# Patient Record
Sex: FEMALE | Race: WHITE | HISPANIC OR LATINO | Employment: UNEMPLOYED | URBAN - METROPOLITAN AREA
[De-identification: names, ages, dates, MRNs, and addresses within clinical notes are randomized per-mention and may not be internally consistent; named-entity substitution may affect disease eponyms.]

---

## 2017-01-24 ENCOUNTER — POST PARTUM (OUTPATIENT)
Dept: OBGYN | Facility: CLINIC | Age: 22
End: 2017-01-24

## 2017-01-24 VITALS — DIASTOLIC BLOOD PRESSURE: 56 MMHG | BODY MASS INDEX: 24.76 KG/M2 | SYSTOLIC BLOOD PRESSURE: 112 MMHG | WEIGHT: 131 LBS

## 2017-01-24 DIAGNOSIS — B37.89 CANDIDIASIS OF BREAST: ICD-10-CM

## 2017-01-24 PROCEDURE — 90050 PR POSTPARTUM VISIT: CPT | Performed by: NURSE PRACTITIONER

## 2017-01-24 RX ORDER — NYSTATIN 100000 U/G
CREAM TOPICAL
Qty: 1 TUBE | Refills: 0 | Status: SHIPPED | OUTPATIENT
Start: 2017-01-24 | End: 2019-03-08

## 2017-01-24 ASSESSMENT — ENCOUNTER SYMPTOMS
RESPIRATORY NEGATIVE: 1
PSYCHIATRIC NEGATIVE: 1
CARDIOVASCULAR NEGATIVE: 1
NEUROLOGICAL NEGATIVE: 1
MUSCULOSKELETAL NEGATIVE: 1
GASTROINTESTINAL NEGATIVE: 1
CONSTITUTIONAL NEGATIVE: 1
EYES NEGATIVE: 1

## 2017-01-24 NOTE — MR AVS SNAPSHOT
Johanny Dent   2017 10:00 AM   Post Partum   MRN: [id number]    [facility]   Dept Phone:  [phone]    Description:  Female : 1995   Provider:  JUDITH Rocha           Allergies as of 2017     No Known Allergies      You were diagnosed with     Encounter for postpartum care of lactating mother   [501297]  -  Primary     Candidiasis of breast   [[id number]]         Vital Signs     Blood Pressure Weight Last Menstrual Period Smoking Status          112/56 mmHg 59.421 kg (131 lb) 2016 Never Smoker         Basic Information     Date Of Birth Sex Race Ethnicity Preferred Language    1995 Female Unable to Obtain  Origin (Peruvian,Bahamian,German,Emirati, etc) English      Problem List              ICD-10-CM Priority Class Noted - Resolved    Encounter for postpartum care of lactating mother Z39.1   2017 - Present      Health Maintenance        Date Due Completion Dates    IMM HEP B VACCINE (1 of 3 - Primary Series) 1996 ---    IMM HEP A VACCINE (1 of 2 - Standard Series) 1997 ---    IMM HPV VACCINE (1 of 3 - Female 3 Dose Series) 2007 ---    IMM VARICELLA (CHICKENPOX) VACCINE (1 of 2 - 2 Dose Adolescent Series) 2009 ---    IMM MENINGOCOCCAL VACCINE (MCV4) (1 of 1) 2012 ---    IMM DTaP/Tdap/Td Vaccine (2 - Td) 2026            Current Immunizations     Influenza Vaccine Quad Inj (Pf) 2016 11:21 AM    Tdap Vaccine 2016  2:30 PM      Below and/or attached are the medications your provider expects you to take. Review all of your home medications and newly ordered medications with your provider and/or pharmacist. Follow medication instructions as directed by your provider and/or pharmacist. Please keep your medication list with you and share with your provider. Update the information when medications are discontinued, doses are changed, or new medications (including over-the-counter products) are  added; and carry medication information at all times in the event of emergency situations     Allergies:  No Known Allergies          Medications  Valid as of: January 24, 2017 -  1:26 PM    Generic Name Brand Name Tablet Size Instructions for use    Ibuprofen (Tab) MOTRIN 800 MG Take 1 Tab by mouth every 8 hours as needed for Moderate Pain (Cramping).        Nystatin (Cream) MYCOSTATIN 317883 UNIT/GM Apply small amount of cream over the affected nipple twice a day        Oxycodone-Acetaminophen (Tab) PERCOCET 5-325 MG Take 1 Tab by mouth every four hours as needed for Moderate Pain or Severe Pain ((Pain Scale 4-6)).        Prenatal MV-Min-Fe Fum-FA-DHA   Take  by mouth.        .                 Medicines prescribed today were sent to:     [facility] [address] [place] [address] [address] [facility] [address]    Phone: [phone] Fax: [phone]    Open 24 Hours?: No      Medication refill instructions:       If your prescription bottle indicates you have medication refills left, it is not necessary to call your provider’s office. Please contact your pharmacy and they will refill your medication.    If your prescription bottle indicates you do not have any refills left, you may request refills at any time through one of the following ways: The online VersionOne system (except Urgent Care), by calling your provider’s office, or by asking your pharmacy to contact your provider’s office with a refill request. Medication refills are processed only during regular business hours and may not be available until the next business day. Your provider may request additional information or to have a follow-up visit with you prior to refilling your medication.   *Please Note: Medication refills are assigned a new Rx number when refilled electronically. Your pharmacy may indicate that no refills were authorized even though a new prescription for the same medication is available at the pharmacy. Please request the  medicine by name with the pharmacy before contacting your provider for a refill.           Taxizu Access Code: [email or web address]  Expires: 1/31/2017  8:16 AM    Taxizu  A secure, online tool to manage your health information     [employer] Taxizu® is a secure, online tool that connects you to your personalized health information from the privacy of your home -- day or night - making it very easy for you to manage your healthcare. Once the activation process is completed, you can even access your medical information using the Taxizu denisha, which is available for free in the Apple Denisha store or Google Play store.     Taxizu provides the following levels of access (as shown below):   My Chart Features   [facility] Doctor [facility]  Specialists [facility]  Urgent  Care [facility]  Primary Care  Doctor   Email your healthcare team securely and privately 24/7 X X X    Manage appointments: schedule your next appointment; view details of past/upcoming appointments X      Request prescription refills. X      View recent personal medical records, including lab and immunizations X X X X   View health record, including health history, allergies, medications X X X X   Read reports about your outpatient visits, procedures, consult and ER notes X X X X   See your discharge summary, which is a recap of your hospital and/or ER visit that includes your diagnosis, lab results, and care plan. X X       How to register for Taxizu:  1. Go to  [email or web address].  2. Click on the Sign Up Now box, which takes you to the New Member Sign Up page. You will need to provide the following information:  a. Enter your Taxizu Access Code exactly as it appears at the top of this page. (You will not need to use this code after you’ve completed the sign-up process. If you do not sign up before the expiration date, you must request a new code.)   b. Enter your date of birth.   c. Enter your home email address.   d. Click Submit, and  follow the next screen’s instructions.  3. Create a Zulahoot ID. This will be your [facility] [employer] login ID and cannot be changed, so think of one that is secure and easy to remember.  4. Create a Zulahoot password. You can change your password at any time.  5. Enter your Password Reset Question and Answer. This can be used at a later time if you forget your password.   6. Enter your e-mail address. This allows you to receive e-mail notifications when new information is available in [facility].  7. Click Sign Up. You can now view your health information.    For assistance activating your [employer] account, call [phone]

## 2017-01-24 NOTE — PROGRESS NOTES
Subjective:      Johanny Hubbard is a 21 y.o. female who presents with No chief complaint on file.    Johanny Hubbard is a 21 y.o.  female who presents for her postpartum exam. She had a P C/S without complication. Her prenatal course was uncompllicated. She denies dysuria, vaginal bleeding, odor, itching or breast problems. She is breastfeeding. She reports having nipple pain and redness with white spots on breast. She states her baby has white spots in his mouth as well. She desires an nothing for her birth control method. Reports no sex prior to this appointment.  Denies any S/S of PP depression.    Assessment   Assessment:    1. PP care of lactating women   2. Exam WNL   3. Pap not done  4. Desires contraception     Patient Active Problem List    Diagnosis Date Noted   • Encounter for postpartum care of lactating mother 2017       Plan   Plan:    1. Breastfeeding support   2. Continue PNV   3. Contraceptive counseling - follow up w health dept or [facility] for BCM  4. Encouraged condom use   5. Discussed diet, exercise and resumption of sexual activity   6. Rx for nystatin  7.  F/u c PCP or [facility] clinic as needed for primary care needs.             HPI    Review of Systems   Constitutional: Negative.    HENT: Negative.    Eyes: Negative.    Respiratory: Negative.    Cardiovascular: Negative.    Gastrointestinal: Negative.    Genitourinary: Negative.    Musculoskeletal: Negative.    Skin: Negative.    Neurological: Negative.    Endo/Heme/Allergies: Negative.    Psychiatric/Behavioral: Negative.           Objective:     /56 mmHg  Wt 59.421 kg (131 lb)  LMP 2016     Physical Exam   Constitutional: She is oriented to person, place, and time. She appears well-developed and well-nourished.   HENT:   Head: Normocephalic.   Eyes: Pupils are equal, round, and reactive to light.   Neck: Normal range of motion.   Cardiovascular: Normal rate, regular rhythm and normal heart  sounds.    Pulmonary/Chest: Effort normal and breath sounds normal.   Abdominal: Soft.   Genitourinary: Vagina normal and uterus normal. Rectal exam shows no tenderness. Pelvic exam was performed with patient supine. No labial fusion. There is no rash, tenderness, lesion or injury on the right labia. There is no rash, tenderness, lesion or injury on the left labia. No erythema, tenderness or bleeding in the vagina. No signs of injury around the vagina. No vaginal discharge found.   Musculoskeletal: Normal range of motion.   Neurological: She is alert and oriented to person, place, and time.   Skin: Skin is warm and dry.   Psychiatric: She has a normal mood and affect. Her behavior is normal. Judgment and thought content normal.               Assessment/Plan:     1. Encounter for postpartum care of lactating mother      2. Candidiasis of breast    - nystatin (MYCOSTATIN) 415359 UNIT/GM Cream topical cream; Apply small amount of cream over the affected nipple twice a day  Dispense: 1 Tube; Refill: 0

## 2017-01-24 NOTE — NON-PROVIDER
Pt here today for postpartum exam.  Delivery type: C/S on 12-19-16  Currently : Breast and bottle feeding  Desired BCM: None  LMP: N/A  Phone # [phone]  Pt states she had signs and symptoms of PP depression; resolved

## 2018-05-30 ENCOUNTER — HOSPITAL ENCOUNTER (EMERGENCY)
Facility: MEDICAL CENTER | Age: 23
End: 2018-05-30
Attending: EMERGENCY MEDICINE

## 2018-05-30 ENCOUNTER — APPOINTMENT (OUTPATIENT)
Dept: RADIOLOGY | Facility: MEDICAL CENTER | Age: 23
End: 2018-05-30
Attending: EMERGENCY MEDICINE

## 2018-05-30 VITALS
BODY MASS INDEX: 28.59 KG/M2 | TEMPERATURE: 97.9 F | DIASTOLIC BLOOD PRESSURE: 85 MMHG | HEIGHT: 57 IN | OXYGEN SATURATION: 99 % | SYSTOLIC BLOOD PRESSURE: 121 MMHG | RESPIRATION RATE: 16 BRPM | HEART RATE: 65 BPM | WEIGHT: 132.5 LBS

## 2018-05-30 DIAGNOSIS — S60.012A CONTUSION OF LEFT THUMB WITHOUT DAMAGE TO NAIL, INITIAL ENCOUNTER: Primary | ICD-10-CM

## 2018-05-30 PROCEDURE — 99284 EMERGENCY DEPT VISIT MOD MDM: CPT

## 2018-05-30 PROCEDURE — 90471 IMMUNIZATION ADMIN: CPT

## 2018-05-30 PROCEDURE — 700111 HCHG RX REV CODE 636 W/ 250 OVERRIDE (IP): Performed by: EMERGENCY MEDICINE

## 2018-05-30 PROCEDURE — 700102 HCHG RX REV CODE 250 W/ 637 OVERRIDE(OP): Performed by: EMERGENCY MEDICINE

## 2018-05-30 PROCEDURE — 302874 HCHG BANDAGE ACE 2 OR 3""

## 2018-05-30 PROCEDURE — 73130 X-RAY EXAM OF HAND: CPT | Mod: LT

## 2018-05-30 PROCEDURE — A9270 NON-COVERED ITEM OR SERVICE: HCPCS | Performed by: EMERGENCY MEDICINE

## 2018-05-30 PROCEDURE — 29125 APPL SHORT ARM SPLINT STATIC: CPT

## 2018-05-30 PROCEDURE — 90715 TDAP VACCINE 7 YRS/> IM: CPT | Performed by: EMERGENCY MEDICINE

## 2018-05-30 RX ORDER — IBUPROFEN 600 MG/1
600 TABLET ORAL EVERY 6 HOURS PRN
Status: DISCONTINUED | OUTPATIENT
Start: 2018-05-30 | End: 2018-05-30 | Stop reason: HOSPADM

## 2018-05-30 RX ORDER — IBUPROFEN 600 MG/1
600 TABLET ORAL EVERY 6 HOURS PRN
Qty: 20 TAB | Refills: 0 | Status: SHIPPED | OUTPATIENT
Start: 2018-05-30 | End: 2019-03-08

## 2018-05-30 RX ORDER — HYDROCODONE BITARTRATE AND ACETAMINOPHEN 5; 325 MG/1; MG/1
1-2 TABLET ORAL EVERY 6 HOURS PRN
Status: DISCONTINUED | OUTPATIENT
Start: 2018-05-30 | End: 2018-05-30 | Stop reason: HOSPADM

## 2018-05-30 RX ORDER — HYDROCODONE BITARTRATE AND ACETAMINOPHEN 5; 325 MG/1; MG/1
1-2 TABLET ORAL EVERY 4 HOURS PRN
Qty: 10 TAB | Refills: 0 | Status: SHIPPED | OUTPATIENT
Start: 2018-05-30 | End: 2018-06-02

## 2018-05-30 RX ADMIN — HYDROCODONE BITARTRATE AND ACETAMINOPHEN 1 TABLET: 5; 325 TABLET ORAL at 05:08

## 2018-05-30 RX ADMIN — IBUPROFEN 600 MG: 600 TABLET, FILM COATED ORAL at 05:08

## 2018-05-30 RX ADMIN — CLOSTRIDIUM TETANI TOXOID ANTIGEN (FORMALDEHYDE INACTIVATED), CORYNEBACTERIUM DIPHTHERIAE TOXOID ANTIGEN (FORMALDEHYDE INACTIVATED), BORDETELLA PERTUSSIS TOXOID ANTIGEN (GLUTARALDEHYDE INACTIVATED), BORDETELLA PERTUSSIS FILAMENTOUS HEMAGGLUTININ ANTIGEN (FORMALDEHYDE INACTIVATED), BORDETELLA PERTUSSIS PERTACTIN ANTIGEN, AND BORDETELLA PERTUSSIS FIMBRIAE 2/3 ANTIGEN 0.5 ML: 5; 2; 2.5; 5; 3; 5 INJECTION, SUSPENSION INTRAMUSCULAR at 05:09

## 2018-05-30 ASSESSMENT — PAIN SCALES - GENERAL
PAINLEVEL_OUTOF10: 0
PAINLEVEL_OUTOF10: 8

## 2018-05-30 NOTE — DISCHARGE INSTRUCTIONS
Follow-up with orthopedics next week for reevaluation.    Keep splint clean, dry and intact.  Nonweightbearing left hand.    Motrin every 6 hours as needed for discomfort.  Norco every 4-6 hours as needed for breakthrough pain.    Rest, ice, elevate as needed for swelling or discomfort.    Return the emergency department for increased pain, swelling, discoloration, paresthesias or other new concerns.    Crush Injury of the Hand  Introduction  When a crush injury of the hand occurs, many structures within the hand and wrist joint can be affected. This can result in a complicated injury that may involve:  · One or more broken (fractured) bones.  · Lacerations or abrasions of the skin. These increase your risk of infection.  · Compressed or torn muscles.  · Torn ligaments and tendons.  · Broken blood vessels, causing bleeding within the tissues. This can lead to dangerously high pressure within the tissues (compartment syndrome).  · Damage to nerves.  · One or more finger amputations.  What are the causes?  This type of injury can happen when a great amount of force is suddenly applied to the hand. This might occur:  · During a motor vehicle accident.  · If the hand is pulled into a machine during industrial or agricultural work.  · If a heavy load falls directly onto the hand.  What are the signs or symptoms?  Symptoms will vary depending on which structures in your hand have been injured. Symptoms may include:  · Moderate or severe pain in the hand, wrist, or arm.  · Bleeding at the site of injury.  · Tingling, numbness, or loss of feeling (sensation) in part or all of your hand.  · Loss of movement in part or all of your hand.  How is this diagnosed?  Your health care provider will examine you and ask questions about how your injury happened. The exam may include checking for sensation and blood flow into your hand. You may also have tests, including X-rays and procedures to check the pressure in your hand.  After  initial treatment, additional studies may be done to further diagnose the extent of your injuries. These may include:  · A nerve conduction study to determine how well the nerves are working in your arm and hand.  · MRI to determine if other injuries occurred that do not usually show up on X-ray.  How is this treated?  Treatment for this condition depends on the severity of your crush injury. Treatment may include:  · Thorough cleaning if you have an open wound. This may or may not require surgery.  · Having a splint applied to your fingers, hand, or forearm.  · Medicine to relieve pain.  · Antibiotic medicine to prevent infection.  · Stitches (sutures) to close open wounds.  · One or more surgeries to address injuries to skin, bones, joints, tendons, ligaments, muscles, nerves, or blood vessels.  Follow these instructions at home:  If you have a splint:  · Wear the splint as told by your health care provider. Remove it only as told by your health care provider.  · Loosen the splint if your fingers tingle, become numb, or turn cold and blue.  · Do not let your splint get wet if it is not waterproof.  · Keep the splint clean.  Wound care  · If you have any skin wounds that were covered with bandages (dressings), follow instructions from your health care provider about how to take care of your wound. Make sure you:  ¨ Wash your hands with soap and water before you change your dressing. If soap and water are not available, use hand .  ¨ Change your dressing as told by your health care provider.  ¨ Leave stitches (sutures), skin glue, or adhesive strips in place. These skin closures may need to stay in place for 2 weeks or longer. If adhesive strip edges start to loosen and curl up, you may trim the loose edges. Do not remove adhesive strips completely unless your health care provider tells you to do that.  · If you have skin wounds, check them every day for signs of infection. Check for:  ¨ More redness,  swelling, or pain.  ¨ More fluid or blood.  ¨ Warmth.  ¨ Pus or a bad smell.  Managing pain, stiffness, and swelling  · If directed, put ice on the injured area.  ¨ Put ice in a plastic bag.  ¨ Place a towel between your skin and the bag.  ¨ Leave the ice on for 20 minutes, 2-3 times a day.  · Raise (elevate) the injured area above the level of your heart while you are sitting or lying down.  Driving  · Do not drive or operate heavy machinery while taking prescription pain medicine.  · Ask your health care provider when it is safe to drive if you have a splint on your hand or arm.  Activity  · Return to your normal activities as told by your health care provider. Ask your health care provider what activities are safe for you.  · Work with a physical therapist (PT) or occupational therapist (OT) as told by your health care provider.  General instructions  · Do not put pressure on any part of the splint until it is fully hardened. This may take several hours.  · If you have a splint and it is not waterproof, cover it with a watertight plastic bag when you take a bath or a shower.  · Take over-the-counter and prescription medicines only as told by your health care provider.  · If you were prescribed an antibiotic, take it as told by your health care provider. Do not stop taking the antibiotic before the prescription is done.  · Do not use any tobacco products, such as cigarettes, chewing tobacco, and e-cigarettes. Tobacco can delay bone healing. If you need help quitting, ask your health care provider.  · Keep all follow-up visits as told by your health care provider. This is important. These include PT and OT visits.  Contact a health care provider if:  · A wound that was sutured opens up.  · You have more redness, swelling, or pain in your hand.  · You have more fluid or blood coming from your hand.  · Your hand feels warm to the touch.  · You have pus or a bad smell coming from your hand.  · You have a fever.  Get  help right away if:  · You suddenly develop severe pain in your hand.  · You previously had sensation in your hand and you suddenly lose sensation.  · Your wrist or hand becomes bent (contracted) involuntarily.  · Your symptoms had improved and they suddenly get worse.  · Your hand or fingers are turning pink or blue.  This information is not intended to replace advice given to you by your health care provider. Make sure you discuss any questions you have with your health care provider.  Document Released: 11/28/2016 Document Revised: 05/25/2017 Document Reviewed: 08/10/2016  © 2017 Elsevier

## 2018-05-30 NOTE — ED NOTES
D/c instructions and rxs reviewed with pt.pt instructed do not drink etoh, drive or work while taking norco, pt verbalized understanding. Pt in nad at time of d/c. Splint in place at time of d/c.

## 2018-05-30 NOTE — ED TRIAGE NOTES
"Triage notes    Pt c/o LEFT thumb pain from hitting herself with a hammer at 1900, small cut to top of thumb, bleeding controlled with mild swelling noted.    xrays ordered    .  Chief Complaint   Patient presents with   • Digit Pain     hit LEFT thumb with a hammer at 1900, small cut to finger, bleeding controlled      ./71   Pulse (!) 59   Temp 36.1 °C (97 °F)   Resp 18   Ht 1.448 m (4' 9\")   Wt 60.1 kg (132 lb 7.9 oz)   LMP 03/06/2016 Comment: Regular periods, +UPT April, no bleeding since  SpO2 98%   BMI 28.67 kg/m²     "

## 2018-05-30 NOTE — ED PROVIDER NOTES
"ED Provider Note    CHIEF COMPLAINT  Chief Complaint   Patient presents with   • Digit Pain     hit LEFT thumb with a hammer at 1900, small cut to finger, bleeding controlled        HPI  Johanny Hubbard is a 22 y.o. female who presents to the emergency department with left thumb pain.  Patient states she accidentally hammered her own some last night, left thumb over the interphalangeal joint.  Mild swelling, stiffness and throbbing since that time.  Throbbing, 6 out of 10, keeping her awake overnight.  Abrasion overlying this area as well.  Last tetanus greater than 5 years ago.  No medications taken for discomfort at home.  No paresthesias or discoloration.    REVIEW OF SYSTEMS  See HPI for further details.     PAST MEDICAL HISTORY   Denies    SOCIAL HISTORY  Social History     Social History Main Topics   • Smoking status: Never Smoker   • Smokeless tobacco: Never Used   • Alcohol use No   • Drug use: No   • Sexual activity: Yes     Partners: Male      Comment: none        SURGICAL HISTORY   has a past surgical history that includes primary c section (12/19/2016).    CURRENT MEDICATIONS  Home Medications    **Home medications have not yet been reviewed for this encounter**     Denies    ALLERGIES  No Known Allergies    PHYSICAL EXAM  VITAL SIGNS: /71   Pulse (!) 59   Temp 36.1 °C (97 °F)   Resp 18   Ht 1.448 m (4' 9\")   Wt 60.1 kg (132 lb 7.9 oz)   LMP 03/06/2016 Comment: Regular periods, +UPT April, no bleeding since  SpO2 98%   BMI 28.67 kg/m²   Pulse ox interpretation: I interpret this pulse ox as normal.  Constitutional: Alert in no apparent distress.  HENT: Normocephalic, atraumatic. Bilateral external ears normal, Nose normal. Moist mucous membranes.    Eyes:  Conjunctiva normal.   Neck: Normal range of motion, Supple  Cardiovascular: Normal peripheral perfusion.  Thorax & Lungs: Nonlabored respirations.  Skin: Warm, Dry  Musculoskeletal: Left thumb with mild swelling circumferentially " compared to right.  Pain with palpation and any active or passive range of motion at the interphalangeal joint.  Superficial punctate abrasion laterally without hematoma, active bleeding or laceration.  No evidence for full-thickness injury.  Less than 2 second capillary refill distally.  Sensation intact light touch distally.  Neurologic: Alert , no gross focal deficit noted.  Psychiatric: Affect normal, Judgment normal, Mood normal.       DIAGNOSTIC STUDIES / PROCEDURES  RADIOLOGY  DX-HAND 3+ LEFT   Final Result         1.  No acute traumatic bony injury.          COURSE & MEDICAL DECISION MAKING  Evaluation most consistent with left thumb contusion/crush injury, however my personal review of the images, one view is concerning for a nondisplaced fracture through the proximal distal phalanges of the left thumb.  Otherwise no acute traumatic bony injury noted by radiologist.  Superficial abrasion without full-thickness injury or evidence for open fracture.  CMS is intact distally.  Wound has been cleansed and dressed.  Tetanus has been updated.  Motrin and Norco for discomfort.  Patient has been placed in a thumb spica and will follow up with orthopedics next week.    Patient is stable for discharge at this time, anticipatory guidance provided, Motrin, Norco for breakthrough pain, splint for comfort and mobility until seen by orthopedics to exclude occult fracture, close follow-up is encouraged, and strict ED return instructions have been detailed. Patient and her friend are agreeable to the disposition and plan.    FINAL IMPRESSION  (S60.012A) Contusion of left thumb without damage to nail, initial encounter  (primary encounter diagnosis)      Electronically signed by: Vivian Bellamy, 5/30/2018 5:03 AM      This dictation was created using voice recognition software. The accuracy of the dictation is limited to the abilities of the software. I expect there may be some errors of grammar and possibly content. The  nursing notes were reviewed and certain aspects of this information were incorporated into this note.

## 2019-03-08 ENCOUNTER — APPOINTMENT (OUTPATIENT)
Dept: RADIOLOGY | Facility: MEDICAL CENTER | Age: 24
End: 2019-03-08
Attending: EMERGENCY MEDICINE
Payer: OTHER MISCELLANEOUS

## 2019-03-08 ENCOUNTER — HOSPITAL ENCOUNTER (EMERGENCY)
Facility: MEDICAL CENTER | Age: 24
End: 2019-03-08
Attending: EMERGENCY MEDICINE
Payer: OTHER MISCELLANEOUS

## 2019-03-08 VITALS
DIASTOLIC BLOOD PRESSURE: 62 MMHG | HEART RATE: 72 BPM | OXYGEN SATURATION: 99 % | WEIGHT: 140 LBS | TEMPERATURE: 98.2 F | BODY MASS INDEX: 30.3 KG/M2 | RESPIRATION RATE: 16 BRPM | SYSTOLIC BLOOD PRESSURE: 119 MMHG

## 2019-03-08 DIAGNOSIS — M25.552 LEFT HIP PAIN: ICD-10-CM
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
## 2019-03-08 LAB
APPEARANCE UR: CLEAR
BILIRUB UR QL STRIP.AUTO: NEGATIVE
COLOR UR: YELLOW
GLUCOSE UR STRIP.AUTO-MCNC: NEGATIVE MG/DL
HCG UR QL: NEGATIVE
KETONES UR STRIP.AUTO-MCNC: NEGATIVE MG/DL
LEUKOCYTE ESTERASE UR QL STRIP.AUTO: NEGATIVE
MICRO URNS: NORMAL
NITRITE UR QL STRIP.AUTO: NEGATIVE
PH UR STRIP.AUTO: 5.5 [PH]
PROT UR QL STRIP: NEGATIVE MG/DL
RBC UR QL AUTO: NEGATIVE
SP GR UR REFRACTOMETRY: 1.02
SP GR UR STRIP.AUTO: 1.02
UROBILINOGEN UR STRIP.AUTO-MCNC: 0.2 MG/DL

## 2019-03-08 PROCEDURE — 700102 HCHG RX REV CODE 250 W/ 637 OVERRIDE(OP): Performed by: EMERGENCY MEDICINE

## 2019-03-08 PROCEDURE — 99284 EMERGENCY DEPT VISIT MOD MDM: CPT

## 2019-03-08 PROCEDURE — A9270 NON-COVERED ITEM OR SERVICE: HCPCS | Performed by: EMERGENCY MEDICINE

## 2019-03-08 PROCEDURE — 73502 X-RAY EXAM HIP UNI 2-3 VIEWS: CPT | Mod: LT

## 2019-03-08 PROCEDURE — 81025 URINE PREGNANCY TEST: CPT

## 2019-03-08 PROCEDURE — 81003 URINALYSIS AUTO W/O SCOPE: CPT

## 2019-03-08 RX ORDER — ACETAMINOPHEN 325 MG/1
975 TABLET ORAL ONCE
Status: COMPLETED | OUTPATIENT
Start: 2019-03-08 | End: 2019-03-08

## 2019-03-08 RX ORDER — IBUPROFEN 600 MG/1
600 TABLET ORAL ONCE
Status: COMPLETED | OUTPATIENT
Start: 2019-03-08 | End: 2019-03-08

## 2019-03-08 RX ADMIN — ACETAMINOPHEN 975 MG: 325 TABLET, FILM COATED ORAL at 21:45

## 2019-03-08 RX ADMIN — IBUPROFEN 600 MG: 600 TABLET ORAL at 21:45

## 2019-03-08 ASSESSMENT — LIFESTYLE VARIABLES: DO YOU DRINK ALCOHOL: NO

## 2019-03-09 NOTE — DISCHARGE INSTRUCTIONS
You were seen in the emergency department after a fall with left flank and hip pain.  Your physical exam and x-rays were reassuring.  There is no evidence of fracture.  You most likely have bruising.  There is no evidence of blood in the urine.    For pain you can take ibuprofen (Motrin), 600mg every 6 hours as needed for pain (take with food to avoid GI upset). You can also take acetaminophen (Tylenol), 1000mg every 8 hours as needed for pain. Do not take more than 3000mg of acetaminophen in any 24 hour period.  Taking these medications regularly during the day can be very effective in controlling pain.    You will likely be increasingly sore tomorrow, then should begin to improve.     You are found to have a mildly unusual lesion on your bone.  This may be normal, however you should follow-up with your regular doctor.    Please return to the emergency department or seek medical attention if you develop:  Loss of sensation in any part of your body, inability to move your leg or toes, severe abdominal pain, lightheadedness, any other new or concerning findings

## 2019-03-09 NOTE — ED PROVIDER NOTES
ED Provider Note        History obtained from: Patient    CHIEF COMPLAINT  Chief Complaint   Patient presents with   • T-5000 FALL     slipped and fell, landing on left arm and hip. denies LOC or hitting head   • Hip Pain     left        HPI  Johanny Hubbard is a 23 y.o. female who presents with left hip and flank pain.  The patient was at a mall when she slipped on someone else's ice cream, landing on her left side.  She denies any head strike or loss of conscious.  She has been able to ambulate, however with pain.  She reports pain in her left lower back, left hip, left thigh.  Denies any paresthesias.  Denies any other prior history or previous injuries to the site    REVIEW OF SYSTEMS    CONSTITUTIONAL:  No fever.  CARDIOVASCULAR:  No chest discomfort.  RESPIRATORY:  No pleuritic chest pain.  GASTROINTESTINAL:  No abdominal pain.    See HPI for further details.       PAST MEDICAL HISTORY  History reviewed. No pertinent past medical history.    FAMILY HISTORY  History reviewed. No pertinent family history.    SOCIAL HISTORY   reports that she has never smoked. She has never used smokeless tobacco. She reports that she does not drink alcohol or use drugs.    SURGICAL HISTORY  Past Surgical History:   Procedure Laterality Date   • PRIMARY C SECTION  12/19/2016    Procedure: PRIMARY C SECTION;  Surgeon: Lucian Lucero M.D.;  Location: LABOR AND DELIVERY;  Service:        CURRENT MEDICATIONS  Home Medications     Reviewed by Jeremiah Han R.N. (Registered Nurse) on 03/08/19 at 4732  Med List Status: Complete   Medication Last Dose Status   NON SPECIFIED  Active                ALLERGIES  No Known Allergies    PHYSICAL EXAM  VITAL SIGNS: /80   Pulse 83   Temp 36.8 °C (98.2 °F) (Temporal)   Resp 17   Wt 63.5 kg (140 lb)   SpO2 97%   BMI 30.30 kg/m²    Gen: alert, no acute distress  HENT: ATNC  Neck: No tenderness  Eyes: normal conjuctiva  Resp: No resipiratory distress.   CV: No JVD   Abd:  Non-distended, no tenderness  Extremities: No deformity.  Left paraspinous muscle tenderness, left hip tenderness, left thigh tenderness, no deformities.          RADIOLOGY/PROCEDURES  DX-HIP-COMPLETE - UNILATERAL 2+ LEFT   Final Result         1.  No radiographic evidence of acute traumatic injury.   2.  Sclerotic focus in the left femoral metaphysis, appearance suggests bony island, consider other sclerotic bony lesion with additional workup as clinically appropriate            LABS  Labs Reviewed   URINALYSIS,CULTURE IF INDICATED   HCG QUALITATIVE UR   REFRACTOMETER SG        COURSE & MEDICAL DECISION MAKING  Pertinent Labs & Imaging studies reviewed. (See chart for details)    Patient presents with mechanical slip and fall.  Low suspicion for syncope, head injury, cervical spine injury.  Based on the location of the pain, will obtain urinalysis to screen for hematuria.  Will obtain x-ray of the hip.  Low suspicion for unstable spinal injury.  Distal CSMs are intact.    11:04 PM  Urinalysis negative, no evidence of kidney injury, x-ray is reassuring.  Patient does have a sclerotic lesion.  She was notified of this and advised to follow-up with her regular doctor.  Patient has been able to ambulate, however prefers crutches to reduce discomfort.      FINAL IMPRESSION  1. Fall, initial encounter    2. Left hip pain

## 2019-03-09 NOTE — ED NOTES
Pt placed in gown.   Pt medicated (see MAR).   Pt wheeled to bathroom, assisted by RN, urine collected using clean catch and tubed to lab.   Pt back to room, provided with blankets.

## 2019-03-09 NOTE — ED TRIAGE NOTES
Johanny Hubbard  Chief Complaint   Patient presents with   • T-5000 FALL     slipped and fell, landing on left arm and hip. denies LOC or hitting head   • Hip Pain     left      Pt to triage in w/c with above complaint. VSS.  States increased pain to (L) hip with movement and ambulation.  CMS intact.   Pt returned to lobby, educated on triage process, and to inform staff of any changes or concerns.

## 2019-03-09 NOTE — ED NOTES
Pt wheeled to room with assistance from RN. Pain 8/10 in left hip and upper leg. Agree with triage note. Assessment complete. Chart ready for ERP.

## 2019-06-21 ENCOUNTER — HOSPITAL ENCOUNTER (EMERGENCY)
Facility: MEDICAL CENTER | Age: 24
End: 2019-06-21
Attending: EMERGENCY MEDICINE
Payer: COMMERCIAL

## 2019-06-21 ENCOUNTER — NON-PROVIDER VISIT (OUTPATIENT)
Dept: OCCUPATIONAL MEDICINE | Facility: CLINIC | Age: 24
End: 2019-06-21
Payer: COMMERCIAL

## 2019-06-21 VITALS
RESPIRATION RATE: 18 BRPM | OXYGEN SATURATION: 100 % | SYSTOLIC BLOOD PRESSURE: 118 MMHG | WEIGHT: 134.48 LBS | DIASTOLIC BLOOD PRESSURE: 77 MMHG | BODY MASS INDEX: 29.01 KG/M2 | HEART RATE: 65 BPM | HEIGHT: 57 IN | TEMPERATURE: 97.5 F

## 2019-06-21 DIAGNOSIS — Z02.1 PRE-EMPLOYMENT DRUG SCREENING: ICD-10-CM

## 2019-06-21 DIAGNOSIS — Z02.83 ENCOUNTER FOR DRUG SCREENING: ICD-10-CM
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
## 2019-06-21 PROCEDURE — A9270 NON-COVERED ITEM OR SERVICE: HCPCS | Performed by: EMERGENCY MEDICINE

## 2019-06-21 PROCEDURE — 80305 DRUG TEST PRSMV DIR OPT OBS: CPT | Performed by: PREVENTIVE MEDICINE

## 2019-06-21 PROCEDURE — 8895 PB URINE 6 PANEL AFTER HOURS: Performed by: PREVENTIVE MEDICINE

## 2019-06-21 PROCEDURE — 99283 EMERGENCY DEPT VISIT LOW MDM: CPT

## 2019-06-21 PROCEDURE — 700102 HCHG RX REV CODE 250 W/ 637 OVERRIDE(OP): Performed by: EMERGENCY MEDICINE

## 2019-06-21 RX ORDER — CEPHALEXIN 500 MG/1
500 CAPSULE ORAL 4 TIMES DAILY
Qty: 28 QUANTITY SUFFICIENT | Refills: 0 | Status: SHIPPED | OUTPATIENT
Start: 2019-06-21 | End: 2019-06-28

## 2019-06-21 RX ORDER — ACETAMINOPHEN 325 MG/1
650 TABLET ORAL ONCE
Status: COMPLETED | OUTPATIENT
Start: 2019-06-21 | End: 2019-06-21

## 2019-06-21 RX ORDER — DIPHENHYDRAMINE HCL 25 MG
25 TABLET ORAL ONCE
Status: COMPLETED | OUTPATIENT
Start: 2019-06-21 | End: 2019-06-21

## 2019-06-21 RX ADMIN — ACETAMINOPHEN 650 MG: 325 TABLET, FILM COATED ORAL at 15:26

## 2019-06-21 RX ADMIN — DIPHENHYDRAMINE HCL 25 MG: 25 TABLET ORAL at 15:26

## 2019-06-21 NOTE — ED NOTES
"Complains of a possible insect bite on the lateral palmar aspect of her left hand since earlier today.   Chief Complaint   Patient presents with   • Bug Bite     /72   Pulse 62   Temp 36.4 °C (97.5 °F) (Temporal)   Resp 20   Ht 1.448 m (4' 9\")   Wt 61 kg (134 lb 7.7 oz)   LMP 06/07/2019 (Approximate)   SpO2 100%   BMI 29.10 kg/m²     "

## 2019-06-21 NOTE — ED PROVIDER NOTES
"ED Provider Note  CHIEF COMPLAINT  Chief Complaint   Patient presents with   • Bug Bite       HPI  Johanny Hubbard is a 23 y.o. female who presents with an insect bite to the left fifth digit.  It occurred about 6:00 this morning while the patient was at work.  Patient was certain that it was a brown and white spider that she saw bite her.  She has itching and swelling in that area.  She denies any lip swelling.  She denies any rash to the rest of her body.  She denies any difficulty breathing.  She is not having any problems moving her hand.  She states it stings a little and she thought she saw little bit of white stuff come out of the center of the bug bite.  She currently has a mild headache and feels a little bit dizzy.  She denies any history of diabetes.    REVIEW OF SYSTEMS  Positive for insect bite, dizziness and headache negative for lip swelling, difficulty breathing, pain with movement of her hand, numbness or tingling.  Denies fevers per    PAST MEDICAL HISTORY   No diabetes    SOCIAL HISTORY  Social History     Social History Main Topics   • Smoking status: Never Smoker   • Smokeless tobacco: Never Used   • Alcohol use No   • Drug use: No   • Sexual activity: Yes     Partners: Male      Comment: none        SURGICAL HISTORY   has a past surgical history that includes primary c section (12/19/2016).    CURRENT MEDICATIONS  Reviewed.  See Encounter Summary.      ALLERGIES  No Known Allergies    PHYSICAL EXAM  VITAL SIGNS: /72   Pulse 62   Temp 36.4 °C (97.5 °F) (Temporal)   Resp 20   Ht 1.448 m (4' 9\")   Wt 61 kg (134 lb 7.7 oz)   LMP 06/07/2019 (Approximate)   SpO2 100%   BMI 29.10 kg/m²   Constitutional: Alert in no apparent distress.  HENT: Normocephalic, Bilateral external ears normal. Nose normal.  No lip swelling  Eyes: Pupils are equal and reactive. Conjunctiva normal, non-icteric.   Thorax & Lungs: Easy unlabored respirations  Abdomen:  No gross signs of peritonitis, no pain " with movement   Skin: Visualized skin is  Dry, No erythema, No rash.  On the lateral aspect of the fifth digit about the level of the MCP there is a insect bite.  There is no surrounding erythema.  There is no fluctuance or discharge from the insect bite.  Extremities:   No edema, No asymmetry, normal range of motion, no swelling extending onto the hand  Neurologic: Alert, Grossly non-focal.   Psychiatric: Affect and Mood normal      COURSE & MEDICAL DECISION MAKING  Nursing notes and vital signs were reviewed. (See chart for details)    The patient presents to the Emergency Department with an insect bite.  I see no evidence of an expanding cellulitis.  There is no fluctuance to suggest abscess.  Patient has normal range of motion of her hand.  She describes the spider as white-brown.  Does not sound like a black .  She is complaining of a mild headache and some dizziness.  I do not see evidence of further skin rash on the rest of her body or swelling.  I will however give her a Benadryl.  Will give Tylenol for the headache.  She was placed on Keflex as a precaution.  Wound care instructions were given.      Discharge Medications: keflex     The patient verbally agreed to the discharge precautions and follow-up plan which is documented in EPIC.    FINAL IMPRESSION  1. Insect bite, initial encounter

## 2019-06-21 NOTE — DISCHARGE INSTRUCTIONS
You can try some hydrocortisone cream to the bite as well.  Take Benadryl for the itchiness.  Take the antibiotic for possible early infection.  Any hand swelling, fevers, new or different symptoms or concerns return.

## 2019-06-28 LAB
AMP AMPHETAMINE: NORMAL
COC COCAINE: NORMAL
INT CON NEG: NORMAL
INT CON POS: NORMAL
MET METHAMPHETAMINES: NORMAL
OPI OPIATES: NORMAL
PCP PHENCYCLIDINE: NORMAL
POC DRUG COMMENT 753798-OCCUPATIONAL HEALTH: NEGATIVE
THC: NORMAL

## 2020-01-20 ENCOUNTER — HOSPITAL ENCOUNTER (EMERGENCY)
Dept: HOSPITAL 8 - ED | Age: 25
Discharge: HOME | End: 2020-01-20
Payer: SELF-PAY

## 2020-01-20 VITALS — SYSTOLIC BLOOD PRESSURE: 116 MMHG | DIASTOLIC BLOOD PRESSURE: 59 MMHG

## 2020-01-20 VITALS — HEIGHT: 57 IN | BODY MASS INDEX: 28.44 KG/M2 | WEIGHT: 131.84 LBS

## 2020-01-20 DIAGNOSIS — O20.0: Primary | ICD-10-CM

## 2020-01-20 LAB
ALBUMIN SERPL-MCNC: 3.6 G/DL (ref 3.4–5)
ALP SERPL-CCNC: 67 U/L (ref 45–117)
ALT SERPL-CCNC: 11 U/L (ref 12–78)
ANION GAP SERPL CALC-SCNC: 9 MMOL/L (ref 5–15)
BASOPHILS # BLD AUTO: 0.03 X10^3/UL (ref 0–0.1)
BASOPHILS NFR BLD AUTO: 1 % (ref 0–1)
BILIRUB SERPL-MCNC: 0.4 MG/DL (ref 0.2–1)
CALCIUM SERPL-MCNC: 8.2 MG/DL (ref 8.5–10.1)
CHLORIDE SERPL-SCNC: 108 MMOL/L (ref 98–107)
CREAT SERPL-MCNC: 0.69 MG/DL (ref 0.55–1.02)
CULTURE INDICATED?: NO
EOSINOPHIL # BLD AUTO: 0.07 X10^3/UL (ref 0–0.4)
EOSINOPHIL NFR BLD AUTO: 1 % (ref 1–7)
ERYTHROCYTE [DISTWIDTH] IN BLOOD BY AUTOMATED COUNT: 13.3 % (ref 9.6–15.2)
LYMPHOCYTES # BLD AUTO: 2.19 X10^3/UL (ref 1–3.4)
LYMPHOCYTES NFR BLD AUTO: 31 % (ref 22–44)
MCH RBC QN AUTO: 29.3 PG (ref 27–34.8)
MCHC RBC AUTO-ENTMCNC: 33.5 G/DL (ref 32.4–35.8)
MCV RBC AUTO: 87.2 FL (ref 80–100)
MD: NO
MICROSCOPIC: (no result)
MONOCYTES # BLD AUTO: 0.4 X10^3/UL (ref 0.2–0.8)
MONOCYTES NFR BLD AUTO: 6 % (ref 2–9)
NEUTROPHILS # BLD AUTO: 4.41 X10^3/UL (ref 1.8–6.8)
NEUTROPHILS NFR BLD AUTO: 62 % (ref 42–75)
PLATELET # BLD AUTO: 333 X10^3/UL (ref 130–400)
PMV BLD AUTO: 9.1 FL (ref 7.4–10.4)
PROT SERPL-MCNC: 7.5 G/DL (ref 6.4–8.2)
RBC # BLD AUTO: 4.65 X10^6/UL (ref 3.82–5.3)

## 2020-01-20 PROCEDURE — 81003 URINALYSIS AUTO W/O SCOPE: CPT

## 2020-01-20 PROCEDURE — 80053 COMPREHEN METABOLIC PANEL: CPT

## 2020-01-20 PROCEDURE — 76801 OB US < 14 WKS SINGLE FETUS: CPT

## 2020-01-20 PROCEDURE — 36415 COLL VENOUS BLD VENIPUNCTURE: CPT

## 2020-01-20 PROCEDURE — 84702 CHORIONIC GONADOTROPIN TEST: CPT

## 2020-01-20 PROCEDURE — 86901 BLOOD TYPING SEROLOGIC RH(D): CPT

## 2020-01-20 PROCEDURE — 99284 EMERGENCY DEPT VISIT MOD MDM: CPT

## 2020-01-20 PROCEDURE — 85025 COMPLETE CBC W/AUTO DIFF WBC: CPT
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
## 2020-01-22 ENCOUNTER — HOSPITAL ENCOUNTER (EMERGENCY)
Dept: HOSPITAL 8 - ED | Age: 25
Discharge: HOME | End: 2020-01-22
Payer: SELF-PAY

## 2020-01-22 VITALS — DIASTOLIC BLOOD PRESSURE: 72 MMHG | SYSTOLIC BLOOD PRESSURE: 122 MMHG

## 2020-01-22 VITALS — WEIGHT: 129.19 LBS | BODY MASS INDEX: 27.87 KG/M2 | HEIGHT: 57 IN

## 2020-01-22 DIAGNOSIS — O03.4: Primary | ICD-10-CM

## 2020-01-22 DIAGNOSIS — Z3A.08: ICD-10-CM

## 2020-01-22 LAB
BASOPHILS # BLD AUTO: 0.05 X10^3/UL (ref 0–0.1)
BASOPHILS NFR BLD AUTO: 1 % (ref 0–1)
EOSINOPHIL # BLD AUTO: 0.07 X10^3/UL (ref 0–0.4)
EOSINOPHIL NFR BLD AUTO: 1 % (ref 1–7)
ERYTHROCYTE [DISTWIDTH] IN BLOOD BY AUTOMATED COUNT: 13.5 % (ref 9.6–15.2)
LYMPHOCYTES # BLD AUTO: 2.39 X10^3/UL (ref 1–3.4)
LYMPHOCYTES NFR BLD AUTO: 33 % (ref 22–44)
MCH RBC QN AUTO: 29 PG (ref 27–34.8)
MCHC RBC AUTO-ENTMCNC: 33.2 G/DL (ref 32.4–35.8)
MCV RBC AUTO: 87.5 FL (ref 80–100)
MD: NO
MONOCYTES # BLD AUTO: 0.53 X10^3/UL (ref 0.2–0.8)
MONOCYTES NFR BLD AUTO: 7 % (ref 2–9)
NEUTROPHILS # BLD AUTO: 4.17 X10^3/UL (ref 1.8–6.8)
NEUTROPHILS NFR BLD AUTO: 58 % (ref 42–75)
PLATELET # BLD AUTO: 323 X10^3/UL (ref 130–400)
PMV BLD AUTO: 8.9 FL (ref 7.4–10.4)
RBC # BLD AUTO: 4.71 X10^6/UL (ref 3.82–5.3)

## 2020-01-22 PROCEDURE — 99284 EMERGENCY DEPT VISIT MOD MDM: CPT

## 2020-01-22 PROCEDURE — 36415 COLL VENOUS BLD VENIPUNCTURE: CPT

## 2020-01-22 PROCEDURE — 85025 COMPLETE CBC W/AUTO DIFF WBC: CPT

## 2020-01-22 PROCEDURE — 84702 CHORIONIC GONADOTROPIN TEST: CPT

## 2020-01-22 PROCEDURE — 76801 OB US < 14 WKS SINGLE FETUS: CPT

## 2020-01-22 NOTE — NUR
PT CURRENTLY RESTING ON GUDash. NAD NOTED. SKIN PWD. RESP EVEN AND UNLABORED. 
PT AWARE THAT WE ARE WAITING FOR LAB/IMAGING RESULTS. SIGNIFICANT OTHER AT 
BEDSIDE. CALL LIGHT WITHIN REACH. WILL CONT TO MONITOR PT.

## 2020-01-22 NOTE — NUR
THIS IS A 25 YO FEMALE WHO PRESENTED TO ER C/O NOTING BLOOD APPROX 30 MINUTES 
AGO. SMALL AMOUNT OF BLOOD WITH A SMALL 1CM CLOT NOTED BY RN WHEN PT SHOWED RN. 
PT AO X 4. SKIN PWD. RESP EVEN AND UNLABORED.

## 2020-01-27 ENCOUNTER — GYNECOLOGY VISIT (OUTPATIENT)
Dept: OBGYN | Facility: CLINIC | Age: 25
End: 2020-01-27

## 2020-01-27 ENCOUNTER — HOSPITAL ENCOUNTER (OUTPATIENT)
Dept: LAB | Facility: MEDICAL CENTER | Age: 25
End: 2020-01-27
Attending: OBSTETRICS & GYNECOLOGY

## 2020-01-27 VITALS — BODY MASS INDEX: 27.7 KG/M2 | DIASTOLIC BLOOD PRESSURE: 60 MMHG | SYSTOLIC BLOOD PRESSURE: 98 MMHG | WEIGHT: 128 LBS

## 2020-01-27 DIAGNOSIS — O03.9 MISCARRIAGE: ICD-10-CM

## 2020-01-27 LAB — B-HCG SERPL-ACNC: 144.4 MIU/ML (ref 0–5)

## 2020-01-27 PROCEDURE — 36415 COLL VENOUS BLD VENIPUNCTURE: CPT

## 2020-01-27 PROCEDURE — 99203 OFFICE O/P NEW LOW 30 MIN: CPT | Performed by: OBSTETRICS & GYNECOLOGY

## 2020-01-27 PROCEDURE — 84702 CHORIONIC GONADOTROPIN TEST: CPT

## 2020-01-27 NOTE — NON-PROVIDER
Pt here today for an ER f/u  C/o some light spotting, pt stopped heavy bleeding yesterday morning.  Phone # [phone]  Pharmacy verified.

## 2020-01-27 NOTE — PROGRESS NOTES
Chief complaint: Follow-up vaginal bleeding/ER visit    History of present illness:   24 y.o.  presents with above chief complaint.  Patient was seen in the emergency room on .  She was told at that time she is in the process of having a miscarriage.  She reports that she began to have heavy vaginal bleeding that evening which lasted approximately 3 days.  As of yesterday she just had some slight spotting.  No fevers, chills, chest pain or shortness of breath.  According to the visit at [facility] emergency room on , she was 7 weeks and 4 days pregnant.    Patient is Rh+      ROS: Pertinent positives documented in HPI and all other systems reviewed & are negative    POBHx:  2 para 1-0-1-1    PGYNHx: None, last pap unsure    All PMH, PSH, meds, allergies, social history and FH reviewed and updated today:  History reviewed. No pertinent past medical history.    Past Surgical History:   Procedure Laterality Date   • PRIMARY C SECTION  2016    Procedure: PRIMARY C SECTION;  Surgeon: Lucian Lucero M.D.;  Location: LABOR AND DELIVERY;  Service:        Allergies: No Known Allergies    Social History     Socioeconomic History   • Marital status: Single     Spouse name: Not on file   • Number of children: Not on file   • Years of education: Not on file   • Highest education level: Not on file   Occupational History   • Not on file   Social Needs   • Financial resource strain: Not on file   • Food insecurity:     Worry: Not on file     Inability: Not on file   • Transportation needs:     Medical: Not on file     Non-medical: Not on file   Tobacco Use   • Smoking status: Never Smoker   • Smokeless tobacco: Never Used   Substance and Sexual Activity   • Alcohol use: No   • Drug use: No   • Sexual activity: Not Currently     Partners: Male     Comment: none    Lifestyle   • Physical activity:     Days per week: Not on file     Minutes per session: Not on file   • Stress: Not on file    Relationships   • Social connections:     Talks on phone: Not on file     Gets together: Not on file     Attends Nondenominational service: Not on file     Active member of club or organization: Not on file     Attends meetings of clubs or organizations: Not on file     Relationship status: Not on file   • Intimate partner violence:     Fear of current or ex partner: Not on file     Emotionally abused: Not on file     Physically abused: Not on file     Forced sexual activity: Not on file   Other Topics Concern   • Not on file   Social History Narrative   • Not on file       Family History   Problem Relation Age of Onset   • Seizures Maternal Grandmother        Physical exam:  BP (!) 98/60 (BP Location: Right arm, Patient Position: Sitting)   Wt 58.1 kg (128 lb)     GENERAL APPEARANCE: healthy, alert, no distress  NECK nontender, no masses, thyromegaly or nodules  HEART RRR with normal S1 and S2 ,no murmurs, no gallops, no peripheral edema  LUNG clear to auscultation, normal respiratory effort  ABDOMEN Abdomen soft, non-tender. BS normal. No masses,  No organomegaly  FEMALE GYN: normal female external genitalia without lesions, BUS normal without lesions, vaginal mucosa pink and moist, no vaginal discharge noted, cervix normal in appearance without lesions, no CMT, urethra is normal without discharge or scarring, no bladder fullness or masses, normal anus and perineum. Uterus mobile, normal size, and nontender. Ovaries normal size and nontender bilaterally. No palpable masses.  No inguinal hernia present .  EXTREMITIES:negative clubbing, cyanosis, edema    NEURO Awake, alert and oriented x 3, Normal gait, no sensory deficits  SKIN No rashes, or ulcers or lesions seen  PSYCHIATRIC: Patient shows appropriate affect, is alert and oriented x3, intact judgment and insight.    Transvaginal sound performed myself.  Shows normal-appearing uterus with no evidence of intrauterine pregnancy or retained products of conception.   Bilateral normal-appearing ovaries.  Cervix 3.8 cm. no free pelvic fluid noted        Assessment:  1. Miscarriage  HCG QUANTITATIVE       Plan:    Patient is Rh+.  No RhoGam needed.  Will follow hCG level until negative.    Patient may attempt pregnancy once this issue is resolved.  Lab orders given to patient.  Will call patient with results    All questions answered

## 2020-02-03 ENCOUNTER — TELEPHONE (OUTPATIENT)
Dept: OBGYN | Facility: CLINIC | Age: 25
End: 2020-02-03

## 2020-02-03 NOTE — TELEPHONE ENCOUNTER
LVM to call back, I also had [facility] order her next HCG lab to be drawn. Pt needs to be made aware to have her next lab drawn.  ----- Message from Aris Hair M.D. sent at 1/29/2020  8:23 AM PST -----  Will need another hCG level drawn next week.  Will follow until negative

## 2020-04-08 ENCOUNTER — HOSPITAL ENCOUNTER (OUTPATIENT)
Dept: LAB | Facility: MEDICAL CENTER | Age: 25
End: 2020-04-08
Attending: OBSTETRICS & GYNECOLOGY
Payer: COMMERCIAL

## 2020-04-08 ENCOUNTER — INITIAL PRENATAL (OUTPATIENT)
Dept: OBGYN | Facility: CLINIC | Age: 25
End: 2020-04-08
Payer: COMMERCIAL

## 2020-04-08 VITALS
DIASTOLIC BLOOD PRESSURE: 60 MMHG | WEIGHT: 142 LBS | BODY MASS INDEX: 30.63 KG/M2 | SYSTOLIC BLOOD PRESSURE: 114 MMHG | HEIGHT: 57 IN

## 2020-04-08 DIAGNOSIS — O36.80X0 PREGNANCY OF UNKNOWN ANATOMIC LOCATION: ICD-10-CM

## 2020-04-08 LAB
B-HCG SERPL-ACNC: 3738 MIU/ML (ref 0–5)
IN CLINIC OB SCAN: NORMAL

## 2020-04-08 PROCEDURE — 84702 CHORIONIC GONADOTROPIN TEST: CPT

## 2020-04-08 PROCEDURE — 36415 COLL VENOUS BLD VENIPUNCTURE: CPT

## 2020-04-08 PROCEDURE — 99214 OFFICE O/P EST MOD 30 MIN: CPT | Mod: 25 | Performed by: OBSTETRICS & GYNECOLOGY

## 2020-04-08 PROCEDURE — 76830 TRANSVAGINAL US NON-OB: CPT | Performed by: OBSTETRICS & GYNECOLOGY

## 2020-04-08 NOTE — PROGRESS NOTES
Pt. Here for  visit today.  # [phone]  Pt. States she is experiencing pressure near her belly button   Pharmacy verified  Chaperone offered and not indicated

## 2020-04-08 NOTE — PROGRESS NOTES
CC: Confirmation of Pregnancy    HPI: Pt is a 25 yo  lmp 20 who presents for evaluation of amenorrhea.  She has had no bleeding since her last menses.  A urine pregnancy test was positive.  She denies vaginal spotting or pain.  She notes nausea, no vomiting.  She had a recent miscarriage in January.    History reviewed. No pertinent past medical history.    Past Surgical History:   Procedure Laterality Date   • PRIMARY C SECTION  2016    Procedure: PRIMARY C SECTION;  Surgeon: Lucian Lucero M.D.;  Location: LABOR AND DELIVERY;  Service:          Current Outpatient Medications:   •  Prenatal MV-Min-Fe Fum-FA-DHA (PRENATAL 1 PO), Take  by mouth., Disp: , Rfl:   •  Multiple Vitamin (MULTI-VITAMINS PO), Take  by mouth., Disp: , Rfl:   •  etonogestrel (NEXPLANON) 68 MG Implant implant, Inject 1 Each as instructed Once., Disp: , Rfl:     Patient has no known allergies.    Family History   Problem Relation Age of Onset   • Seizures Maternal Grandmother        Social History     Socioeconomic History   • Marital status: Single     Spouse name: Not on file   • Number of children: Not on file   • Years of education: Not on file   • Highest education level: Not on file   Occupational History   • Not on file   Social Needs   • Financial resource strain: Not on file   • Food insecurity     Worry: Not on file     Inability: Not on file   • Transportation needs     Medical: Not on file     Non-medical: Not on file   Tobacco Use   • Smoking status: Never Smoker   • Smokeless tobacco: Never Used   Substance and Sexual Activity   • Alcohol use: No   • Drug use: No   • Sexual activity: Not Currently     Partners: Male     Comment: None    Lifestyle   • Physical activity     Days per week: Not on file     Minutes per session: Not on file   • Stress: Not on file   Relationships   • Social connections     Talks on phone: Not on file     Gets together: Not on file     Attends Restorationist service: Not on file     Active  "member of club or organization: Not on file     Attends meetings of clubs or organizations: Not on file     Relationship status: Not on file   • Intimate partner violence     Fear of current or ex partner: Not on file     Emotionally abused: Not on file     Physically abused: Not on file     Forced sexual activity: Not on file   Other Topics Concern   • Not on file   Social History Narrative   • Not on file       OB History    Para Term  AB Living   3 1 1 0 1 1   SAB TAB Ectopic Molar Multiple Live Births   1 0 0 0 0 1       ROS: negative for dizziness, SOB, chest pain, palpitations, dysuria, vaginal discharge.    /60   Ht 1.448 m (4' 9\")   Wt 64.4 kg (142 lb)     GENERAL: Alert, in no apparent distress  PSYCHIATRIC: Appropriate affect, intact insight and judgement.  ABDOMEN: Soft, nontender, nondistended.  No palpable masses. No hepatosplenomegaly.   No rebound or guarding.  No inguinal lymphadenopathy.  BACK: No CVA tenderness  EXTREMITIES: No edema, no calf tenderness.    GENITOURINARY:  Normal external genitalia, no lesions.  Normal urethral meatus, no masses or tenderness.  Normal bladder without fullness or masses.  Vagina well estrogenized, no vaginal discharge or lesions.  Cervix normal length, nontender.  Uterus normal size, shape, and contour, nontender.  Adnexa nontender, no masses.  Normal anus and perineum.      TRANSVAGINAL ULTRASOUND - performed and interpreted by me    A 0.61 cm fluid collection is present within the uterus.  There is no yolk sac or fetal pole visualized.  The fluid collection is consistent with 5 weeks and 2 days.    The right ovary measures 2.1 x 2.5 x 3.61 cm.  The left ovary measures 3.1 x 1.8 x 3.12 cm.  No adnexal masses are visualized.    There is 1.1 cm of  fluid in the cul de sac.    Cervical length = 4.2  cm.      ASSESSMENT/PLAN:  Pregnancy of unknown location -suspect that this is an early IUP, but cannot rule out ectopic at this time.  The patient " is asymptomatic, but there is fluid present in the cul-de-sac.  No adnexal masses are visible on ultrasound.  Quantitative hCG ordered for today and in 48 hours.  The patient is to follow-up Friday afternoon for review of hCG levels.  Bleeding and pain precautions were discussed.  If she has evidence of severe pain or vaginal bleeding, she was instructed to go to the emergency room.

## 2020-04-10 ENCOUNTER — HOSPITAL ENCOUNTER (OUTPATIENT)
Dept: LAB | Facility: MEDICAL CENTER | Age: 25
End: 2020-04-10
Attending: OBSTETRICS & GYNECOLOGY
Payer: COMMERCIAL

## 2020-04-10 DIAGNOSIS — O36.80X0 PREGNANCY OF UNKNOWN ANATOMIC LOCATION: ICD-10-CM

## 2020-04-10 LAB — B-HCG SERPL-ACNC: 5919 MIU/ML (ref 0–5)

## 2020-04-10 PROCEDURE — 36415 COLL VENOUS BLD VENIPUNCTURE: CPT

## 2020-04-10 PROCEDURE — 84702 CHORIONIC GONADOTROPIN TEST: CPT

## 2020-04-13 ENCOUNTER — INITIAL PRENATAL (OUTPATIENT)
Dept: OBGYN | Facility: CLINIC | Age: 25
End: 2020-04-13
Payer: COMMERCIAL

## 2020-04-13 VITALS — SYSTOLIC BLOOD PRESSURE: 118 MMHG | DIASTOLIC BLOOD PRESSURE: 72 MMHG | WEIGHT: 140 LBS | BODY MASS INDEX: 30.3 KG/M2

## 2020-04-13 DIAGNOSIS — Z32.01 POSITIVE PREGNANCY TEST: ICD-10-CM

## 2020-04-13 DIAGNOSIS — N91.1 AMENORRHEA, SECONDARY: ICD-10-CM

## 2020-04-13 PROCEDURE — 99213 OFFICE O/P EST LOW 20 MIN: CPT | Mod: 25 | Performed by: OBSTETRICS & GYNECOLOGY

## 2020-04-13 PROCEDURE — 76830 TRANSVAGINAL US NON-OB: CPT | Performed by: OBSTETRICS & GYNECOLOGY

## 2020-04-13 NOTE — PROGRESS NOTES
Subjective:      Johanny Hubbard is a 24 y.o. female who presents for pregnancy viability study            HPI patient is a 24-year-old -0-1-1 who presents today for follow-up study of amenorrhea and positive home pregnancy test.  She was seen last week and had an ultrasound which shows intrauterine fluid collection with no IUP.  She was sent for serial beta-hCG and presents today for follow-up viability study.  She reports no symptoms currently and no bleeding or spotting, no cramping or pain.  No nausea vomiting.  Patient is on prenatal vitamins    ROS all organ systems are reviewed and are currently negative     Objective:     /72   Wt 63.5 kg (140 lb)   LMP 2019 (Approximate)   BMI 30.30 kg/m²      Physical Exam  Vitals signs and nursing note reviewed. Exam conducted with a chaperone present.   Constitutional:       General: She is not in acute distress.     Appearance: Normal appearance. She is not toxic-appearing.   HENT:      Head: Normocephalic and atraumatic.   Neck:      Musculoskeletal: Normal range of motion and neck supple. No neck rigidity.   Cardiovascular:      Rate and Rhythm: Normal rate and regular rhythm.      Pulses: Normal pulses.      Heart sounds: Normal heart sounds. No murmur.   Pulmonary:      Effort: Pulmonary effort is normal. No respiratory distress.      Breath sounds: Normal breath sounds. No wheezing.   Abdominal:      General: Abdomen is flat. Bowel sounds are normal. There is no distension.      Palpations: Abdomen is soft.      Tenderness: There is no abdominal tenderness.   Genitourinary:     General: Normal vulva.      Vagina: No vaginal discharge.   Musculoskeletal: Normal range of motion.      Right lower leg: No edema.      Left lower leg: No edema.   Lymphadenopathy:      Cervical: No cervical adenopathy.   Neurological:      General: No focal deficit present.      Mental Status: She is alert and oriented to person, place, and time.      Gait: Gait  normal.   Psychiatric:         Mood and Affect: Mood normal.         Behavior: Behavior normal.         Thought Content: Thought content normal.         Judgment: Judgment normal.            Results for JUAREZ SORENSEN (MRN [id number]) as of 4/13/2020 15:17   Ref. Range 4/8/2020 16:29 4/10/2020 09:39   Bhcg Latest Ref Range: 0.0 - 5.0 mIU/mL 3738.0 (H) 5919.0 (H)        Transvaginal ultrasound was performed and read by me:    Early single twin intrauterine pregnancy noted  Cardiac flutter, small fetal pole and yolk sac were noted  Fetal heart rate was 101 bpm  Crown-rump length gives a gestational age of 6 weeks and 1 day  Gestational sac diameter gives a gestational age of 6 weeks  EDC by [facility] is 12/6/2020  No adnexal masses were noted  No pelvic free fluid was noted    Impression: Early intrauterine pregnancy at 6 weeks and 1 day gestation with low fetal heart rate.  hCG has increased appropriately.  Findings were discussed.  We will follow-up with repeat ultrasound in 1 week    Assessment/Plan:       1. Amenorrhea, secondary  24-year-old G 3 P1 presenting for fetal viability study.  Early intrauterine pregnancy confirmed at 6 weeks and 1 day gestation with low cardiac heart rate documented.  Findings were discussed.  Patient will follow-up in 1 week for repeat ultrasound  Pregnancy precautions and restrictions were discussed    2. Positive pregnancy test    3.  Precautions and plan of care were reviewed.  Patient to follow-up in 1 week for repeat ultrasound

## 2020-04-22 ENCOUNTER — INITIAL PRENATAL (OUTPATIENT)
Dept: OBGYN | Facility: CLINIC | Age: 25
End: 2020-04-22

## 2020-04-22 VITALS — BODY MASS INDEX: 29.86 KG/M2 | DIASTOLIC BLOOD PRESSURE: 62 MMHG | WEIGHT: 138 LBS | SYSTOLIC BLOOD PRESSURE: 106 MMHG

## 2020-04-22 DIAGNOSIS — O20.0 THREATENED MISCARRIAGE: ICD-10-CM

## 2020-04-22 PROCEDURE — 99213 OFFICE O/P EST LOW 20 MIN: CPT | Mod: 25 | Performed by: OBSTETRICS & GYNECOLOGY

## 2020-04-22 PROCEDURE — 76830 TRANSVAGINAL US NON-OB: CPT | Performed by: OBSTETRICS & GYNECOLOGY

## 2020-04-22 NOTE — PROGRESS NOTES
Chief complaint;  This patient is a 24 y.o. female , Patient's last menstrual period was 2019 (approximate). presents complaining of dysfunctional uterine bleeding.    Review of systems-denies; chest pain, shortness of breath, fever, chills, abdominal pain  Past OB history-  OB History    Para Term  AB Living   3 1 1   1 1   SAB TAB Ectopic Molar Multiple Live Births   1         1      # Outcome Date GA Lbr Diogenes/2nd Weight Sex Delivery Anes PTL Lv   3 Current            2 SAB 20           1 Term 16 41w1d  2.77 kg (6 lb 1.7 oz) M CS-LTranv   XIOMARA     Past surgical history-   Past Surgical History:   Procedure Laterality Date   • PRIMARY C SECTION  2016    Procedure: PRIMARY C SECTION;  Surgeon: Lucian Lucero M.D.;  Location: LABOR AND DELIVERY;  Service:      Past medical history- History reviewed. No pertinent past medical history.  Allergies- Patient has no known allergies.  Present medications-   Outpatient Encounter Medications as of 2020   Medication Sig Dispense Refill   • Prenatal MV-Min-Fe Fum-FA-DHA (PRENATAL 1 PO) Take  by mouth.     • Multiple Vitamin (MULTI-VITAMINS PO) Take  by mouth.     • etonogestrel (NEXPLANON) 68 MG Implant implant Inject 1 Each as instructed Once.       No facility-administered encounter medications on file as of 2020.      Family history- no history of breast or ovarian cancer  Social history-   Social History     Socioeconomic History   • Marital status: Single     Spouse name: Not on file   • Number of children: Not on file   • Years of education: Not on file   • Highest education level: Not on file   Occupational History   • Not on file   Social Needs   • Financial resource strain: Not on file   • Food insecurity     Worry: Not on file     Inability: Not on file   • Transportation needs     Medical: Not on file     Non-medical: Not on file   Tobacco Use   • Smoking status: Never Smoker   • Smokeless tobacco: Never Used    Substance and Sexual Activity   • Alcohol use: No   • Drug use: No   • Sexual activity: Not Currently     Partners: Male     Comment: None    Lifestyle   • Physical activity     Days per week: Not on file     Minutes per session: Not on file   • Stress: Not on file   Relationships   • Social connections     Talks on phone: Not on file     Gets together: Not on file     Attends [employer] service: Not on file     Active member of club or organization: Not on file     Attends meetings of clubs or organizations: Not on file     Relationship status: Not on file   • Intimate partner violence     Fear of current or ex partner: Not on file     Emotionally abused: Not on file     Physically abused: Not on file     Forced sexual activity: Not on file   Other Topics Concern   • Not on file   Social History Narrative   • Not on file         Physical examination;   Alert and oriented x3  General-well-developed well-nourished female in no apparent distress  Vitals:    04/22/20 1423   BP: 106/62   Weight: 62.6 kg (138 lb)     Skin is warm and dry   HEENT-normocephalic,nontraumatic,PERRLA,EOMI  Throat- no edema or erythema  Neck-supple  Cardiovascular-regular rate and rhythm, normal S1-S2 without murmurs or gallops  Lungs-clear to auscultation bilaterally  Back-negative CVA tenderness  Abdomen-nondistended positive bowel sounds soft nontender without masses or hepatosplenomegaly  Pelvic examination;  External genitalia-without lesions  Vagina-no blood, no discharge  Cervix-closed,no gross lesions  Uterus-  8 weeks size, nontender  Adnexa- without mass or tenderness  Extremities-without cyanosis clubbing or edema  Neurologic-grossly intact    Transvaginal ultrasound; as performed and read by myself; intrauterine gestational sac containing fetal pole and yolk sac crown-rump length consistent with 7 weeks 1 day but measurement of crown-rump length has some artifact  Fetal heart rate 152 bpm    Impression;  Threatened miscarriage;  viable pregnancy confirmed    Plan  Needs initial OB

## 2020-04-22 NOTE — NON-PROVIDER
today/ 3rd scan  LMP 02/25/2020  PNV yes  Phone # [phone]  Pharmacy verified with patient  Patient states no complaints today.

## 2020-04-29 ENCOUNTER — APPOINTMENT (OUTPATIENT)
Dept: OBGYN | Facility: CLINIC | Age: 25
End: 2020-04-29
Payer: COMMERCIAL

## 2020-05-11 ENCOUNTER — HOSPITAL ENCOUNTER (OUTPATIENT)
Facility: MEDICAL CENTER | Age: 25
End: 2020-05-11
Attending: NURSE PRACTITIONER
Payer: COMMERCIAL

## 2020-05-11 ENCOUNTER — INITIAL PRENATAL (OUTPATIENT)
Dept: OBGYN | Facility: CLINIC | Age: 25
End: 2020-05-11

## 2020-05-11 VITALS — SYSTOLIC BLOOD PRESSURE: 102 MMHG | WEIGHT: 139 LBS | DIASTOLIC BLOOD PRESSURE: 58 MMHG | BODY MASS INDEX: 30.08 KG/M2

## 2020-05-11 DIAGNOSIS — Z32.01 POSITIVE PREGNANCY TEST: ICD-10-CM

## 2020-05-11 DIAGNOSIS — O34.219 HISTORY OF CESAREAN DELIVERY, CURRENTLY PREGNANT: ICD-10-CM

## 2020-05-11 DIAGNOSIS — N93.8 DUB (DYSFUNCTIONAL UTERINE BLEEDING): ICD-10-CM

## 2020-05-11 DIAGNOSIS — Z34.81 ENCOUNTER FOR SUPERVISION OF OTHER NORMAL PREGNANCY, FIRST TRIMESTER: ICD-10-CM
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
## 2020-05-11 DIAGNOSIS — Z34.81 ENCOUNTER FOR SUPERVISION OF OTHER NORMAL PREGNANCY, FIRST TRIMESTER: Primary | ICD-10-CM
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
## 2020-05-11 LAB
APPEARANCE UR: NORMAL
BILIRUB UR STRIP-MCNC: NORMAL MG/DL
COLOR UR AUTO: NORMAL
GLUCOSE UR STRIP.AUTO-MCNC: NEGATIVE MG/DL
KETONES UR STRIP.AUTO-MCNC: NEGATIVE MG/DL
LEUKOCYTE ESTERASE UR QL STRIP.AUTO: NEGATIVE
NITRITE UR QL STRIP.AUTO: NEGATIVE
PH UR STRIP.AUTO: 6 [PH] (ref 5–8)
PROT UR QL STRIP: NEGATIVE MG/DL
RBC UR QL AUTO: NEGATIVE
SP GR UR STRIP.AUTO: 1.02
UROBILINOGEN UR STRIP-MCNC: NORMAL MG/DL

## 2020-05-11 PROCEDURE — 81002 URINALYSIS NONAUTO W/O SCOPE: CPT | Performed by: NURSE PRACTITIONER

## 2020-05-11 PROCEDURE — 0500F INITIAL PRENATAL CARE VISIT: CPT | Performed by: NURSE PRACTITIONER

## 2020-05-11 ASSESSMENT — ENCOUNTER SYMPTOMS
NEUROLOGICAL NEGATIVE: 1
EYES NEGATIVE: 1
CARDIOVASCULAR NEGATIVE: 1
CONSTITUTIONAL NEGATIVE: 1
MUSCULOSKELETAL NEGATIVE: 1
PSYCHIATRIC NEGATIVE: 1
GASTROINTESTINAL NEGATIVE: 1
RESPIRATORY NEGATIVE: 1

## 2020-05-11 ASSESSMENT — EDINBURGH POSTNATAL DEPRESSION SCALE (EPDS)
I HAVE FELT SCARED OR PANICKY FOR NO GOOD REASON: YES, SOMETIMES
THE THOUGHT OF HARMING MYSELF HAS OCCURRED TO ME: NEVER
TOTAL SCORE: 13
THINGS HAVE BEEN GETTING ON TOP OF ME: YES, SOMETIMES I HAVEN'T BEEN COPING AS WELL AS USUAL
I HAVE LOOKED FORWARD WITH ENJOYMENT TO THINGS: RATHER LESS THAN I USED TO
I HAVE BEEN SO UNHAPPY THAT I HAVE BEEN CRYING: YES, QUITE OFTEN
I HAVE BEEN ANXIOUS OR WORRIED FOR NO GOOD REASON: HARDLY EVER
I HAVE FELT SAD OR MISERABLE: YES, QUITE OFTEN
I HAVE BEEN SO UNHAPPY THAT I HAVE HAD DIFFICULTY SLEEPING: NOT VERY OFTEN
I HAVE BEEN ABLE TO LAUGH AND SEE THE FUNNY SIDE OF THINGS: AS MUCH AS I ALWAYS COULD
I HAVE BLAMED MYSELF UNNECESSARILY WHEN THINGS WENT WRONG: YES, SOME OF THE TIME
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
## 2020-05-11 NOTE — LETTER
Cystic Fibrosis Carrier Testing  Johanny Hubbard    The following information is about a blood test that can be done to determine if you and/or your partner carry the gene for cystic fibrosis.    WHAT IS CYSTIC FIBROSIS?  · Cystic fibrosis (CF) is an inherited disease that affects more than 25,000 American children and young adults.  · Symptoms of CF vary but include lung congestion, pneumonia, diarrhea and poor growth.  Most people with CF have severe medical problems and some die at a young age.  Others have so few symptoms they are unaware they have CF.  · CF does not affect intelligence.  · Although there is no cure for CF at this time, scientists are making progress in improving treatment and in searching for a cure.  In the past many people with CF  at a very young age.  Today, many are living into their 20’s and 30’s.    IS THERE A CHANCE MY BABY COULD HAVE CYSTIC FIBROSIS?  · You can have a child with CF even if there is no history in your family (see chart below).  · CF testing can help determine if you are a carrier and at risk to have a child with CF.  Note: if both parents are carriers, there is a 1 in 4 (25%) chance with each pregnancy that they will have a child with CF.  · Carriers have one normal CF gene and one altered CF gene.  · People with CF have two altered CF genes.  · Most people have two normal copies of the CF gene.    Approximate risk that a couple with no family history of cystic fibrosis will have a child with cystic fibrosis:    Ethnic background / Risk     couple:  1 in 2,500   couple:  1 in 15,000            couple:  1 in 8,000     American couple:  1 in 32,000     WHAT TESTING IS AVAILABLE?  · There is a blood test that can be done to find out if you or your partner is a carrier.  · It is important to understand that CF carrier testing does not detect all CF carriers.  · If the test shows that you are both CF carriers, you unborn baby  can be tested to find out if the baby has CF.    HOW MUCH DOES IT COST TO HAVE CYSTIC FIBROSIS CARRIER TESTING?  · Cost and insurance coverage for CF carrier testing vary depending upon the laboratory used and your insurance policy.  · The average cost for CF carrier testing is $300 per person.  · Your genetic counselor can provide you with more information about cystic fibrosis carrier testing.    _____  Yes, I am interested in discussing carrier testing with a genetic counselor.    _____  No, I am not interested in CF carrier testing or in receiving more information about CF carrier testing.      Client signature: ________________________________________  5/11/2020

## 2020-05-11 NOTE — PROGRESS NOTES
NOB today  LMP:02/25/2020  Last pap:  Phone #[phone]  Pharmacy confirmed  C/o Pt states having some nausea. She also states that she feels a burning sensation on her c/s scar.

## 2020-05-12 LAB
C TRACH DNA GENITAL QL NAA+PROBE: NEGATIVE
CYTOLOGY REG CYTOL: NORMAL
N GONORRHOEA DNA GENITAL QL NAA+PROBE: NEGATIVE
SPECIMEN SOURCE: NORMAL

## 2020-05-15 ENCOUNTER — HOSPITAL ENCOUNTER (OUTPATIENT)
Dept: LAB | Facility: MEDICAL CENTER | Age: 25
End: 2020-05-15
Attending: NURSE PRACTITIONER
Payer: COMMERCIAL

## 2020-05-15 DIAGNOSIS — Z34.81 ENCOUNTER FOR SUPERVISION OF OTHER NORMAL PREGNANCY, FIRST TRIMESTER: ICD-10-CM

## 2020-05-15 LAB
ABO GROUP BLD: NORMAL
APPEARANCE UR: ABNORMAL
BACTERIA #/AREA URNS HPF: ABNORMAL /HPF
BASOPHILS # BLD AUTO: 0.3 % (ref 0–1.8)
BASOPHILS # BLD: 0.03 K/UL (ref 0–0.12)
BILIRUB UR QL STRIP.AUTO: NEGATIVE
BLD GP AB SCN SERPL QL: NORMAL
COLOR UR: ABNORMAL
EOSINOPHIL # BLD AUTO: 0.09 K/UL (ref 0–0.51)
EOSINOPHIL NFR BLD: 1 % (ref 0–6.9)
EPI CELLS #/AREA URNS HPF: ABNORMAL /HPF
ERYTHROCYTE [DISTWIDTH] IN BLOOD BY AUTOMATED COUNT: 40.7 FL (ref 35.9–50)
GLUCOSE UR STRIP.AUTO-MCNC: NEGATIVE MG/DL
HBV SURFACE AG SER QL: ABNORMAL
HCT VFR BLD AUTO: 40.3 % (ref 37–47)
HCV AB SER QL: NORMAL
HGB BLD-MCNC: 13.4 G/DL (ref 12–16)
HIV 1+2 AB+HIV1 P24 AG SERPL QL IA: NORMAL
IMM GRANULOCYTES # BLD AUTO: 0.02 K/UL (ref 0–0.11)
IMM GRANULOCYTES NFR BLD AUTO: 0.2 % (ref 0–0.9)
KETONES UR STRIP.AUTO-MCNC: ABNORMAL MG/DL
LEUKOCYTE ESTERASE UR QL STRIP.AUTO: NEGATIVE
LYMPHOCYTES # BLD AUTO: 2.23 K/UL (ref 1–4.8)
LYMPHOCYTES NFR BLD: 24.6 % (ref 22–41)
MCH RBC QN AUTO: 29.4 PG (ref 27–33)
MCHC RBC AUTO-ENTMCNC: 33.3 G/DL (ref 33.6–35)
MCV RBC AUTO: 88.4 FL (ref 81.4–97.8)
MICRO URNS: ABNORMAL
MONOCYTES # BLD AUTO: 0.6 K/UL (ref 0–0.85)
MONOCYTES NFR BLD AUTO: 6.6 % (ref 0–13.4)
NEUTROPHILS # BLD AUTO: 6.08 K/UL (ref 2–7.15)
NEUTROPHILS NFR BLD: 67.3 % (ref 44–72)
NITRITE UR QL STRIP.AUTO: NEGATIVE
NRBC # BLD AUTO: 0 K/UL
NRBC BLD-RTO: 0 /100 WBC
PH UR STRIP.AUTO: 5.5 [PH] (ref 5–8)
PLATELET # BLD AUTO: 287 K/UL (ref 164–446)
PMV BLD AUTO: 11 FL (ref 9–12.9)
PROT UR QL STRIP: NEGATIVE MG/DL
RBC # BLD AUTO: 4.56 M/UL (ref 4.2–5.4)
RBC # URNS HPF: ABNORMAL /HPF
RBC UR QL AUTO: NEGATIVE
RH BLD: NORMAL
RUBV AB SER QL: 29.8 IU/ML
SP GR UR STRIP.AUTO: 1.03
TREPONEMA PALLIDUM IGG+IGM AB [PRESENCE] IN SERUM OR PLASMA BY IMMUNOASSAY: ABNORMAL
UROBILINOGEN UR STRIP.AUTO-MCNC: 0.2 MG/DL
WBC # BLD AUTO: 9.1 K/UL (ref 4.8–10.8)
WBC #/AREA URNS HPF: ABNORMAL /HPF

## 2020-05-15 PROCEDURE — 86901 BLOOD TYPING SEROLOGIC RH(D): CPT

## 2020-05-15 PROCEDURE — 85025 COMPLETE CBC W/AUTO DIFF WBC: CPT

## 2020-05-15 PROCEDURE — 36415 COLL VENOUS BLD VENIPUNCTURE: CPT

## 2020-05-15 PROCEDURE — 87340 HEPATITIS B SURFACE AG IA: CPT

## 2020-05-15 PROCEDURE — 87389 HIV-1 AG W/HIV-1&-2 AB AG IA: CPT

## 2020-05-15 PROCEDURE — 86850 RBC ANTIBODY SCREEN: CPT

## 2020-05-15 PROCEDURE — 80307 DRUG TEST PRSMV CHEM ANLYZR: CPT

## 2020-05-15 PROCEDURE — 86762 RUBELLA ANTIBODY: CPT

## 2020-05-15 PROCEDURE — 86900 BLOOD TYPING SEROLOGIC ABO: CPT

## 2020-05-15 PROCEDURE — 81001 URINALYSIS AUTO W/SCOPE: CPT

## 2020-05-15 PROCEDURE — 86780 TREPONEMA PALLIDUM: CPT

## 2020-05-15 PROCEDURE — 86803 HEPATITIS C AB TEST: CPT

## 2020-05-17 LAB
AMPHET CTO UR CFM-MCNC: NEGATIVE NG/ML
BARBITURATES CTO UR CFM-MCNC: NEGATIVE NG/ML
BENZODIAZ CTO UR CFM-MCNC: NEGATIVE NG/ML
CANNABINOIDS CTO UR CFM-MCNC: NEGATIVE NG/ML
COCAINE CTO UR CFM-MCNC: NEGATIVE NG/ML
DRUG COMMENT 753798: NORMAL
METHADONE CTO UR CFM-MCNC: NEGATIVE NG/ML
OPIATES CTO UR CFM-MCNC: NEGATIVE NG/ML
PCP CTO UR CFM-MCNC: NEGATIVE NG/ML
PROPOXYPH CTO UR CFM-MCNC: NEGATIVE NG/ML

## 2020-05-29 ENCOUNTER — ROUTINE PRENATAL (OUTPATIENT)
Dept: OBGYN | Facility: CLINIC | Age: 25
End: 2020-05-29
Payer: COMMERCIAL

## 2020-05-29 VITALS — DIASTOLIC BLOOD PRESSURE: 54 MMHG | BODY MASS INDEX: 29.86 KG/M2 | WEIGHT: 138 LBS | SYSTOLIC BLOOD PRESSURE: 100 MMHG

## 2020-05-29 DIAGNOSIS — O34.219 HISTORY OF CESAREAN DELIVERY, CURRENTLY PREGNANT: ICD-10-CM

## 2020-05-29 PROCEDURE — 90040 PR PRENATAL FOLLOW UP: CPT | Performed by: OBSTETRICS & GYNECOLOGY

## 2020-05-29 NOTE — PROGRESS NOTES
OB follow up   no fetal movement yet.  No VB, LOF or UC's.  PNP done  Us scheduled for 7/27/2020  Phone # [phone]  Preferred pharmacy confirmed.
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
## 2020-06-26 ENCOUNTER — ROUTINE PRENATAL (OUTPATIENT)
Dept: OBGYN | Facility: CLINIC | Age: 25
End: 2020-06-26
Payer: COMMERCIAL

## 2020-06-26 VITALS — DIASTOLIC BLOOD PRESSURE: 62 MMHG | SYSTOLIC BLOOD PRESSURE: 108 MMHG | WEIGHT: 139 LBS | BODY MASS INDEX: 30.08 KG/M2

## 2020-06-26 DIAGNOSIS — Z34.82 ENCOUNTER FOR SUPERVISION OF OTHER NORMAL PREGNANCY IN SECOND TRIMESTER: Primary | ICD-10-CM

## 2020-06-26 PROCEDURE — 90040 PR PRENATAL FOLLOW UP: CPT | Performed by: NURSE PRACTITIONER
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
## 2020-06-26 NOTE — PROGRESS NOTES
Pt here today for OB follow up  Pt states she is having abdominal pain and pelvic pain  Reports +  Good # [phone]  Pharmacy Confirmed.  Chaperone offered and declined.   Declined AFP

## 2020-07-17 ENCOUNTER — ROUTINE PRENATAL (OUTPATIENT)
Dept: OBGYN | Facility: CLINIC | Age: 25
End: 2020-07-17
Payer: COMMERCIAL

## 2020-07-17 VITALS — BODY MASS INDEX: 30.79 KG/M2 | WEIGHT: 142.3 LBS | SYSTOLIC BLOOD PRESSURE: 120 MMHG | DIASTOLIC BLOOD PRESSURE: 50 MMHG

## 2020-07-17 DIAGNOSIS — O09.892 SUPERVISION OF OTHER HIGH RISK PREGNANCIES, SECOND TRIMESTER: ICD-10-CM

## 2020-07-17 DIAGNOSIS — O34.219 HISTORY OF CESAREAN DELIVERY, CURRENTLY PREGNANT: ICD-10-CM
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
## 2020-07-17 PROCEDURE — 90040 PR PRENATAL FOLLOW UP: CPT | Performed by: NURSE PRACTITIONER

## 2020-07-17 NOTE — PATIENT INSTRUCTIONS
P:  1.  Reviewed labs w pt.        2.  Declines AFP.        3.  US is 7/27/20.        4.  Questions answered.        5.  Encouraged adequate water intake.        6.  F/u 4 wks.        7.  D/w pt helps for hip pain, pt may benefit from PT, referral placed.        8.  Pt would not like to do her RCS on 12/1/20.

## 2020-07-17 NOTE — PROGRESS NOTES
OB follow up   + fetal movement. Active   No VB, LOF or UC's.  Wt: 142.3LBS      BP:120/50  Phone # [phone]  Preferred pharmacy confirmed.  C/o  Constant pain on right side  Up to date on everything  US on 07/27/2020

## 2020-07-27 ENCOUNTER — APPOINTMENT (OUTPATIENT)
Dept: RADIOLOGY | Facility: IMAGING CENTER | Age: 25
End: 2020-07-27
Attending: NURSE PRACTITIONER
Payer: COMMERCIAL

## 2020-07-27 DIAGNOSIS — Z34.81 ENCOUNTER FOR SUPERVISION OF OTHER NORMAL PREGNANCY, FIRST TRIMESTER: ICD-10-CM

## 2020-07-27 PROCEDURE — 76805 OB US >/= 14 WKS SNGL FETUS: CPT | Mod: TC | Performed by: NURSE PRACTITIONER

## 2020-08-07 ENCOUNTER — APPOINTMENT (OUTPATIENT)
Dept: OBGYN | Facility: CLINIC | Age: 25
End: 2020-08-07
Payer: COMMERCIAL

## 2020-08-14 ENCOUNTER — ROUTINE PRENATAL (OUTPATIENT)
Dept: OBGYN | Facility: CLINIC | Age: 25
End: 2020-08-14
Payer: COMMERCIAL

## 2020-08-14 VITALS — BODY MASS INDEX: 32.2 KG/M2 | SYSTOLIC BLOOD PRESSURE: 112 MMHG | DIASTOLIC BLOOD PRESSURE: 60 MMHG | WEIGHT: 148.8 LBS

## 2020-08-14 DIAGNOSIS — O09.892 SUPERVISION OF OTHER HIGH RISK PREGNANCIES, SECOND TRIMESTER: ICD-10-CM

## 2020-08-14 PROCEDURE — 90040 PR PRENATAL FOLLOW UP: CPT | Performed by: PHYSICIAN ASSISTANT

## 2020-08-14 NOTE — PROGRESS NOTES
Pt. Here for OB/FU. Reports Good FM.   Good # [phone]  Pt states having tightening of her stomach and feels pelvic pressure.   Pharmacy verified.
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
## 2020-08-21 ENCOUNTER — ROUTINE PRENATAL (OUTPATIENT)
Dept: OBGYN | Facility: CLINIC | Age: 25
End: 2020-08-21
Payer: COMMERCIAL

## 2020-08-21 VITALS — BODY MASS INDEX: 32.68 KG/M2 | DIASTOLIC BLOOD PRESSURE: 62 MMHG | SYSTOLIC BLOOD PRESSURE: 112 MMHG | WEIGHT: 151 LBS

## 2020-08-21 DIAGNOSIS — O34.219 HISTORY OF CESAREAN DELIVERY, CURRENTLY PREGNANT: ICD-10-CM

## 2020-08-21 DIAGNOSIS — Z34.82 ENCOUNTER FOR SUPERVISION OF OTHER NORMAL PREGNANCY IN SECOND TRIMESTER: ICD-10-CM

## 2020-08-21 DIAGNOSIS — O28.3 ABNORMAL FETAL ULTRASOUND: ICD-10-CM

## 2020-08-21 PROCEDURE — 90040 PR PRENATAL FOLLOW UP: CPT | Performed by: ADVANCED PRACTICE MIDWIFE
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
## 2020-08-21 NOTE — PROGRESS NOTES
OB follow up   + fetal movement.  No VB, LOF or UC's.  Wt: 151lb      BP:112/62  Phone #  [phone]  Preferred pharmacy confirmed.  3rd trimester lab orders pended and instructions given to patient

## 2020-09-14 ENCOUNTER — ROUTINE PRENATAL (OUTPATIENT)
Dept: OBGYN | Facility: CLINIC | Age: 25
End: 2020-09-14
Payer: COMMERCIAL

## 2020-09-14 VITALS — BODY MASS INDEX: 33.76 KG/M2 | WEIGHT: 156 LBS | DIASTOLIC BLOOD PRESSURE: 62 MMHG | SYSTOLIC BLOOD PRESSURE: 110 MMHG

## 2020-09-14 DIAGNOSIS — O26.843 UTERINE SIZE DATE DISCREPANCY PREGNANCY, THIRD TRIMESTER: ICD-10-CM

## 2020-09-14 DIAGNOSIS — Z34.82 ENCOUNTER FOR SUPERVISION OF OTHER NORMAL PREGNANCY IN SECOND TRIMESTER: ICD-10-CM

## 2020-09-14 PROCEDURE — 90715 TDAP VACCINE 7 YRS/> IM: CPT | Performed by: ADVANCED PRACTICE MIDWIFE

## 2020-09-14 PROCEDURE — 90040 PR PRENATAL FOLLOW UP: CPT | Performed by: ADVANCED PRACTICE MIDWIFE

## 2020-09-14 PROCEDURE — 90471 IMMUNIZATION ADMIN: CPT | Performed by: ADVANCED PRACTICE MIDWIFE

## 2020-09-14 NOTE — LETTER
"Count Your Baby's Movements  Another step to a healthy delivery    Johanny Hubbard             Dept: [phone]    How Many Weeks Pregnant? 27W6D    Date to Begin Countin/15/2020              How to use this chart    One way for your physician to keep track of your baby's health is by knowing how often the baby moves (or \"kicks\") in your womb.  You can help your physician to do this by using this chart every day.    Every day, you should see how many hours it takes for your baby to move 10 times.  Start in the morning, as soon as you get up.    · First, write down the time your baby moves until you get to 10.  · Check off one box every time your baby moves until you get to 10.  · Write down the time you finished counting in the last column.  · Total how long it took to count up all 10 movements.  · Finally, fill in the box that shows how long this took.  After counting 10 movements, you no longer have to count any more that day.  The next morning, just start counting again as soon as you get up.    What should you call a \"movement\"?  It is hard to say, because it will feel different from one mother to another and from one pregnancy to the next.  The important thing is that you count the movements the same way throughout your pregnancy.  If you have more questions, you should ask your physician.    Count carefully every day!  SAMPLE:  Week 28    How many hours did it take to feel 10 movements?       Start  Time     1     2     3     4     5     6     7     8     9     10   Finish Time   Mon 8:20 ·  ·  ·  ·  ·  ·  ·  ·  ·  ·  11:40                  Sat               Sun                 IMPORTANT: You should contact your physician if it takes more than two hours for you to feel 10 movements.  Each morning, write down the time and start to count the movements of your baby.  Keep track by checking off one box every time you feel one movement.  When you " "have felt 10 \"kicks\", write down the time you finished counting in the last column.  Then fill in the   box (over the check jacquelyn) for the number of hours it took.  Be sure to read the complete instructions on the previous page.            "

## 2020-09-14 NOTE — PROGRESS NOTES
Pt here today for OB follow up  Reports +FM  WT: 156 lb  BP: 110/62  Preferred pharmacy verified with pt.  CATHY sheet given and explained today  Pt states has been having pelvic pressure and some cramping. States no other complaints.   Pt would like to try for .   Declines BTL  Desires Tdap vaccine  3rd trimester labs not done yet. Pt states will get blood work done this week. Lab slips reprinted for pt and instructions given.   Good # [phone]    Tdap vaccine given today. Left Deltoid. VIS given and screening check list reviewed with pt.  Tdap vaccine verified by [address]
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
## 2020-09-26 ENCOUNTER — HOSPITAL ENCOUNTER (OUTPATIENT)
Dept: LAB | Facility: MEDICAL CENTER | Age: 25
End: 2020-09-26
Attending: PHYSICIAN ASSISTANT
Payer: COMMERCIAL

## 2020-09-26 DIAGNOSIS — O09.892 SUPERVISION OF OTHER HIGH RISK PREGNANCIES, SECOND TRIMESTER: ICD-10-CM

## 2020-09-26 LAB
ERYTHROCYTE [DISTWIDTH] IN BLOOD BY AUTOMATED COUNT: 44.5 FL (ref 35.9–50)
GLUCOSE 1H P 50 G GLC PO SERPL-MCNC: 166 MG/DL (ref 70–139)
HCT VFR BLD AUTO: 39.2 % (ref 37–47)
HGB BLD-MCNC: 12.9 G/DL (ref 12–16)
MCH RBC QN AUTO: 30.6 PG (ref 27–33)
MCHC RBC AUTO-ENTMCNC: 32.9 G/DL (ref 33.6–35)
MCV RBC AUTO: 92.9 FL (ref 81.4–97.8)
PLATELET # BLD AUTO: 230 K/UL (ref 164–446)
PMV BLD AUTO: 11.9 FL (ref 9–12.9)
RBC # BLD AUTO: 4.22 M/UL (ref 4.2–5.4)
TREPONEMA PALLIDUM IGG+IGM AB [PRESENCE] IN SERUM OR PLASMA BY IMMUNOASSAY: NORMAL
WBC # BLD AUTO: 10.3 K/UL (ref 4.8–10.8)

## 2020-09-26 PROCEDURE — 85027 COMPLETE CBC AUTOMATED: CPT

## 2020-09-26 PROCEDURE — 86780 TREPONEMA PALLIDUM: CPT

## 2020-09-26 PROCEDURE — 36415 COLL VENOUS BLD VENIPUNCTURE: CPT

## 2020-09-26 PROCEDURE — 82950 GLUCOSE TEST: CPT

## 2020-09-28 ENCOUNTER — ROUTINE PRENATAL (OUTPATIENT)
Dept: OBGYN | Facility: CLINIC | Age: 25
End: 2020-09-28
Payer: MEDICAID

## 2020-09-28 VITALS — SYSTOLIC BLOOD PRESSURE: 120 MMHG | BODY MASS INDEX: 34.73 KG/M2 | DIASTOLIC BLOOD PRESSURE: 68 MMHG | WEIGHT: 160.5 LBS

## 2020-09-28 DIAGNOSIS — O34.219 HISTORY OF CESAREAN DELIVERY, CURRENTLY PREGNANT: ICD-10-CM

## 2020-09-28 DIAGNOSIS — O40.3XX0 POLYHYDRAMNIOS IN THIRD TRIMESTER COMPLICATION, SINGLE OR UNSPECIFIED FETUS: ICD-10-CM

## 2020-09-28 DIAGNOSIS — O09.892 SUPERVISION OF OTHER HIGH RISK PREGNANCIES, SECOND TRIMESTER: ICD-10-CM

## 2020-09-28 DIAGNOSIS — R73.09 ABNORMAL GTT (GLUCOSE TOLERANCE TEST): Primary | ICD-10-CM

## 2020-09-28 PROCEDURE — 90686 IIV4 VACC NO PRSV 0.5 ML IM: CPT | Performed by: OBSTETRICS & GYNECOLOGY

## 2020-09-28 PROCEDURE — 90040 PR PRENATAL FOLLOW UP: CPT | Performed by: OBSTETRICS & GYNECOLOGY

## 2020-09-28 PROCEDURE — 90471 IMMUNIZATION ADMIN: CPT | Performed by: OBSTETRICS & GYNECOLOGY

## 2020-09-28 NOTE — PROGRESS NOTES
Pt. Here for OB/FU. Reports Good FM.   Good # [phone]  Pt. States having tightening of her stomach and nausea.   Pharmacy verified.  Flu vaccine given today, right deltoid. Screening checklist reviewed with pt. Verified by Linda KAUFMAN  Pt notified of abnormal 1 HR GTT and notified of 3 HR GTT
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
## 2020-10-10 ENCOUNTER — HOSPITAL ENCOUNTER (OUTPATIENT)
Dept: LAB | Facility: MEDICAL CENTER | Age: 25
End: 2020-10-10
Attending: NURSE PRACTITIONER
Payer: COMMERCIAL

## 2020-10-10 DIAGNOSIS — R73.09 ABNORMAL GTT (GLUCOSE TOLERANCE TEST): ICD-10-CM

## 2020-10-10 LAB
GLUCOSE 1H P CHAL SERPL-MCNC: 171 MG/DL (ref 65–180)
GLUCOSE 2H P CHAL SERPL-MCNC: 185 MG/DL (ref 65–155)
GLUCOSE 3H P CHAL SERPL-MCNC: 161 MG/DL (ref 65–140)
GLUCOSE BS SERPL-MCNC: 84 MG/DL (ref 65–95)

## 2020-10-10 PROCEDURE — 82951 GLUCOSE TOLERANCE TEST (GTT): CPT

## 2020-10-10 PROCEDURE — 82952 GTT-ADDED SAMPLES: CPT

## 2020-10-10 PROCEDURE — 36415 COLL VENOUS BLD VENIPUNCTURE: CPT

## 2020-10-12 DIAGNOSIS — O24.419 GESTATIONAL DIABETES MELLITUS (GDM) IN THIRD TRIMESTER, GESTATIONAL DIABETES METHOD OF CONTROL UNSPECIFIED: Primary | ICD-10-CM

## 2020-10-12 RX ORDER — LANCETS 30 GAUGE
EACH MISCELLANEOUS
Qty: 100 EACH | Refills: 0 | Status: SHIPPED | OUTPATIENT
Start: 2020-10-12 | End: 2023-01-17

## 2020-10-12 RX ORDER — GLUCOSAMINE HCL/CHONDROITIN SU 500-400 MG
CAPSULE ORAL
Qty: 100 EACH | Refills: 0 | Status: SHIPPED | OUTPATIENT
Start: 2020-10-12 | End: 2021-01-01

## 2020-10-13 ENCOUNTER — ROUTINE PRENATAL (OUTPATIENT)
Dept: OBGYN | Facility: CLINIC | Age: 25
End: 2020-10-13
Payer: MEDICAID

## 2020-10-13 ENCOUNTER — APPOINTMENT (OUTPATIENT)
Dept: RADIOLOGY | Facility: IMAGING CENTER | Age: 25
End: 2020-10-13
Attending: OBSTETRICS & GYNECOLOGY
Payer: COMMERCIAL

## 2020-10-13 ENCOUNTER — TELEPHONE (OUTPATIENT)
Dept: OBGYN | Facility: CLINIC | Age: 25
End: 2020-10-13

## 2020-10-13 VITALS — SYSTOLIC BLOOD PRESSURE: 120 MMHG | DIASTOLIC BLOOD PRESSURE: 60 MMHG | BODY MASS INDEX: 35.27 KG/M2 | WEIGHT: 163 LBS

## 2020-10-13 DIAGNOSIS — O40.3XX0 POLYHYDRAMNIOS IN THIRD TRIMESTER COMPLICATION, SINGLE OR UNSPECIFIED FETUS: ICD-10-CM

## 2020-10-13 DIAGNOSIS — O24.410 DIET CONTROLLED GESTATIONAL DIABETES MELLITUS (GDM) IN THIRD TRIMESTER: ICD-10-CM

## 2020-10-13 DIAGNOSIS — O28.3 ABNORMAL FETAL ULTRASOUND: ICD-10-CM

## 2020-10-13 DIAGNOSIS — O09.893 SUPERVISION OF OTHER HIGH RISK PREGNANCIES, THIRD TRIMESTER: Primary | ICD-10-CM

## 2020-10-13 PROBLEM — O40.9XX0 POLYHYDRAMNIOS AFFECTING PREGNANCY: Status: ACTIVE | Noted: 2020-10-13

## 2020-10-13 PROBLEM — O24.419 GDM (GESTATIONAL DIABETES MELLITUS): Status: ACTIVE | Noted: 2020-10-13

## 2020-10-13 LAB
NST ACOUSTIC STIMULATION: NO
NST ACTION NECESSARY: NO
NST ASSESSMENT: NORMAL
NST BASELINE: 145
NST INDICATIONS: NORMAL
NST OTHER DATA: NORMAL
NST READ BY: NORMAL
NST RETURN: NORMAL
NST UTERINE ACTIVITY: NO

## 2020-10-13 PROCEDURE — 76816 OB US FOLLOW-UP PER FETUS: CPT | Mod: TC | Performed by: OBSTETRICS & GYNECOLOGY

## 2020-10-13 PROCEDURE — 59025 FETAL NON-STRESS TEST: CPT | Performed by: NURSE PRACTITIONER

## 2020-10-13 PROCEDURE — 90040 PR PRENATAL FOLLOW UP: CPT | Performed by: NURSE PRACTITIONER
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
## 2020-10-13 NOTE — TELEPHONE ENCOUNTER
----- Message from JUDITH Stringer sent at 10/12/2020  9:14 AM PDT -----  Pt has GDM - please schedule diabetic class, have pt complete HgA1c and supplies sent to pharmacy.   Pt will need to f/u w MDs.    10/13/2020 1221 Left message for pt to call back.   10/14/2020 pt saw provider yesterday and was informed of new GDM dx and pt had GDM class today. Pt scheduled to see MD for f/u GDM on 10/22/2020.

## 2020-10-13 NOTE — PROGRESS NOTES
Pt here today for OB follow up  Reports +FM  WT: 163 lb  BP: 120/60  Preferred pharmacy verified with pt.  Pt informed she did not passed her 3 hr gtt, aware she is now GDM and needs to complete a diabetic nutrition class. Pt states someone called her to notify her and has an appt tomorrow 10/14/20 with .with nutritionist.   Pt states has been having crams and pelvic pressure since Saturday. Pt also reports having clear vaginal discharge, denies itching or odor.   Good # [phone]

## 2020-10-14 ENCOUNTER — NON-PROVIDER VISIT (OUTPATIENT)
Dept: HEALTH INFORMATION MANAGEMENT | Facility: MEDICAL CENTER | Age: 25
End: 2020-10-14
Payer: MEDICAID

## 2020-10-14 VITALS — WEIGHT: 161.9 LBS | BODY MASS INDEX: 34.93 KG/M2 | HEIGHT: 57 IN

## 2020-10-14 DIAGNOSIS — O24.419 GESTATIONAL DIABETES MELLITUS (GDM) IN THIRD TRIMESTER, GESTATIONAL DIABETES METHOD OF CONTROL UNSPECIFIED: ICD-10-CM

## 2020-10-14 PROCEDURE — G0109 DIAB MANAGE TRN IND/GROUP: HCPCS | Performed by: INTERNAL MEDICINE
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
## 2020-10-14 NOTE — LETTER
October 14, 2020                   Re: Johanny Hubbard     1995         [id number]       JUDITH Cobb  [address] [address]      Dear :Helne Perez A.P.R.N.    On 10/14/2020, your patient Johanny Hubbard, received 1 hours of nutrition training from the Diabetes Center at [facility] for management of her gestational diabetes.  Her EDC is Estimated Date of Delivery: 12/8/20.  We taught the following subjects:    Patient was provided with a 1600 calorie meal plan with 3 meals and 3 snacks.  Importance of meal planning in diabetes management during pregnancy  Importance of consistent timing of meals and snacks and agreed upon times  Avoidance of simple carbohydrates  Metabolism of food components relating to pregnancy  Identification of foods in food groups  Patient demonstrates adequate ability to utilize meal planning manual for reference  Plan 3 meals and 3 snacks with 90% accuracy  Review basic principles of eating out  Reviewed precautions with artificial sweeteners    Comments:  Johanny agreed to follow the meal plan and to eat at the times agreed upon. She will check blood glucose 4 times a day and record the values in her log book.      Hopefully this will help in your management of her care.  If we can be of further assistance, please feel free to call.    Thank you for the referral.    Sincerely,    GDM CLASS  Vivian Bullard RD,LD  [facility]  [phone]
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
## 2020-10-14 NOTE — PROGRESS NOTES
The pt received a 1600 calorie meal plan (based on 23 kcal/kg of current weight) consisting of 3 meals and 3 snacks (see media for copy of meal plan).   Pt's prepregnancy weight was: 133lb. She has gained 18.9lb so far in this pregnancy.   Recommended meal times:   B: 7  S: 9  L: 11:30  S: 3  D: 6  S: 9   Pt was educated on carbohydrate containing foods vs non carbohydrate containing foods, the importance of small frequent meals, limiting carbohydrate to 1 serving in the morning, no fruit before noon/12pm, and avoiding all concentrated sweets for the remainder of her pregnancy. Explained the importance of not going >4hrs btwn eating during the day and no longer than 10hours overnight. Patient agrees to follow the meal plan and guidelines provided.

## 2020-10-16 ENCOUNTER — TELEPHONE (OUTPATIENT)
Dept: OBGYN | Facility: CLINIC | Age: 25
End: 2020-10-16

## 2020-10-16 NOTE — TELEPHONE ENCOUNTER
Patient called in wanting to speak with Veronica. Got on the line with Veronica, who then took over the call. No further questions.

## 2020-10-20 ENCOUNTER — HOSPITAL ENCOUNTER (OUTPATIENT)
Dept: LAB | Facility: MEDICAL CENTER | Age: 25
End: 2020-10-20
Attending: NURSE PRACTITIONER
Payer: COMMERCIAL

## 2020-10-20 LAB
EST. AVERAGE GLUCOSE BLD GHB EST-MCNC: 111 MG/DL
HBA1C MFR BLD: 5.5 % (ref 0–5.6)

## 2020-10-20 PROCEDURE — 36415 COLL VENOUS BLD VENIPUNCTURE: CPT

## 2020-10-20 PROCEDURE — 83036 HEMOGLOBIN GLYCOSYLATED A1C: CPT
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
## 2020-10-22 ENCOUNTER — NON-PROVIDER VISIT (OUTPATIENT)
Dept: OBGYN | Facility: CLINIC | Age: 25
End: 2020-10-22
Payer: COMMERCIAL

## 2020-10-22 ENCOUNTER — ROUTINE PRENATAL (OUTPATIENT)
Dept: OBGYN | Facility: CLINIC | Age: 25
End: 2020-10-22
Payer: COMMERCIAL

## 2020-10-22 VITALS — DIASTOLIC BLOOD PRESSURE: 62 MMHG | WEIGHT: 163 LBS | SYSTOLIC BLOOD PRESSURE: 116 MMHG | BODY MASS INDEX: 35.27 KG/M2

## 2020-10-22 DIAGNOSIS — O24.419 GESTATIONAL DIABETES MELLITUS (GDM), ANTEPARTUM, GESTATIONAL DIABETES METHOD OF CONTROL UNSPECIFIED: ICD-10-CM

## 2020-10-22 DIAGNOSIS — O24.410 DIET CONTROLLED GESTATIONAL DIABETES MELLITUS (GDM) IN THIRD TRIMESTER: ICD-10-CM

## 2020-10-22 PROCEDURE — 90040 PR PRENATAL FOLLOW UP: CPT | Performed by: OBSTETRICS & GYNECOLOGY

## 2020-10-22 PROCEDURE — 97802 MEDICAL NUTRITION INDIV IN: CPT | Performed by: DIETITIAN, REGISTERED

## 2020-10-22 NOTE — NON-PROVIDER
OB follow up   + fetal movement.  No VB, LOF or UC's.  Flu vaccine offered  Phone # [phone]  Preferred pharmacy confirmed.  C/o some cramping in the lower abdomen, a lot of hip pain. having some clear mucus discharge. Feet and hand swelling  Patient states that she didn't bring her BS log with her today.

## 2020-10-22 NOTE — PROGRESS NOTES
"Initial Nutrition Assessment   Referring Provider: JUDITH Stringer  Time: 10:16-10:25 am     Subjective:  -following the meal plan and timing, using the sample menu for ideas of what to have   -no issues with sweets \"no kids in the house\"   -did not bring glucose book, states all sugars have been under limits with fastings are around 80s and the postprandials are usually 120s.     Diet hx:   B- avocado, 1 toast, egg  S- cheese stick or carrots  L- sandwich with 2 slices of bread, avocado, spinach, eggs or ham  S- 1/2 banana or 1 small apple  D- omelette   S- 1 slice of bread   Beverages: water only     Objective:   Anthropometrics:  Today's weight: 163lb  Pre-pregnancy weight: 133lb  Total weight gain: 30lb  Weeks gestation: 33w2d    Biochemical data, medical test and procedures:  Lab Results   Component Value Date/Time    HBA1C 5.5 10/20/2020 01:23 PM   No POC glucose today     OGTT Results: 1hr = 171, 2hr = 185, 3hr = 161     Glucose Log Book (most recent):    Fasting glucose range: n/a no log book   1 hour glucose post prandial range: n/a no log book     Assessment:   Nutrition Diagnosis (PES Statement):  · Altered nutrition related lab values related to endocrine dysfunction as evidenced by OGTT    Recommended Diet:  1600 calorie meal plan (see media for detailed meal plan)  3 meals and 3 snacks   1 carb choice at breakfast w/ 2oz protein and 1 fat   No concentrated sweets for remainder of pregnancy   No fruit in the morning   No sweetened beverages  No alcohol in pregnancy   Do not go exceed 4 hours during day or >10 hours overnight without eating     Assessment Notes:   Emphasized importance of bringing her logbook to every visit as discussed in her GDM class. Obtained diet recall and provided pt with suggestions > carb + protein at snacks (some w/ just a carb and others just protein), better balanced dinner, and reviewed foods that are fats & don't raise blood glucose.      Plan: Monitoring & " Evaluation  Goals:  1. Follow meal plan as outlined above; 3 meals and 3 snacks daily.   2. Check FSBS 4 times a day (fasting and 1 hour after each meal)  3. FSBS Goals= Fasting <90; 1 hour PP <130   4. Bring blood sugar log book to each appointment   5. Appropriate weight gain during pregnancy       Follow up 1-3 weeks   Trinh Bullard RD, LD  [phone]

## 2020-10-29 ENCOUNTER — ROUTINE PRENATAL (OUTPATIENT)
Dept: OBGYN | Facility: CLINIC | Age: 25
End: 2020-10-29
Payer: MEDICAID

## 2020-10-29 VITALS — DIASTOLIC BLOOD PRESSURE: 72 MMHG | WEIGHT: 167 LBS | SYSTOLIC BLOOD PRESSURE: 133 MMHG | BODY MASS INDEX: 36.14 KG/M2

## 2020-10-29 DIAGNOSIS — O40.9XX0 POLYHYDRAMNIOS AFFECTING PREGNANCY: ICD-10-CM

## 2020-10-29 DIAGNOSIS — O26.893 PREGNANCY RELATED HIP PAIN IN THIRD TRIMESTER, ANTEPARTUM: ICD-10-CM

## 2020-10-29 DIAGNOSIS — M25.559 PREGNANCY RELATED HIP PAIN IN THIRD TRIMESTER, ANTEPARTUM: ICD-10-CM

## 2020-10-29 DIAGNOSIS — O34.219 HISTORY OF CESAREAN DELIVERY, CURRENTLY PREGNANT: ICD-10-CM
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
## 2020-10-29 LAB
NST ACOUSTIC STIMULATION: NORMAL
NST ACTION NECESSARY: NORMAL
NST ASSESSMENT: NORMAL
NST BASELINE: NORMAL
NST INDICATIONS: NORMAL
NST OTHER DATA: NORMAL
NST READ BY: NORMAL
NST RETURN: NORMAL
NST UTERINE ACTIVITY: NORMAL

## 2020-10-29 PROCEDURE — 59025 FETAL NON-STRESS TEST: CPT | Performed by: OBSTETRICS & GYNECOLOGY

## 2020-10-29 PROCEDURE — 90040 PR PRENATAL FOLLOW UP: CPT | Performed by: OBSTETRICS & GYNECOLOGY

## 2020-10-29 NOTE — PROCEDURES
NST: Baseline 140 with moderate variability, accelerations present, no decelerations noted.  Anibal irregularly.  3 contractions on monitor that patient can feel.  Indication: Polyhydramnios and diet-controlled gestational diabetes.  Discussed that she can have NSTs once weekly.
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
## 2020-10-29 NOTE — PROGRESS NOTES
Pt here today for OB follow up  Pt states she is having contactions every three hours, but unsure of how many in a single hour. States they last 30-50 seconds.    Reports +  Good # [phone]  Pharmacy Confirmed.  Chaperone offered and not indicated.   Diabetic supplies: None needed today.   Pt is scheduled on 12/2/2020 for c/s at 9:30 am. Pt given instructions.

## 2020-11-05 ENCOUNTER — ROUTINE PRENATAL (OUTPATIENT)
Dept: OBGYN | Facility: CLINIC | Age: 25
End: 2020-11-05
Payer: MEDICAID

## 2020-11-05 VITALS — DIASTOLIC BLOOD PRESSURE: 80 MMHG | BODY MASS INDEX: 36.66 KG/M2 | WEIGHT: 169.4 LBS | SYSTOLIC BLOOD PRESSURE: 108 MMHG

## 2020-11-05 DIAGNOSIS — O36.8130 DECREASED FETAL MOVEMENTS IN THIRD TRIMESTER, SINGLE OR UNSPECIFIED FETUS: ICD-10-CM

## 2020-11-05 DIAGNOSIS — O24.419 GESTATIONAL DIABETES MELLITUS (GDM), ANTEPARTUM, GESTATIONAL DIABETES METHOD OF CONTROL UNSPECIFIED: ICD-10-CM

## 2020-11-05 DIAGNOSIS — O40.9XX0 POLYHYDRAMNIOS AFFECTING PREGNANCY: ICD-10-CM

## 2020-11-05 LAB
NST ACOUSTIC STIMULATION: NORMAL
NST ACTION NECESSARY: NORMAL
NST ASSESSMENT: REACTIVE
NST BASELINE: 140
NST INDICATIONS: NORMAL
NST OTHER DATA: NORMAL
NST READ BY: NORMAL
NST RETURN: NORMAL
NST UTERINE ACTIVITY: NORMAL

## 2020-11-05 PROCEDURE — 90040 PR PRENATAL FOLLOW UP: CPT | Performed by: OBSTETRICS & GYNECOLOGY

## 2020-11-05 PROCEDURE — 59025 FETAL NON-STRESS TEST: CPT | Performed by: OBSTETRICS & GYNECOLOGY

## 2020-11-05 PROCEDURE — 76815 OB US LIMITED FETUS(S): CPT | Performed by: OBSTETRICS & GYNECOLOGY
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
## 2020-11-05 NOTE — PROGRESS NOTES
GDM Class A1  + fetal movement  Denies any VB, LOF, irregular contractions   [phone] (home)   Pharmacy confirmed  Diabetic supplies No need

## 2020-11-12 ENCOUNTER — HOSPITAL ENCOUNTER (OUTPATIENT)
Facility: MEDICAL CENTER | Age: 25
End: 2020-11-12
Attending: OBSTETRICS & GYNECOLOGY
Payer: COMMERCIAL

## 2020-11-12 ENCOUNTER — ROUTINE PRENATAL (OUTPATIENT)
Dept: OBGYN | Facility: CLINIC | Age: 25
End: 2020-11-12
Payer: COMMERCIAL

## 2020-11-12 VITALS — WEIGHT: 169.2 LBS | SYSTOLIC BLOOD PRESSURE: 116 MMHG | DIASTOLIC BLOOD PRESSURE: 84 MMHG | BODY MASS INDEX: 36.61 KG/M2

## 2020-11-12 DIAGNOSIS — O36.8130 DECREASED FETAL MOVEMENTS IN THIRD TRIMESTER, SINGLE OR UNSPECIFIED FETUS: ICD-10-CM

## 2020-11-12 DIAGNOSIS — O24.419 GESTATIONAL DIABETES MELLITUS (GDM), ANTEPARTUM, GESTATIONAL DIABETES METHOD OF CONTROL UNSPECIFIED: ICD-10-CM

## 2020-11-12 LAB
GLUCOSE BLD-MCNC: 93 MG/DL (ref 70–100)
NST ACOUSTIC STIMULATION: NORMAL
NST ACTION NECESSARY: NORMAL
NST ASSESSMENT: NORMAL
NST BASELINE: NORMAL
NST INDICATIONS: NORMAL
NST OTHER DATA: NORMAL
NST READ BY: NORMAL
NST RETURN: NORMAL
NST UTERINE ACTIVITY: NORMAL

## 2020-11-12 PROCEDURE — 82962 GLUCOSE BLOOD TEST: CPT | Performed by: OBSTETRICS & GYNECOLOGY

## 2020-11-12 PROCEDURE — 87150 DNA/RNA AMPLIFIED PROBE: CPT

## 2020-11-12 PROCEDURE — 87081 CULTURE SCREEN ONLY: CPT

## 2020-11-12 PROCEDURE — 90040 PR PRENATAL FOLLOW UP: CPT | Performed by: OBSTETRICS & GYNECOLOGY

## 2020-11-12 NOTE — PROGRESS NOTES
GDM Class A1  Pt states that since yesterday she is only getting 10 movements in 3-4hours   Denies any VB, LO   Pt states that she is having enough clear discharge to have to change her underwear twice a day  No papers today  [phone] (home)   Pharmacy confirmed  Diabetic supplies

## 2020-11-13 DIAGNOSIS — O24.419 GESTATIONAL DIABETES MELLITUS (GDM), ANTEPARTUM, GESTATIONAL DIABETES METHOD OF CONTROL UNSPECIFIED: ICD-10-CM
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
## 2020-11-14 LAB — GP B STREP DNA SPEC QL NAA+PROBE: NEGATIVE

## 2020-11-19 ENCOUNTER — ROUTINE PRENATAL (OUTPATIENT)
Dept: OBGYN | Facility: CLINIC | Age: 25
End: 2020-11-19
Payer: COMMERCIAL

## 2020-11-19 VITALS — WEIGHT: 173.9 LBS | BODY MASS INDEX: 37.63 KG/M2 | SYSTOLIC BLOOD PRESSURE: 112 MMHG | DIASTOLIC BLOOD PRESSURE: 66 MMHG

## 2020-11-19 DIAGNOSIS — O09.892 SUPERVISION OF OTHER HIGH RISK PREGNANCIES, SECOND TRIMESTER: ICD-10-CM

## 2020-11-19 DIAGNOSIS — O40.9XX0 POLYHYDRAMNIOS AFFECTING PREGNANCY: ICD-10-CM

## 2020-11-19 DIAGNOSIS — O24.410 DIET CONTROLLED GESTATIONAL DIABETES MELLITUS (GDM) IN THIRD TRIMESTER: ICD-10-CM

## 2020-11-19 PROCEDURE — 90040 PR PRENATAL FOLLOW UP: CPT | Performed by: OBSTETRICS & GYNECOLOGY
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
## 2020-11-19 NOTE — PROGRESS NOTES
Pt. Here for OB/FU. Reports Good FM.  GDM A1   Good # [phone]  Pt. Denies VB, LOF, or UC's.   Pharmacy verified.   Chaperone offered and not indicated  Pt states that she has been having some contraction that are not consistent  GBS Negative  Pt last ate: 7am  Good on diabetic supplies

## 2020-11-25 ENCOUNTER — PRE-ADMISSION TESTING (OUTPATIENT)
Dept: ADMISSIONS | Facility: MEDICAL CENTER | Age: 25
End: 2020-11-25
Attending: OBSTETRICS & GYNECOLOGY
Payer: MEDICAID

## 2020-11-29 ENCOUNTER — HOSPITAL ENCOUNTER (OUTPATIENT)
Dept: OBGYN | Facility: MEDICAL CENTER | Age: 25
End: 2020-11-29
Attending: OBSTETRICS & GYNECOLOGY
Payer: MEDICAID

## 2020-11-29 LAB
COVID ORDER STATUS COVID19: NORMAL
SARS-COV-2 RNA RESP QL NAA+PROBE: NOTDETECTED
SPECIMEN SOURCE: NORMAL

## 2020-11-29 PROCEDURE — U0003 INFECTIOUS AGENT DETECTION BY NUCLEIC ACID (DNA OR RNA); SEVERE ACUTE RESPIRATORY SYNDROME CORONAVIRUS 2 (SARS-COV-2) (CORONAVIRUS DISEASE [COVID-19]), AMPLIFIED PROBE TECHNIQUE, MAKING USE OF HIGH THROUGHPUT TECHNOLOGIES AS DESCRIBED BY CMS-2020-01-R: HCPCS

## 2020-11-29 PROCEDURE — C9803 HOPD COVID-19 SPEC COLLECT: HCPCS | Performed by: OBSTETRICS & GYNECOLOGY

## 2020-12-02 ENCOUNTER — HOSPITAL ENCOUNTER (INPATIENT)
Facility: MEDICAL CENTER | Age: 25
LOS: 3 days | End: 2020-12-05
Attending: OBSTETRICS & GYNECOLOGY | Admitting: OBSTETRICS & GYNECOLOGY
Payer: MEDICAID

## 2020-12-02 ENCOUNTER — ANESTHESIA (OUTPATIENT)
Dept: OBGYN | Facility: MEDICAL CENTER | Age: 25
End: 2020-12-02
Payer: MEDICAID

## 2020-12-02 ENCOUNTER — ANESTHESIA EVENT (OUTPATIENT)
Dept: OBGYN | Facility: MEDICAL CENTER | Age: 25
End: 2020-12-02
Payer: MEDICAID

## 2020-12-02 DIAGNOSIS — Z98.891 S/P CESAREAN SECTION: ICD-10-CM

## 2020-12-02 LAB
BASOPHILS # BLD AUTO: 0.5 % (ref 0–1.8)
BASOPHILS # BLD: 0.04 K/UL (ref 0–0.12)
EOSINOPHIL # BLD AUTO: 0.03 K/UL (ref 0–0.51)
EOSINOPHIL NFR BLD: 0.4 % (ref 0–6.9)
ERYTHROCYTE [DISTWIDTH] IN BLOOD BY AUTOMATED COUNT: 41.7 FL (ref 35.9–50)
ERYTHROCYTE [DISTWIDTH] IN BLOOD BY AUTOMATED COUNT: 43.3 FL (ref 35.9–50)
HCT VFR BLD AUTO: 31 % (ref 37–47)
HCT VFR BLD AUTO: 39.8 % (ref 37–47)
HGB BLD-MCNC: 10.1 G/DL (ref 12–16)
HGB BLD-MCNC: 12.6 G/DL (ref 12–16)
HOLDING TUBE BB 8507: NORMAL
IMM GRANULOCYTES # BLD AUTO: 0.04 K/UL (ref 0–0.11)
IMM GRANULOCYTES NFR BLD AUTO: 0.5 % (ref 0–0.9)
LYMPHOCYTES # BLD AUTO: 2 K/UL (ref 1–4.8)
LYMPHOCYTES NFR BLD: 25.2 % (ref 22–41)
MCH RBC QN AUTO: 28.6 PG (ref 27–33)
MCH RBC QN AUTO: 28.7 PG (ref 27–33)
MCHC RBC AUTO-ENTMCNC: 31.7 G/DL (ref 33.6–35)
MCHC RBC AUTO-ENTMCNC: 32.6 G/DL (ref 33.6–35)
MCV RBC AUTO: 87.8 FL (ref 81.4–97.8)
MCV RBC AUTO: 90.7 FL (ref 81.4–97.8)
MONOCYTES # BLD AUTO: 0.72 K/UL (ref 0–0.85)
MONOCYTES NFR BLD AUTO: 9.1 % (ref 0–13.4)
NEUTROPHILS # BLD AUTO: 5.1 K/UL (ref 2–7.15)
NEUTROPHILS NFR BLD: 64.3 % (ref 44–72)
NRBC # BLD AUTO: 0 K/UL
NRBC BLD-RTO: 0 /100 WBC
PLATELET # BLD AUTO: 170 K/UL (ref 164–446)
PLATELET # BLD AUTO: 184 K/UL (ref 164–446)
PMV BLD AUTO: 12.2 FL (ref 9–12.9)
PMV BLD AUTO: 12.4 FL (ref 9–12.9)
RBC # BLD AUTO: 3.53 M/UL (ref 4.2–5.4)
RBC # BLD AUTO: 4.39 M/UL (ref 4.2–5.4)
WBC # BLD AUTO: 7.9 K/UL (ref 4.8–10.8)
WBC # BLD AUTO: 9.6 K/UL (ref 4.8–10.8)

## 2020-12-02 PROCEDURE — 160035 HCHG PACU - 1ST 60 MINS PHASE I: Performed by: OBSTETRICS & GYNECOLOGY

## 2020-12-02 PROCEDURE — 700111 HCHG RX REV CODE 636 W/ 250 OVERRIDE (IP): Performed by: ANESTHESIOLOGY

## 2020-12-02 PROCEDURE — 700105 HCHG RX REV CODE 258: Performed by: ANESTHESIOLOGY

## 2020-12-02 PROCEDURE — 770002 HCHG ROOM/CARE - OB PRIVATE (112)

## 2020-12-02 PROCEDURE — 59514 CESAREAN DELIVERY ONLY: CPT | Mod: 80

## 2020-12-02 PROCEDURE — 85027 COMPLETE CBC AUTOMATED: CPT

## 2020-12-02 PROCEDURE — 85025 COMPLETE CBC W/AUTO DIFF WBC: CPT

## 2020-12-02 PROCEDURE — 700105 HCHG RX REV CODE 258: Performed by: OBSTETRICS & GYNECOLOGY

## 2020-12-02 PROCEDURE — 36415 COLL VENOUS BLD VENIPUNCTURE: CPT

## 2020-12-02 PROCEDURE — 700111 HCHG RX REV CODE 636 W/ 250 OVERRIDE (IP): Performed by: OBSTETRICS & GYNECOLOGY

## 2020-12-02 PROCEDURE — 160029 HCHG SURGERY MINUTES - 1ST 30 MINS LEVEL 4: Performed by: OBSTETRICS & GYNECOLOGY

## 2020-12-02 PROCEDURE — 700101 HCHG RX REV CODE 250: Performed by: ANESTHESIOLOGY

## 2020-12-02 PROCEDURE — 160048 HCHG OR STATISTICAL LEVEL 1-5: Performed by: OBSTETRICS & GYNECOLOGY

## 2020-12-02 PROCEDURE — 160002 HCHG RECOVERY MINUTES (STAT): Performed by: OBSTETRICS & GYNECOLOGY

## 2020-12-02 PROCEDURE — A9270 NON-COVERED ITEM OR SERVICE: HCPCS | Performed by: ANESTHESIOLOGY

## 2020-12-02 PROCEDURE — 160009 HCHG ANES TIME/MIN: Performed by: OBSTETRICS & GYNECOLOGY

## 2020-12-02 PROCEDURE — 59025 FETAL NON-STRESS TEST: CPT

## 2020-12-02 PROCEDURE — 59514 CESAREAN DELIVERY ONLY: CPT | Performed by: OBSTETRICS & GYNECOLOGY

## 2020-12-02 PROCEDURE — 94667 MNPJ CHEST WALL 1ST: CPT

## 2020-12-02 PROCEDURE — 700102 HCHG RX REV CODE 250 W/ 637 OVERRIDE(OP): Performed by: ANESTHESIOLOGY

## 2020-12-02 PROCEDURE — 160041 HCHG SURGERY MINUTES - EA ADDL 1 MIN LEVEL 4: Performed by: OBSTETRICS & GYNECOLOGY

## 2020-12-02 RX ORDER — ONDANSETRON 2 MG/ML
4 INJECTION INTRAMUSCULAR; INTRAVENOUS EVERY 6 HOURS PRN
Status: DISCONTINUED | OUTPATIENT
Start: 2020-12-02 | End: 2020-12-05 | Stop reason: HOSPADM

## 2020-12-02 RX ORDER — SODIUM CHLORIDE, SODIUM GLUCONATE, SODIUM ACETATE, POTASSIUM CHLORIDE AND MAGNESIUM CHLORIDE 526; 502; 368; 37; 30 MG/100ML; MG/100ML; MG/100ML; MG/100ML; MG/100ML
INJECTION, SOLUTION INTRAVENOUS
Status: DISCONTINUED | OUTPATIENT
Start: 2020-12-02 | End: 2020-12-02 | Stop reason: SURG

## 2020-12-02 RX ORDER — HYDROMORPHONE HYDROCHLORIDE 1 MG/ML
0.4 INJECTION, SOLUTION INTRAMUSCULAR; INTRAVENOUS; SUBCUTANEOUS
Status: DISCONTINUED | OUTPATIENT
Start: 2020-12-02 | End: 2020-12-02 | Stop reason: HOSPADM

## 2020-12-02 RX ORDER — ACETAMINOPHEN 500 MG
1000 TABLET ORAL EVERY 6 HOURS
Status: COMPLETED | OUTPATIENT
Start: 2020-12-02 | End: 2020-12-03

## 2020-12-02 RX ORDER — ONDANSETRON 4 MG/1
4 TABLET, ORALLY DISINTEGRATING ORAL EVERY 6 HOURS PRN
Status: DISCONTINUED | OUTPATIENT
Start: 2020-12-02 | End: 2020-12-05 | Stop reason: HOSPADM

## 2020-12-02 RX ORDER — DIPHENHYDRAMINE HYDROCHLORIDE 50 MG/ML
25 INJECTION INTRAMUSCULAR; INTRAVENOUS EVERY 6 HOURS PRN
Status: DISCONTINUED | OUTPATIENT
Start: 2020-12-02 | End: 2020-12-05 | Stop reason: HOSPADM

## 2020-12-02 RX ORDER — MEPERIDINE HYDROCHLORIDE 25 MG/ML
6.25 INJECTION INTRAMUSCULAR; INTRAVENOUS; SUBCUTANEOUS
Status: DISCONTINUED | OUTPATIENT
Start: 2020-12-02 | End: 2020-12-02 | Stop reason: HOSPADM

## 2020-12-02 RX ORDER — DIPHENHYDRAMINE HYDROCHLORIDE 50 MG/ML
12.5 INJECTION INTRAMUSCULAR; INTRAVENOUS EVERY 6 HOURS PRN
Status: ACTIVE | OUTPATIENT
Start: 2020-12-02 | End: 2020-12-03

## 2020-12-02 RX ORDER — METOCLOPRAMIDE HYDROCHLORIDE 5 MG/ML
10 INJECTION INTRAMUSCULAR; INTRAVENOUS ONCE
Status: COMPLETED | OUTPATIENT
Start: 2020-12-02 | End: 2020-12-02

## 2020-12-02 RX ORDER — DIPHENHYDRAMINE HCL 25 MG
25 TABLET ORAL EVERY 6 HOURS PRN
Status: DISCONTINUED | OUTPATIENT
Start: 2020-12-02 | End: 2020-12-05 | Stop reason: HOSPADM

## 2020-12-02 RX ORDER — OXYCODONE HCL 5 MG/5 ML
10 SOLUTION, ORAL ORAL
Status: DISCONTINUED | OUTPATIENT
Start: 2020-12-02 | End: 2020-12-02 | Stop reason: HOSPADM

## 2020-12-02 RX ORDER — ONDANSETRON 2 MG/ML
INJECTION INTRAMUSCULAR; INTRAVENOUS PRN
Status: DISCONTINUED | OUTPATIENT
Start: 2020-12-02 | End: 2020-12-02 | Stop reason: SURG

## 2020-12-02 RX ORDER — VITAMIN A ACETATE, BETA CAROTENE, ASCORBIC ACID, CHOLECALCIFEROL, .ALPHA.-TOCOPHEROL ACETATE, DL-, THIAMINE MONONITRATE, RIBOFLAVIN, NIACINAMIDE, PYRIDOXINE HYDROCHLORIDE, FOLIC ACID, CYANOCOBALAMIN, CALCIUM CARBONATE, FERROUS FUMARATE, ZINC OXIDE, CUPRIC OXIDE 3080; 12; 120; 400; 1; 1.84; 3; 20; 22; 920; 25; 200; 27; 10; 2 [IU]/1; UG/1; MG/1; [IU]/1; MG/1; MG/1; MG/1; MG/1; MG/1; [IU]/1; MG/1; MG/1; MG/1; MG/1; MG/1
1 TABLET, FILM COATED ORAL EVERY MORNING
Status: DISCONTINUED | OUTPATIENT
Start: 2020-12-02 | End: 2020-12-05 | Stop reason: HOSPADM

## 2020-12-02 RX ORDER — KETOROLAC TROMETHAMINE 30 MG/ML
30 INJECTION, SOLUTION INTRAMUSCULAR; INTRAVENOUS EVERY 6 HOURS
Status: COMPLETED | OUTPATIENT
Start: 2020-12-02 | End: 2020-12-03

## 2020-12-02 RX ORDER — ONDANSETRON 2 MG/ML
4 INJECTION INTRAMUSCULAR; INTRAVENOUS
Status: DISCONTINUED | OUTPATIENT
Start: 2020-12-02 | End: 2020-12-02 | Stop reason: HOSPADM

## 2020-12-02 RX ORDER — SODIUM CHLORIDE, SODIUM GLUCONATE, SODIUM ACETATE, POTASSIUM CHLORIDE AND MAGNESIUM CHLORIDE 526; 502; 368; 37; 30 MG/100ML; MG/100ML; MG/100ML; MG/100ML; MG/100ML
1500 INJECTION, SOLUTION INTRAVENOUS ONCE
Status: COMPLETED | OUTPATIENT
Start: 2020-12-02 | End: 2020-12-02

## 2020-12-02 RX ORDER — PHENYLEPHRINE HCL IN 0.9% NACL 0.5 MG/5ML
SYRINGE (ML) INTRAVENOUS PRN
Status: DISCONTINUED | OUTPATIENT
Start: 2020-12-02 | End: 2020-12-02 | Stop reason: SURG

## 2020-12-02 RX ORDER — OXYTOCIN 10 [USP'U]/ML
INJECTION, SOLUTION INTRAMUSCULAR; INTRAVENOUS PRN
Status: DISCONTINUED | OUTPATIENT
Start: 2020-12-02 | End: 2020-12-02 | Stop reason: SURG

## 2020-12-02 RX ORDER — HALOPERIDOL 5 MG/ML
1 INJECTION INTRAMUSCULAR
Status: DISCONTINUED | OUTPATIENT
Start: 2020-12-02 | End: 2020-12-02 | Stop reason: HOSPADM

## 2020-12-02 RX ORDER — OXYCODONE HCL 5 MG/5 ML
5 SOLUTION, ORAL ORAL
Status: DISCONTINUED | OUTPATIENT
Start: 2020-12-02 | End: 2020-12-02 | Stop reason: HOSPADM

## 2020-12-02 RX ORDER — DIPHENHYDRAMINE HYDROCHLORIDE 50 MG/ML
12.5 INJECTION INTRAMUSCULAR; INTRAVENOUS
Status: DISCONTINUED | OUTPATIENT
Start: 2020-12-02 | End: 2020-12-02 | Stop reason: HOSPADM

## 2020-12-02 RX ORDER — MISOPROSTOL 200 UG/1
800 TABLET ORAL
Status: DISCONTINUED | OUTPATIENT
Start: 2020-12-02 | End: 2020-12-05 | Stop reason: HOSPADM

## 2020-12-02 RX ORDER — SODIUM CHLORIDE, SODIUM LACTATE, POTASSIUM CHLORIDE, CALCIUM CHLORIDE 600; 310; 30; 20 MG/100ML; MG/100ML; MG/100ML; MG/100ML
INJECTION, SOLUTION INTRAVENOUS CONTINUOUS
Status: DISCONTINUED | OUTPATIENT
Start: 2020-12-02 | End: 2020-12-05 | Stop reason: HOSPADM

## 2020-12-02 RX ORDER — DOCUSATE SODIUM 100 MG/1
100 CAPSULE, LIQUID FILLED ORAL 2 TIMES DAILY PRN
Status: DISCONTINUED | OUTPATIENT
Start: 2020-12-02 | End: 2020-12-05 | Stop reason: HOSPADM

## 2020-12-02 RX ORDER — BUPIVACAINE HYDROCHLORIDE 7.5 MG/ML
INJECTION, SOLUTION INTRASPINAL
Status: COMPLETED | OUTPATIENT
Start: 2020-12-02 | End: 2020-12-02

## 2020-12-02 RX ORDER — HYDROMORPHONE HYDROCHLORIDE 1 MG/ML
0.2 INJECTION, SOLUTION INTRAMUSCULAR; INTRAVENOUS; SUBCUTANEOUS
Status: DISCONTINUED | OUTPATIENT
Start: 2020-12-02 | End: 2020-12-02 | Stop reason: HOSPADM

## 2020-12-02 RX ORDER — KETOROLAC TROMETHAMINE 30 MG/ML
INJECTION, SOLUTION INTRAMUSCULAR; INTRAVENOUS PRN
Status: DISCONTINUED | OUTPATIENT
Start: 2020-12-02 | End: 2020-12-02 | Stop reason: SURG

## 2020-12-02 RX ORDER — SODIUM CHLORIDE, SODIUM GLUCONATE, SODIUM ACETATE, POTASSIUM CHLORIDE AND MAGNESIUM CHLORIDE 526; 502; 368; 37; 30 MG/100ML; MG/100ML; MG/100ML; MG/100ML; MG/100ML
500 INJECTION, SOLUTION INTRAVENOUS CONTINUOUS
Status: DISCONTINUED | OUTPATIENT
Start: 2020-12-02 | End: 2020-12-05 | Stop reason: HOSPADM

## 2020-12-02 RX ORDER — CEFAZOLIN SODIUM 1 G/3ML
INJECTION, POWDER, FOR SOLUTION INTRAMUSCULAR; INTRAVENOUS PRN
Status: DISCONTINUED | OUTPATIENT
Start: 2020-12-02 | End: 2020-12-02 | Stop reason: SURG

## 2020-12-02 RX ORDER — HYDROMORPHONE HYDROCHLORIDE 1 MG/ML
0.4 INJECTION, SOLUTION INTRAMUSCULAR; INTRAVENOUS; SUBCUTANEOUS
Status: ACTIVE | OUTPATIENT
Start: 2020-12-02 | End: 2020-12-03

## 2020-12-02 RX ORDER — DIPHENHYDRAMINE HYDROCHLORIDE 50 MG/ML
25 INJECTION INTRAMUSCULAR; INTRAVENOUS EVERY 6 HOURS PRN
Status: ACTIVE | OUTPATIENT
Start: 2020-12-02 | End: 2020-12-03

## 2020-12-02 RX ORDER — SODIUM CHLORIDE, SODIUM LACTATE, POTASSIUM CHLORIDE, CALCIUM CHLORIDE 600; 310; 30; 20 MG/100ML; MG/100ML; MG/100ML; MG/100ML
INJECTION, SOLUTION INTRAVENOUS PRN
Status: DISCONTINUED | OUTPATIENT
Start: 2020-12-02 | End: 2020-12-05 | Stop reason: HOSPADM

## 2020-12-02 RX ORDER — ONDANSETRON 2 MG/ML
4 INJECTION INTRAMUSCULAR; INTRAVENOUS EVERY 6 HOURS PRN
Status: ACTIVE | OUTPATIENT
Start: 2020-12-02 | End: 2020-12-03

## 2020-12-02 RX ORDER — MORPHINE SULFATE 0.5 MG/ML
INJECTION, SOLUTION EPIDURAL; INTRATHECAL; INTRAVENOUS
Status: COMPLETED | OUTPATIENT
Start: 2020-12-02 | End: 2020-12-02

## 2020-12-02 RX ORDER — HYDROMORPHONE HYDROCHLORIDE 1 MG/ML
0.6 INJECTION, SOLUTION INTRAMUSCULAR; INTRAVENOUS; SUBCUTANEOUS
Status: DISCONTINUED | OUTPATIENT
Start: 2020-12-02 | End: 2020-12-02 | Stop reason: HOSPADM

## 2020-12-02 RX ORDER — OXYCODONE HYDROCHLORIDE 10 MG/1
10 TABLET ORAL EVERY 4 HOURS PRN
Status: ACTIVE | OUTPATIENT
Start: 2020-12-02 | End: 2020-12-03

## 2020-12-02 RX ORDER — MISOPROSTOL 200 UG/1
600 TABLET ORAL
Status: DISCONTINUED | OUTPATIENT
Start: 2020-12-02 | End: 2020-12-05 | Stop reason: HOSPADM

## 2020-12-02 RX ORDER — OXYCODONE HYDROCHLORIDE 5 MG/1
5 TABLET ORAL EVERY 4 HOURS PRN
Status: ACTIVE | OUTPATIENT
Start: 2020-12-02 | End: 2020-12-03

## 2020-12-02 RX ORDER — SIMETHICONE 80 MG
80 TABLET,CHEWABLE ORAL 4 TIMES DAILY PRN
Status: DISCONTINUED | OUTPATIENT
Start: 2020-12-02 | End: 2020-12-05 | Stop reason: HOSPADM

## 2020-12-02 RX ORDER — CITRIC ACID/SODIUM CITRATE 334-500MG
30 SOLUTION, ORAL ORAL ONCE
Status: COMPLETED | OUTPATIENT
Start: 2020-12-02 | End: 2020-12-02

## 2020-12-02 RX ORDER — METOPROLOL TARTRATE 1 MG/ML
1 INJECTION, SOLUTION INTRAVENOUS
Status: DISCONTINUED | OUTPATIENT
Start: 2020-12-02 | End: 2020-12-02 | Stop reason: HOSPADM

## 2020-12-02 RX ORDER — BISACODYL 10 MG
10 SUPPOSITORY, RECTAL RECTAL PRN
Status: DISCONTINUED | OUTPATIENT
Start: 2020-12-02 | End: 2020-12-05 | Stop reason: HOSPADM

## 2020-12-02 RX ORDER — HYDROMORPHONE HYDROCHLORIDE 1 MG/ML
0.2 INJECTION, SOLUTION INTRAMUSCULAR; INTRAVENOUS; SUBCUTANEOUS
Status: ACTIVE | OUTPATIENT
Start: 2020-12-02 | End: 2020-12-03

## 2020-12-02 RX ADMIN — KETOROLAC TROMETHAMINE 30 MG: 30 INJECTION, SOLUTION INTRAMUSCULAR at 10:30

## 2020-12-02 RX ADMIN — METOCLOPRAMIDE 10 MG: 5 INJECTION, SOLUTION INTRAMUSCULAR; INTRAVENOUS at 09:12

## 2020-12-02 RX ADMIN — SODIUM CITRATE AND CITRIC ACID MONOHYDRATE 30 ML: 500; 334 SOLUTION ORAL at 09:12

## 2020-12-02 RX ADMIN — MORPHINE SULFATE 150 MCG: 0.5 INJECTION, SOLUTION EPIDURAL; INTRATHECAL; INTRAVENOUS at 09:38

## 2020-12-02 RX ADMIN — KETOROLAC TROMETHAMINE 30 MG: 30 INJECTION, SOLUTION INTRAMUSCULAR at 21:23

## 2020-12-02 RX ADMIN — FAMOTIDINE 20 MG: 10 INJECTION INTRAVENOUS at 09:12

## 2020-12-02 RX ADMIN — Medication 100 MCG: at 09:49

## 2020-12-02 RX ADMIN — FENTANYL CITRATE 10 MCG: 50 INJECTION, SOLUTION INTRAMUSCULAR; INTRAVENOUS at 09:38

## 2020-12-02 RX ADMIN — BUPIVACAINE HYDROCHLORIDE IN DEXTROSE 1.5 ML: 7.5 INJECTION, SOLUTION SUBARACHNOID at 09:38

## 2020-12-02 RX ADMIN — SODIUM CHLORIDE, POTASSIUM CHLORIDE, SODIUM LACTATE AND CALCIUM CHLORIDE: 600; 310; 30; 20 INJECTION, SOLUTION INTRAVENOUS at 21:23

## 2020-12-02 RX ADMIN — OXYTOCIN 20 UNITS: 10 INJECTION, SOLUTION INTRAMUSCULAR; INTRAVENOUS at 09:58

## 2020-12-02 RX ADMIN — ACETAMINOPHEN 1000 MG: 500 TABLET ORAL at 15:00

## 2020-12-02 RX ADMIN — Medication 2000 ML/HR: at 09:58

## 2020-12-02 RX ADMIN — ACETAMINOPHEN 1000 MG: 500 TABLET ORAL at 21:23

## 2020-12-02 RX ADMIN — EPHEDRINE SULFATE 10 MG: 50 INJECTION, SOLUTION INTRAVENOUS at 10:11

## 2020-12-02 RX ADMIN — SODIUM CHLORIDE, SODIUM GLUCONATE, SODIUM ACETATE, POTASSIUM CHLORIDE AND MAGNESIUM CHLORIDE 1500 ML: 526; 502; 368; 37; 30 INJECTION, SOLUTION INTRAVENOUS at 08:20

## 2020-12-02 RX ADMIN — CEFAZOLIN 2 G: 330 INJECTION, POWDER, FOR SOLUTION INTRAMUSCULAR; INTRAVENOUS at 09:43

## 2020-12-02 RX ADMIN — ONDANSETRON 4 MG: 2 INJECTION INTRAMUSCULAR; INTRAVENOUS at 10:31

## 2020-12-02 RX ADMIN — Medication 100 MCG: at 10:10

## 2020-12-02 RX ADMIN — SODIUM CHLORIDE, SODIUM GLUCONATE, SODIUM ACETATE, POTASSIUM CHLORIDE AND MAGNESIUM CHLORIDE: 526; 502; 368; 37; 30 INJECTION, SOLUTION INTRAVENOUS at 09:33

## 2020-12-02 RX ADMIN — KETOROLAC TROMETHAMINE 30 MG: 30 INJECTION, SOLUTION INTRAMUSCULAR at 15:01

## 2020-12-02 RX ADMIN — Medication 50 MCG: at 10:03

## 2020-12-02 SDOH — ECONOMIC STABILITY: FOOD INSECURITY: WITHIN THE PAST 12 MONTHS, YOU WORRIED THAT YOUR FOOD WOULD RUN OUT BEFORE YOU GOT MONEY TO BUY MORE.: PATIENT DECLINED

## 2020-12-02 SDOH — ECONOMIC STABILITY: TRANSPORTATION INSECURITY
IN THE PAST 12 MONTHS, HAS LACK OF TRANSPORTATION KEPT YOU FROM MEETINGS, WORK, OR FROM GETTING THINGS NEEDED FOR DAILY LIVING?: PATIENT DECLINED

## 2020-12-02 SDOH — ECONOMIC STABILITY: FOOD INSECURITY: WITHIN THE PAST 12 MONTHS, THE FOOD YOU BOUGHT JUST DIDN'T LAST AND YOU DIDN'T HAVE MONEY TO GET MORE.: PATIENT DECLINED

## 2020-12-02 SDOH — ECONOMIC STABILITY: TRANSPORTATION INSECURITY
IN THE PAST 12 MONTHS, HAS THE LACK OF TRANSPORTATION KEPT YOU FROM MEDICAL APPOINTMENTS OR FROM GETTING MEDICATIONS?: PATIENT DECLINED

## 2020-12-02 ASSESSMENT — PAIN DESCRIPTION - PAIN TYPE
TYPE: ACUTE PAIN;SURGICAL PAIN
TYPE: ACUTE PAIN;SURGICAL PAIN
TYPE: ACUTE PAIN

## 2020-12-02 ASSESSMENT — PATIENT HEALTH QUESTIONNAIRE - PHQ9
1. LITTLE INTEREST OR PLEASURE IN DOING THINGS: NOT AT ALL
2. FEELING DOWN, DEPRESSED, IRRITABLE, OR HOPELESS: NOT AT ALL
SUM OF ALL RESPONSES TO PHQ9 QUESTIONS 1 AND 2: 0

## 2020-12-02 ASSESSMENT — LIFESTYLE VARIABLES
TOTAL SCORE: 0
EVER HAD A DRINK FIRST THING IN THE MORNING TO STEADY YOUR NERVES TO GET RID OF A HANGOVER: NO
EVER FELT BAD OR GUILTY ABOUT YOUR DRINKING: NO
CONSUMPTION TOTAL: NEGATIVE
ON A TYPICAL DAY WHEN YOU DRINK ALCOHOL HOW MANY DRINKS DO YOU HAVE: 0
TOTAL SCORE: 0
HOW MANY TIMES IN THE PAST YEAR HAVE YOU HAD 5 OR MORE DRINKS IN A DAY: 0
AVERAGE NUMBER OF DAYS PER WEEK YOU HAVE A DRINK CONTAINING ALCOHOL: 0
TOTAL SCORE: 0
ALCOHOL_USE: NO
HAVE PEOPLE ANNOYED YOU BY CRITICIZING YOUR DRINKING: NO
DOES PATIENT WANT TO STOP DRINKING: NO
HAVE YOU EVER FELT YOU SHOULD CUT DOWN ON YOUR DRINKING: NO
EVER_SMOKED: NEVER

## 2020-12-02 ASSESSMENT — PAIN SCALES - GENERAL: PAIN_LEVEL: 2

## 2020-12-02 NOTE — PROGRESS NOTES
Assumed care and report received from L&D.  Oriented patient to room, call light and emergency light.  Assessment completed, fundus firm and lochia light.  POC reviewed, verbalized understanding.  Patient denied need for pain intervention at this time.    1545:  Patient reported feeling gush of blood.  2 further gushes observed with fundal massage.  Fundus now firm after massage.  No clots.  No further gushes/clots expressed with massage.  Blood loss weighed:  218ml.

## 2020-12-02 NOTE — ANESTHESIA TIME REPORT
Anesthesia Start and Stop Event Times     Date Time Event    2020 0931 Ready for Procedure     0933 Anesthesia Start     1036 Anesthesia Stop        Responsible Staff  20    Name Role Begin End    Kashif Matthews M.D. Anesth 0933 1036        Preop Diagnosis (Free Text):  Pre-op Diagnosis     PREVIOUS  SECTION, 39W1D        Preop Diagnosis (Codes):    Post op Diagnosis  Status post repeat low transverse  section      Premium Reason  Non-Premium    Comments: DARINEL

## 2020-12-02 NOTE — ANESTHESIA POSTPROCEDURE EVALUATION
Patient: Johanny Hubbard    Procedure Summary     Date: 20 Room / Location: LND OR 01 / LABOR AND DELIVERY    Anesthesia Start: 933 Anesthesia Stop:     Procedure:  SECTION, REPEAT (N/A Abdomen) Diagnosis: (same, del)    Surgeons: Katelyn Smith D.O. Responsible Provider: Kashif Matthews M.D.    Anesthesia Type: spinal ASA Status: 2          Final Anesthesia Type: spinal  Last vitals  BP   Blood Pressure: 125/80    Temp   36.5 °C (97.7 °F)    Pulse   Pulse: 71   Resp   18    SpO2          Anesthesia Post Evaluation    Patient location during evaluation: PACU  Patient participation: complete - patient participated  Level of consciousness: awake and alert  Pain score: 2    Airway patency: patent  Anesthetic complications: no  Cardiovascular status: hemodynamically stable  Respiratory status: acceptable  Hydration status: euvolemic    PONV: none           Nurse Pain Score: 1 (NPRS)
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
## 2020-12-02 NOTE — PROGRESS NOTES
" EDC -  EGA - 39.1    0714 - Pt arrived to labor and delivery for repeat c/s. Pt placed in room lda 5.  0740 - External monitors in place X2. Category I FHT at this time. VSS. Pt reports good FM. No complaints of contractions, ROM or vaginal bleeding. FOB \"Vazquez\" at bedside. POC discussed with pt and family members, all questions answered. C/S prep initiated  0933 - Pt ambulated to OR 1 in stable condition  0959 - delivery of viable female infant. 8/ APGARs  1032 - Pt tramsferred to PACU bed 3 via gurney in stable condition, FOB and infant at side.   1225 - Pt transferred to S 326 via gurney in stable condition, infant in arms. FOB at side with belongings. Report to MARK ANTHONY Shore  "
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
## 2020-12-02 NOTE — OP REPORT
DATE OF SERVICE:  20     PREOPERATIVE DIAGNOSES:  1.  Intrauterine pregnancy at 39w1d  2.  Breech fetal presentation  3.  Hist of      POSTOPERATIVE DIAGNOSES:  1.  s/p  section at 39w1d for repeat/breech presentation    PROCEDURE PERFORMED:   Low Transverse  section via Pfannenstiel skin incision     SURGEON:  Katelyn Smith DO     ASSISTANT: Dr. Cornelia MD     ANESTHESIA:  Spinal     ANESTHESIOLOGIST:  Kashif Matthews MD     SPECIMEN:  placenta donated     ESTIMATED BLOOD LOSS:  600 mL    Medications: 2g IV ancef given prior to skin incision     FINDINGS: Moderate suprafascial adhesive disease and within the pelvis. Upon hysterotomy, moderate amount of yellow fluid and an female infant in breech position delivered at 0959 with Apgars of 8/9 weighing 3400g. Placenta delivered intact with use of manual traction and fundal massage; it was normal appearing and and intact with a 3 vessel cord. Normal uterus, normal fallopian tubes and ovaries bilaterally.     COMPLICATIONS:  None.     PROCEDURE:  The patient was identified and appropriate consents were obtained and verified.  She was then taken to the operating room where spinal anesthesia was placed. See a separate note from anesthesia for further details. She was then placed in supine position with left lateral tilt and a kim catheter was inserted to drain the bladder. Sequential compression devices were placed on the bilateral lower extremities and confirmed to be in working order. She was prepped and draped in the usual sterile fashion. A surgical time-out was performed. After establishing that the anesthesia was adequate, a 10cm Pfannenstiel skin incision was made with a scalpel. The incision was carried down to the level of the fascia.  The fascia was incised transversely in the midline and extended bilaterally. The underlying rectus muscle was  from the fascia superiorly and inferiorly. The rectus muscles were   bluntly and the peritoneum identified. The peritoneal cavity was entered using blunt dissection. The bladder blade was placed and the vesicouterine space was developed with sharp dissection. The lower uterine segment was identified and uterus was incised in a transverse fashion with a scalpel.  This incision was then extended bluntly.  Amniotomy was performed and there was noted to be yellow amniotic fluid. The infant was in footling breech presentation.  The buttocks was delivered followed by the trunk of the body.  Once the scapula was seen, the arms were swept in front of the infant's chest enabling them to deliver through the hysterotomy.  The flexed head was then  delivered without any complications. Delayed cord clamping was performed, clamped twice and cut and the infant was handed off to waiting NICU attendant.  The placenta was delivered intact using fundal massage and manual traction on the remaining cord.  The uterus was exteriorized and cleared of clots and debris.  A Pitocin bolus was given per L&D protocol.  The hysterotomy was closed with a running locked suture of 0-vicryl.  The uterine incision was found to be hemostatic. The adnexa were examined with the findings as above. The posterior cul-de-sac was cleared. The uterus was returned to the abdominal cavity and each of the paracolic gutters was swept in a similar fashion with a wet laparotomy sponge.The hysterotomy was examined once more and again found to be hemostatic. The underside of the rectus fascia both superiorly and inferiorly was inspected and hemostatic. The fascia was then closed with 0-vicryl, encorporating apices. The fascial closure was palpated and found to be intact. The subcutaneous fate was then irrigated and examined for hemostasis. After hemostasis had been achieved, the subcutaneous tissue was then closed with a running layer of 2.0 catgut to minimize risk of seroma. The skin was then sutured with 4.0 vicryl in a  running, subcuticular fashion. Steri strips were applied followed by a sterile bandage. Sponge and instrument counts were correct x 2.  Patient tolerated the procedure well and went to recovery room in stable condition.    HENRY

## 2020-12-02 NOTE — ANESTHESIA PROCEDURE NOTES
Spinal Block    Date/Time: 12/2/2020 9:38 AM  Performed by: Kashif Matthews M.D.  Authorized by: Kashif Matthews M.D.     Patient Location:  OB  Start Time:  12/2/2020 9:38 AM  End Time:  12/2/2020 9:40 AM  Reason for Block: primary anesthetic    patient identified, IV checked, site marked, risks and benefits discussed, surgical consent, monitors and equipment checked, pre-op evaluation and timeout performed    Patient Position:  Sitting  Prep: ChloraPrep, patient draped and sterile technique    Monitoring:  Blood pressure, continuous pulse oximetry and heart rate  Approach:  Midline  Location:  L2-3  Injection Technique:  Single-shot  Skin infiltration:  Lidocaine  Strength:  1%  Dose:  3ml  Needle Type:  Nelly  Needle Gauge:  25 G  CSF flowing pre/post injection:  Yes  Sensory Level:  T4

## 2020-12-02 NOTE — ANESTHESIA QCDR
2019 [facility] Clinical Data Registry (for Quality Improvement)     Postoperative nausea/vomiting risk protocol (Adult = 18 yrs and Pediatric 3-17 yrs)- (430 and 463)  General inhalation anesthetic (NOT TIVA) with PONV risk factors: Yes  Provision of anti-emetic therapy with at least 2 different classes of agents: Yes   Patient DID NOT receive anti-emetic therapy and reason is documented in Medical Record:  N/A    Multimodal Pain Management- (477)  Non-emergent surgery AND patient age >= 18:   Use of Multimodal Pain Management, two or more drugs and/or interventions, NOT including systemic opioids:   Exception: Documented allergy to multiple classes of analgesics:     Smoking Abstinence (404)  Patient is current smoker (cigarette, pipe, e-cig, marijuanna):   Elective Surgery:   Abstinence instructions provided prior to day of surgery:   Patient abstained from smoking on day of surgery:     Pre-Op Beta-Blocker in Isolated CABG (44)  Isolated CABG AND patient age >= 18:   Beta-blocker admin within 24 hours of surgical incision:   Exception:of medical reason(s) for not administering beta blocker within 24 hours prior to surgical incision (e.g., not  indicated,other medical reason):     PACU assessment of acute postoperative pain prior to Anesthesia Care End- Applies to Patients Age = 18- (ABG7)  Initial PACU pain score is which of the following: < 7/10  Patient unable to report pain score: N/A    Post-anesthetic transfer of care checklist/protocol to PACU/ICU- (426 and 427)  Upon conclusion of case, patient transferred to which of the following locations: PACU/Non-ICU  Use of transfer checklist/protocol:   Exclusion: Service Performed in Patient Hospital Room (and thus did not require transfer):   Unplanned admission to ICU related to anesthesia service up through end of PACU care- (MD51)  Unplanned admission to ICU (not initially anticipated at anesthesia start time): No

## 2020-12-03 PROCEDURE — 770002 HCHG ROOM/CARE - OB PRIVATE (112)

## 2020-12-03 PROCEDURE — 700111 HCHG RX REV CODE 636 W/ 250 OVERRIDE (IP): Performed by: ANESTHESIOLOGY

## 2020-12-03 PROCEDURE — 700102 HCHG RX REV CODE 250 W/ 637 OVERRIDE(OP): Performed by: ANESTHESIOLOGY

## 2020-12-03 PROCEDURE — 700102 HCHG RX REV CODE 250 W/ 637 OVERRIDE(OP): Performed by: OBSTETRICS & GYNECOLOGY

## 2020-12-03 PROCEDURE — A9270 NON-COVERED ITEM OR SERVICE: HCPCS | Performed by: ANESTHESIOLOGY

## 2020-12-03 PROCEDURE — A9270 NON-COVERED ITEM OR SERVICE: HCPCS | Performed by: OBSTETRICS & GYNECOLOGY

## 2020-12-03 RX ORDER — IBUPROFEN 800 MG/1
800 TABLET ORAL EVERY 8 HOURS PRN
Status: DISCONTINUED | OUTPATIENT
Start: 2020-12-03 | End: 2020-12-04

## 2020-12-03 RX ORDER — OXYCODONE HYDROCHLORIDE 5 MG/1
5 TABLET ORAL EVERY 4 HOURS PRN
Status: DISCONTINUED | OUTPATIENT
Start: 2020-12-03 | End: 2020-12-05 | Stop reason: HOSPADM

## 2020-12-03 RX ORDER — ACETAMINOPHEN 325 MG/1
325 TABLET ORAL EVERY 4 HOURS PRN
Status: DISCONTINUED | OUTPATIENT
Start: 2020-12-03 | End: 2020-12-04

## 2020-12-03 RX ORDER — KETOROLAC TROMETHAMINE 30 MG/ML
30 INJECTION, SOLUTION INTRAMUSCULAR; INTRAVENOUS EVERY 6 HOURS
Status: DISCONTINUED | OUTPATIENT
Start: 2020-12-03 | End: 2020-12-03

## 2020-12-03 RX ADMIN — DOCUSATE SODIUM 100 MG: 100 CAPSULE, LIQUID FILLED ORAL at 05:42

## 2020-12-03 RX ADMIN — KETOROLAC TROMETHAMINE 30 MG: 30 INJECTION, SOLUTION INTRAMUSCULAR at 10:23

## 2020-12-03 RX ADMIN — KETOROLAC TROMETHAMINE 30 MG: 30 INJECTION, SOLUTION INTRAMUSCULAR at 04:16

## 2020-12-03 RX ADMIN — OXYCODONE HYDROCHLORIDE 5 MG: 5 TABLET ORAL at 23:38

## 2020-12-03 RX ADMIN — ACETAMINOPHEN 1000 MG: 500 TABLET ORAL at 04:16

## 2020-12-03 RX ADMIN — IBUPROFEN 800 MG: 800 TABLET, FILM COATED ORAL at 18:00

## 2020-12-03 RX ADMIN — PRENATAL WITH FERROUS FUM AND FOLIC ACID 1 TABLET: 3080; 920; 120; 400; 22; 1.84; 3; 20; 10; 1; 12; 200; 27; 25; 2 TABLET ORAL at 05:42

## 2020-12-03 RX ADMIN — ACETAMINOPHEN 1000 MG: 500 TABLET ORAL at 10:23

## 2020-12-03 RX ADMIN — ACETAMINOPHEN 325 MG: 325 TABLET, FILM COATED ORAL at 23:38

## 2020-12-03 RX ADMIN — SIMETHICONE 80 MG: 80 TABLET, CHEWABLE ORAL at 05:42

## 2020-12-03 ASSESSMENT — PAIN DESCRIPTION - PAIN TYPE
TYPE: ACUTE PAIN
TYPE: SURGICAL PAIN
TYPE: ACUTE PAIN;SURGICAL PAIN
TYPE: ACUTE PAIN
TYPE: ACUTE PAIN;SURGICAL PAIN

## 2020-12-03 ASSESSMENT — EDINBURGH POSTNATAL DEPRESSION SCALE (EPDS)
THINGS HAVE BEEN GETTING ON TOP OF ME: YES, SOMETIMES I HAVEN'T BEEN COPING AS WELL AS USUAL
I HAVE FELT SCARED OR PANICKY FOR NO GOOD REASON: NO, NOT MUCH
I HAVE BLAMED MYSELF UNNECESSARILY WHEN THINGS WENT WRONG: YES, SOME OF THE TIME
I HAVE LOOKED FORWARD WITH ENJOYMENT TO THINGS: RATHER LESS THAN I USED TO
THE THOUGHT OF HARMING MYSELF HAS OCCURRED TO ME: NEVER
I HAVE BEEN ANXIOUS OR WORRIED FOR NO GOOD REASON: YES, SOMETIMES
I HAVE BEEN SO UNHAPPY THAT I HAVE HAD DIFFICULTY SLEEPING: NOT AT ALL
I HAVE BEEN ABLE TO LAUGH AND SEE THE FUNNY SIDE OF THINGS: NOT QUITE SO MUCH NOW
I HAVE FELT SAD OR MISERABLE: NOT VERY OFTEN
I HAVE BEEN SO UNHAPPY THAT I HAVE BEEN CRYING: NO, NEVER

## 2020-12-03 NOTE — PROGRESS NOTES
Obstetrics & Gynecology Post-Delivery Progress Note    Date of Service      25 y.o.  s/p  for repeat  Delivery date: 20    Events  No events overnight. The patient has been able to pass flatus and ambulate. She endorses pain in the b/l lower ribs and abdominal wall, and does not endorse pain at the incision site. There has been minimal VB overnight, with less than one pad saturated with blood.    Subjective  Pain: Yes,  controlled  Bleeding: lochia minimal  Tolerating PO: yes  Voiding: without difficulty  Ambulating: yes  Passing flatus: Yes  Feeding: breastfeeding well    Objective  24hr VS:  Temp:  [35.9 °C (96.6 °F)-37.1 °C (98.8 °F)] 36.6 °C (97.9 °F)  Pulse:  [64-86] 72  Resp:  [16-20] 16  BP: (100-145)/(62-87) 126/75  SpO2:  [92 %-98 %] 93 %    Physical Exam  General: well  Chest/Breasts: deferred   Abdomen: TTP of b/l upper quadrants  Fundus: nontender  Incision: dressing clean, dry, intact  Perineum: deferred  Extremities: no edema, calves nontender    Labs:  No new labs this AM    Medications    •  acetaminophen, 1,000 mg, Oral, Q6HR    •  ketorolac, 30 mg, Intravenous, Q6HR    •  prenatal plus vitamin, 1 Tab, Oral, QAM      misoprostol, oxyCODONE immediate-release, oxyCODONE immediate release, HYDROmorphone, HYDROmorphone, ePHEDrine, ondansetron, diphenhydrAMINE, naloxone 0.4 mg in 1000 mL infusion, diphenhydrAMINE **OR** diphenhydrAMINE **OR** naloxone 0.4 mg in 1000 mL infusion, LR, misoprostol, docusate sodium, bisacodyl, magnesium hydroxide, simethicone, ondansetron **OR** ondansetron, diphenhydrAMINE **OR** diphenhydrAMINE      Assessment/Plan  Johanny Hubbard is a 25 y.o.yo  postop day #1  s/p  for repeat. She had vaginal blood loss of 218 ml yesterday afternoon, but no events overnight. She has mild b/l abdominal wall pain, but otherwise is progressing as expected.    - Post care: meeting all goals  - Pain: controlled  - Method of Feeding: plans to  breastfeed    - Disposition: likely home postop day 3

## 2020-12-03 NOTE — CARE PLAN
Problem: Discharge Barriers/Planning  Goal: Patient's continuum of care needs will be met  Outcome: PROGRESSING AS EXPECTED     Problem: Alteration in comfort related to surgical incision and/or after birth pains  Goal: Patient is able to ambulate, care for self and infant with acceptable pain level  Outcome: PROGRESSING AS EXPECTED  Goal: Patient verbalizes acceptable pain level  Outcome: PROGRESSING AS EXPECTED     Problem: Potential knowledge deficit related to lack of understanding of self and  care  Goal: Patient will verbalize understanding of self and infant care  Outcome: PROGRESSING AS EXPECTED  Goal: Patient will demonstrate ability to care for self and infant  Outcome: PROGRESSING AS EXPECTED

## 2020-12-03 NOTE — PROGRESS NOTES
upt to BR with assistance, ambulating well with steady gait. Perineal care rendered and back to bed comfortably.  No dizziness noted at this time. Will continue to monitor.

## 2020-12-03 NOTE — PROGRESS NOTES
Report received from NOC RN at shift change and chart reviewed. Bed in low/locked position, call light and personal belongings in reach.  FOB at bedside.    Assessment done and POC discussed.  Pain  controlled with PRN meds.  Denied further needs at this time. Increased ambulation today encouraged.

## 2020-12-03 NOTE — CARE PLAN
Problem: Altered physiologic condition related to postoperative  delivery  Goal: Patient physiologically stable as evidenced by normal lochia, palpable uterine involution and vital signs within normal limits  Outcome: PROGRESSING AS EXPECTED  Note: Fundus firm at U, lochia rubra minimal. Surgical incision with dressing CDI. V/S WNL.     Problem: Alteration in comfort related to surgical incision and/or after birth pains  Goal: Patient verbalizes acceptable pain level  Outcome: PROGRESSING AS EXPECTED  Note: Patient will be medicated as scheduled. Pain controlled at this time.

## 2020-12-03 NOTE — LACTATION NOTE
This note was copied from a baby's chart.  Follow-up visit, Baby 39.1 weeks, , MOB Hx GDM- diet controlled, Oversupply, 1st child passed away one year ago in MVA. MOB has baby swaddled & latched shallow at breast. Asked MOB if LC could un-swaddle & help position baby at breast, MOB agreed. Assisted baby to right breast in cross cradle laid back position, obtained deep latch with coordinated sucks & swallows heard- see latch assessment score. Hand expression demo done, able to easily express large drops of colostrum in few hand expressions, MOB states she did have oversupply with 1st baby.    Teaching on hunger cues, breastfeeding on cue a minimum 8x/24 hours or by 4 hours from last feed, importance of skin to skin, positioning baby at breast nipple to nose & cluster feeding.     MOB had WIC with 1st baby and desires WIC with this baby. Referral filled out and faxed to WIC.     Breastfeeding plan:  Breastfeed on cue a minimum 8x/24 hours or by 4 hours from last feed.

## 2020-12-03 NOTE — DISCHARGE PLANNING
Discharge Planning Assessment Post Partum    Reason for Referral: CHRISTOPHER's three year old son  one year ago from a MVA.  CHRISTOPHER scored a 10 on the EPDS screen.  Address: [address] [place] [id number]  Phone: [phone]  Type of Living Situation: living with FOB  Mom Diagnosis: Pregnancy,   Baby Diagnosis: -39.1 weeks  Primary Language: Slovenian and English    Name of Baby: Jing Vasquez (: 20)  Father of the Baby: Vazquez Gunter   Involved in baby’s care? Yes  Contact Information: [phone]    Prenatal Care: Yes  Mom's PCP: None  PCP for new baby: plans to use [facility]    Support System: FOB and family  Coping/Bonding between mother & baby: Yes  Source of Feeding: breast feeding  Supplies for Infant: prepared for infant; denies any needs    Mom's Insurance: Medicaid FFS  Baby Covered on Insurance:Yes  Mother Employed/School: ff[employer] environment  Other children in the home/names & ages: No, CHRISTOPHER's son  on 19 from a motor vehicle accident.  He was [age] years old and his name was Aramis Vasquez (: 16).    Financial Hardship/Income: denies   Mom's Mental status: alert and oriented  Services used prior to admit: Medicaid and plans to apply for [facility]    CPS History: No  Psychiatric History: No  Domestic Violence History: No  Drug/ETOH History: No    Resources Provided: post partum support and counseling resources and a children and family resource list  Referrals Made: [employer] referral provided     Clearance for Discharge:  RAJ offered support to CHRISTOPHER.  CHRISTOPHER states she is doing well and states she has good support from her family.  CHRISTOPHER denies having any counseling or therapy after her son .  SW offered MOB a list of counseling resources specializing in maternal mental health.  MOB denies the need for additional resources or information.

## 2020-12-04 PROCEDURE — A9270 NON-COVERED ITEM OR SERVICE: HCPCS | Performed by: OBSTETRICS & GYNECOLOGY

## 2020-12-04 PROCEDURE — A9270 NON-COVERED ITEM OR SERVICE: HCPCS | Performed by: STUDENT IN AN ORGANIZED HEALTH CARE EDUCATION/TRAINING PROGRAM

## 2020-12-04 PROCEDURE — 770002 HCHG ROOM/CARE - OB PRIVATE (112)

## 2020-12-04 PROCEDURE — 700102 HCHG RX REV CODE 250 W/ 637 OVERRIDE(OP): Performed by: STUDENT IN AN ORGANIZED HEALTH CARE EDUCATION/TRAINING PROGRAM

## 2020-12-04 PROCEDURE — 700102 HCHG RX REV CODE 250 W/ 637 OVERRIDE(OP): Performed by: OBSTETRICS & GYNECOLOGY

## 2020-12-04 RX ORDER — IBUPROFEN 800 MG/1
800 TABLET ORAL EVERY 8 HOURS
Status: DISCONTINUED | OUTPATIENT
Start: 2020-12-04 | End: 2020-12-05 | Stop reason: HOSPADM

## 2020-12-04 RX ORDER — ACETAMINOPHEN 500 MG
1000 TABLET ORAL EVERY 6 HOURS
Status: DISCONTINUED | OUTPATIENT
Start: 2020-12-05 | End: 2020-12-05 | Stop reason: HOSPADM

## 2020-12-04 RX ORDER — ACETAMINOPHEN 500 MG
1000 TABLET ORAL EVERY 6 HOURS
Status: DISCONTINUED | OUTPATIENT
Start: 2020-12-04 | End: 2020-12-04

## 2020-12-04 RX ADMIN — ACETAMINOPHEN 1000 MG: 500 TABLET ORAL at 14:56

## 2020-12-04 RX ADMIN — IBUPROFEN 800 MG: 800 TABLET, FILM COATED ORAL at 11:55

## 2020-12-04 RX ADMIN — ACETAMINOPHEN 1000 MG: 500 TABLET ORAL at 08:29

## 2020-12-04 RX ADMIN — PRENATAL WITH FERROUS FUM AND FOLIC ACID 1 TABLET: 3080; 920; 120; 400; 22; 1.84; 3; 20; 10; 1; 12; 200; 27; 25; 2 TABLET ORAL at 08:27

## 2020-12-04 RX ADMIN — IBUPROFEN 800 MG: 800 TABLET, FILM COATED ORAL at 18:12

## 2020-12-04 RX ADMIN — IBUPROFEN 800 MG: 800 TABLET, FILM COATED ORAL at 02:43

## 2020-12-04 ASSESSMENT — PAIN DESCRIPTION - PAIN TYPE
TYPE: SURGICAL PAIN
TYPE: ACUTE PAIN

## 2020-12-04 NOTE — PROGRESS NOTES
Assessment done. Pt.complains of pain at 3 and patient medicated. Incision approximated with dermabond and subcuticular stitches and without drainage, swelling or redness.Lochia scant to light. Pt ambulating in room without dizziness or assistance.Pt. Passing flatus, no problems urinating. Fob in room ,supportive.plan of care for today discussed.

## 2020-12-04 NOTE — CARE PLAN
Problem: Altered physiologic condition related to postoperative  delivery  Goal: Patient physiologically stable as evidenced by normal lochia, palpable uterine involution and vital signs within normal limits  Outcome: PROGRESSING AS EXPECTED  Note: Fundus firm at U, lochia rubra minimal. Surgical incision CDI. V/S WNL     Problem: Alteration in comfort related to surgical incision and/or after birth pains  Goal: Patient verbalizes acceptable pain level  Outcome: PROGRESSING AS EXPECTED  Note: Pain controlled at this time. Will be medicated as needed.

## 2020-12-04 NOTE — LACTATION NOTE
This note was copied from a baby's chart.  Mother reports that she is breastfeeding her  without difficulty or discomfort. She will be enrolled in [facility] this week, has phone number.

## 2020-12-04 NOTE — PROGRESS NOTES
Obstetrics & Gynecology Post-Delivery Progress Note    Date of Service      25 y.o.  s/p  for repeat  Delivery date: 20    Events  No events overnight. The patient is having moderate pain in the b/l lower ribs, left and right lower quadrants. She has otherwise been ambulating, passing flatus, and has no other complaints.    Subjective  Pain: Yes,  location b/l lower ribs, LLQ, RLQ  Bleeding: lochia minimal  Tolerating PO: yes  Voiding: without difficulty  Ambulating: yes  Passing flatus: Yes  Feeding: breastfeeding well    Objective  24hr VS:  Temp:  [36.3 °C (97.3 °F)-36.8 °C (98.2 °F)] 36.3 °C (97.3 °F)  Pulse:  [62-72] 62  Resp:  [16-18] 16  BP: (120-127)/(78-80) 120/78  SpO2:  [92 %-95 %] 92 %    Physical Exam  General: well  Chest/Breasts: deferred   Abdomen: nontender  Fundus: nontender  Incision: healing well  Perineum: deferred  Extremities: no edema and 1+ edema, calves nontender    Labs:  No new labs this AM    Medications    •  acetaminophen, 1,000 mg, Oral, Q6HRS    •  ibuprofen, 800 mg, Oral, Q8HR    •  prenatal plus vitamin, 1 Tab, Oral, QAM      oxyCODONE immediate-release, misoprostol, LR, misoprostol, docusate sodium, bisacodyl, magnesium hydroxide, simethicone, ondansetron **OR** ondansetron, diphenhydrAMINE **OR** diphenhydrAMINE      Assessment/Plan  Johanny Hubbard is a 25 y.o.yo  postop day #2  s/p  for repeat    - Post care: meeting all goals with exception of pain that has not been controlled well in the b/l lower ribs and lower abdominal quadrants. We discussed transferring as needed medications - tylenol and ibuprofen -  to a schedule and to ask for oxycodone when needed.  - Pain: tylenol and ibuprofen now scheduled; patient will ask for oxycodone as needed,  - Rh+, Rubella Immune  - Method of Feeding: plans to breastfeed  - Method of Contraception: tbd  VTE prophylaxis: SCDs    - Disposition: likely home postop day 3 - 4

## 2020-12-04 NOTE — CARE PLAN
Problem: Potential for postpartum infection related to surgical incision, compromised uterine condition, urinary tract or respiratory compromise  Goal: Patient will be afebrile and free from signs and symptoms of infection  Outcome: PROGRESSING AS EXPECTED  Note: Patient vitals stable.temp wnl. Patient has no fever or chills. Patient instructed in pericare with brittany bottle and q3-4 hour pad changes. Cbc shows no signs of infection.      Problem: Potential anxiety related to difficulty adapting to parental role  Goal: Patient will verbalize and demonstrate effective bonding and parenting behavior  12/4/2020 0934 by Jen Maldonado R.N.  Outcome: PROGRESSING AS EXPECTED  12/4/2020 0933 by Jen Maldonado R.N.  Outcome: PROGRESSING AS EXPECTED     Problem: Potential for postpartum infection related to surgical incision, compromised uterine condition, urinary tract or respiratory compromise  Goal: Patient will be afebrile and free from signs and symptoms of infection  Outcome: PROGRESSING AS EXPECTED  Note: Patient vitals stable.temp wnl. Patient has no fever or chills. Patient instructed in pericare with brittany bottle and q3-4 hour pad changes. Cbc shows no signs of infection.      Problem: Potential anxiety related to difficulty adapting to parental role  Goal: Patient will verbalize and demonstrate effective bonding and parenting behavior  12/4/2020 0934 by Jen Maldonado R.N.  Note: Patient bonding well with baby.holding and comforting baby when needed. Patient seeking information on baby care and has supportive family or friend to rely on .patient aware of parenting support agencies such as [facility] for infant care assistance and information.   12/4/2020 0934 by Jen Maldonado R.N.  Outcome: PROGRESSING AS EXPECTED  12/4/2020 0933 by Jen Maldonado R.N.  Outcome: PROGRESSING AS EXPECTED

## 2020-12-05 VITALS
SYSTOLIC BLOOD PRESSURE: 139 MMHG | HEART RATE: 64 BPM | WEIGHT: 173 LBS | DIASTOLIC BLOOD PRESSURE: 82 MMHG | RESPIRATION RATE: 18 BRPM | BODY MASS INDEX: 37.32 KG/M2 | HEIGHT: 57 IN | OXYGEN SATURATION: 95 % | TEMPERATURE: 97.5 F

## 2020-12-05 PROCEDURE — 700102 HCHG RX REV CODE 250 W/ 637 OVERRIDE(OP): Performed by: STUDENT IN AN ORGANIZED HEALTH CARE EDUCATION/TRAINING PROGRAM

## 2020-12-05 PROCEDURE — A9270 NON-COVERED ITEM OR SERVICE: HCPCS | Performed by: OBSTETRICS & GYNECOLOGY

## 2020-12-05 PROCEDURE — 700102 HCHG RX REV CODE 250 W/ 637 OVERRIDE(OP): Performed by: OBSTETRICS & GYNECOLOGY

## 2020-12-05 PROCEDURE — A9270 NON-COVERED ITEM OR SERVICE: HCPCS | Performed by: STUDENT IN AN ORGANIZED HEALTH CARE EDUCATION/TRAINING PROGRAM

## 2020-12-05 RX ORDER — PSEUDOEPHEDRINE HCL 30 MG
100 TABLET ORAL 2 TIMES DAILY
Qty: 60 CAP | Refills: 0 | Status: SHIPPED | OUTPATIENT
Start: 2020-12-05 | End: 2021-01-01

## 2020-12-05 RX ORDER — ACETAMINOPHEN 500 MG
1000 TABLET ORAL EVERY 6 HOURS PRN
Qty: 30 TAB | Refills: 0 | Status: SHIPPED | OUTPATIENT
Start: 2020-12-05 | End: 2021-01-01

## 2020-12-05 RX ORDER — IBUPROFEN 800 MG/1
800 TABLET ORAL EVERY 8 HOURS PRN
Qty: 30 TAB | Refills: 0 | Status: SHIPPED | OUTPATIENT
Start: 2020-12-05 | End: 2021-01-01

## 2020-12-05 RX ORDER — OXYCODONE HYDROCHLORIDE 5 MG/1
5 TABLET ORAL EVERY 4 HOURS PRN
Qty: 15 TAB | Refills: 0 | Status: SHIPPED | OUTPATIENT
Start: 2020-12-05 | End: 2020-12-10

## 2020-12-05 RX ADMIN — PRENATAL WITH FERROUS FUM AND FOLIC ACID 1 TABLET: 3080; 920; 120; 400; 22; 1.84; 3; 20; 10; 1; 12; 200; 27; 25; 2 TABLET ORAL at 08:00

## 2020-12-05 RX ADMIN — SIMETHICONE 80 MG: 80 TABLET, CHEWABLE ORAL at 04:55

## 2020-12-05 RX ADMIN — DOCUSATE SODIUM 100 MG: 100 CAPSULE, LIQUID FILLED ORAL at 04:55

## 2020-12-05 RX ADMIN — IBUPROFEN 800 MG: 800 TABLET, FILM COATED ORAL at 04:44

## 2020-12-05 RX ADMIN — OXYCODONE HYDROCHLORIDE 5 MG: 5 TABLET ORAL at 04:44

## 2020-12-05 ASSESSMENT — PAIN DESCRIPTION - PAIN TYPE: TYPE: CHRONIC PAIN

## 2020-12-05 NOTE — CARE PLAN
Problem: Potential for postpartum infection related to surgical incision, compromised uterine condition, urinary tract or respiratory compromise  Goal: Patient will be afebrile and free from signs and symptoms of infection  Outcome: PROGRESSING AS EXPECTED  Note: Patient has no S/S of infection noted at this time. VSS, Afebrile     Problem: Alteration in comfort related to surgical incision and/or after birth pains  Goal: Patient verbalizes acceptable pain level  Outcome: PROGRESSING AS EXPECTED  Note: Pain controlled at this time. Will be medicated as needed.

## 2020-12-05 NOTE — DISCHARGE PLANNING
Was asked by bedside RN for clarification on if baby is cleared to d/c home with mom. There were not any concerns noted on post partum assessment completed by RAJ Barrientos on 12/3 and bedside RN does not have any concerns at this time.     Baby is cleared to d/c home with MOB once medically cleared.

## 2020-12-05 NOTE — DISCHARGE INSTRUCTIONS
POSTPARTUM DISCHARGE INSTRUCTIONS FOR MOM    YOB: 1995   Age: 25 y.o.               Admit Date: 2020     Discharge Date: 2020  Attending Doctor:  Katelyn Smith, *                  Allergies:  Patient has no known allergies.    Discharged to home by car. Discharged via wheelchair, hospital escort: Yes.  Special equipment needed: Not Applicable  Belongings with: Personal  Be sure to schedule a follow-up appointment with your primary care doctor or any specialists as instructed.     Discharge Plan:   Diet Plan: Discussed  Activity Level: Discussed  Confirmed Follow up Appointment: Appointment Scheduled  Medication Reconciliation Updated: Yes  Influenza Vaccine Indication: Not indicated: Previously immunized this influenza season and > 8 years of age    REASONS TO CALL YOUR OBSTETRICIAN:  1.   Persistent fever or shaking chills (Temperature higher than 100.4)  2.   Heavy bleeding (soaking more than 1 pad per hour); Passing clots  3.   Foul odor from vagina  4.   Mastitis (Breast infection; breast pain, chills, fever, redness)  5.   Urinary pain, burning or frequency  6.   Episiotomy infection  7.   Abdominal incision infection  8.   Severe depression longer than 24 hours    HAND WASHING  · Prior to handling the baby.  · Before breastfeeding or bottle feeding baby.  · After using the bathroom or changing the baby's diaper.    WOUND CARE  Ask your physician for additional care instructions.  In general:    ·  Incision:      · Keep clean and dry.    · Do NOT lift anything heavier than your baby for up to 6 weeks.    · There should not be any opening or pus.      VAGINAL CARE  · Nothing inside vagina for 6 weeks: no sexual intercourse, tampons or douching.  · Bleeding may continue for 2-4 weeks.  Amount may vary.    · Call your physician for heavy bleeding which means soaking more than 1 pad per hour    BIRTH CONTROL  · It is possible to become pregnant at any time after delivery and  "while breastfeeding.  · Plan to discuss a method of birth control with your physician at your follow up visit. visit.    DIET AND ELIMINATION  · Eating more fiber (bran cereal, fruits, and vegetables) and drinking plenty of fluids will help to avoid constipation.  · Urinary frequency after childbirth is normal.    POSTPARTUM BLUES  During the first few days after birth, you may experience a sense of the \"blues\" which may include impatience, irritability or even crying.  These feeling come and go quickly.  However, as many as 1 in 10 women experience emotional symptoms known as postpartum depression.    Postpartum depression:  May start as early as the second or third day after delivery or take several weeks or months to develop.  Symptoms of \"blues\" are present, but are more intense:  Crying spells; loss of appetite; feelings of hopelessness or loss of control; fear of touching the baby; over concern or no concern at all about the baby; little or no concern about your own appearance/caring for yourself; and/or inability to sleep or excessive sleeping.  Contact your physician if you are experiencing any of these symptoms.    Crisis Hotline:  · [place] Crisis Hotline:  0-590-XFXCFWX  Or 1-846.291.2663  · [place] Crisis Hotline:  1-618.966.1269  Or 239-056-8769    PREVENTING SHAKEN BABY:  If you are angry or stressed, PUT THE BABY IN THE CRIB, step away, take some deep breaths, and wait until you are calm to care for the baby.  DO NOT SHAKE THE BABY.  You are not alone, call a supporter for help.    · Crisis Call Center 24/7 crisis line 367-817-5926 or 1-973.297.5449  · You can also text them, text \"ANSWER\" to 978769    QUIT SMOKING/TOBACCO USE:  I understand the use of any tobacco products increases my chance of suffering from future heart disease and could cause other illnesses which may shorten my life. Quitting the use of tobacco products is the single most important thing I can do to improve my health. For further " information on smoking / tobacco cessation call a Toll Free Quit Line at 1-192.870.5892 (*[employer] [place]) or 1-732.284.4934 (American Lung Association) or you can access the web based program at www.lungusa.org.    · [place] Tobacco Users Help Line:  (935) 583-9840       Toll Free: 1-978.302.6415  · Quit Tobacco Program [facility] (591)612-1830    DEPRESSION / SUICIDE RISK:  As you are discharged from this [facility], it is important to learn how to keep safe from harming yourself.    Recognize the warning signs:  · Abrupt changes in personality, positive or negative- including increase in energy   · Giving away possessions  · Change in eating patterns- significant weight changes-  positive or negative  · Change in sleeping patterns- unable to sleep or sleeping all the time   · Unwillingness or inability to communicate  · Depression  · Unusual sadness, discouragement and loneliness  · Talk of wanting to die  · Neglect of personal appearance   · Rebelliousness- reckless behavior  · Withdrawal from people/activities they love  · Confusion- inability to concentrate     If you or a loved one observes any of these behaviors or has concerns about self-harm, here's what you can do:  · Talk about it- your feelings and reasons for harming yourself  · Remove any means that you might use to hurt yourself (examples: pills, rope, extension cords, firearm)  · Get professional help from the community (Mental Health, Substance Abuse, psychological counseling)  · Do not be alone:Call your Safe Contact- someone whom you trust who will be there for you.  · Call your local CRISIS HOTLINE 803-6978 or 651-486-6247  · Call your local Children's Mobile Crisis Response Team [place] (907) 656-7109 or www.[facility] [facility]  · Call the toll free National Suicide Prevention Hotlines   · National Suicide Prevention Lifeline 896-369-KRIN (8974)  · National Hope Line Network 800-SUICIDE  (976-8463)    DISCHARGE SURVEY:  Thank you for choosing [facility].  We hope we provided you with very good care.  You may be receiving a survey in the mail.  Please fill it out.  Your opinion is valuable to us.    ADDITIONAL EDUCATIONAL MATERIALS GIVEN TO PATIENT:        My signature on this form indicates that:  1.  I have reviewed and understand the above information  2.  My questions regarding this information have been answered to my satisfaction.  3.  I have formulated a plan with my discharge nurse to obtain my prescribed medication for home.

## 2020-12-05 NOTE — LACTATION NOTE
"Followup visit. Baby asleep in mother's arms. MOB reports baby is latching well, but her left nipple is tender and she is using lanolin after airdrying her milk. MOB states\" I think that nipple is to big for her mouth.\"  Nipples intact but tender. MOB encouraged to break suction and relatch baby when suckling is painful. Discussed pumping or hand expressing on left if it becomes too uncmfortable to latch. CHRISTOPHER also reports she had an oversupply with her first baby. I discussed engorgement and how to manage it with her, she is using a haaka, and I will order a breast pump for her for home use to help manage engorgement discomfort.Encouraged to use warmth prior to feedings, cool packs after feedings, ibuprofen, hand expression and short pumping for relief of pressure in the breas, as needed.    "

## 2020-12-05 NOTE — PROGRESS NOTES
Informed Dr. Hair of 1800 vitals with increased b/p. Will continue to monitor vitals.Will change vitals to q4 hours. No other new orders for now.

## 2020-12-05 NOTE — PROGRESS NOTES
Bedside report done, patient in bed with FOB at bedside. Denies pain at this time. Will continue to monitor.

## 2020-12-05 NOTE — PROGRESS NOTES
Discharge education reviewed. Prescriptions given to pt. Pt wanting to stay until after lunch and states she will notify RN when they are ready. Cuddles remains in place at this time. Ebony HOPSON updated.
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
## 2020-12-16 ENCOUNTER — POST PARTUM (OUTPATIENT)
Dept: OBGYN | Facility: CLINIC | Age: 25
End: 2020-12-16
Payer: MEDICAID

## 2020-12-16 VITALS — BODY MASS INDEX: 32.52 KG/M2 | WEIGHT: 150.3 LBS | DIASTOLIC BLOOD PRESSURE: 66 MMHG | SYSTOLIC BLOOD PRESSURE: 110 MMHG

## 2020-12-16 DIAGNOSIS — Z09 POSTOP CHECK: ICD-10-CM

## 2020-12-16 PROCEDURE — 99024 POSTOP FOLLOW-UP VISIT: CPT | Performed by: OBSTETRICS & GYNECOLOGY

## 2020-12-16 NOTE — PROGRESS NOTES
SUBJECTIVE:  Johanny Hubbard presents to the clinic 2 weeks following C/S     Eating a regular diet without difficulty. Bowel movement are Normal.  The patient is not having any pain. Spotting. Patient Denies Incisional pain, drainage or redness    OBJECTIVE:  /66   Wt 68.2 kg (150 lb 4.8 oz)   LMP 02/25/2020 (Exact Date)   BMI 32.52 kg/m²   Current Outpatient Medications on File Prior to Visit   Medication Sig Dispense Refill   • acetaminophen (TYLENOL) 500 MG Tab Take 2 Tabs by mouth every 6 hours as needed. 30 Tab 0   • docusate sodium 100 MG Cap Take 100 mg by mouth 2 times a day. 60 Cap 0   • ibuprofen (MOTRIN) 800 MG Tab Take 1 Tab by mouth every 8 hours as needed. 30 Tab 0   • IRON-VITAMINS PO Take  by mouth.     • Multiple Vitamin (MULTI-VITAMINS PO) Take  by mouth.     • Blood Glucose Meter Kit Test blood sugar as recommended by provider. 1 Kit 0   • Blood Glucose Test Strips Use one strip to test blood sugar as directed by provider. 100 Each 1   • Lancets Use one lancet to test blood sugar as directed by provider. 100 Each 0   • Alcohol Swabs Wipe site with prep pad prior to injection. 100 Each 0     No current facility-administered medications on file prior to visit.        Constitutional:  alert, no distress.  Abdomen:  soft, bowel sounds active, non-tender.  Incision:  healing well, no drainage, no erythema, no hernia, no seroma, no swelling, no dehiscence, incision well approximated.    IMPRESSION: Doing well postoperatively.  Pt is to increase activities as tolerated.    Lab:   Recent Results (from the past 1008 hour(s))   POCT Fetal Nonstress Test    Collection Time: 11/05/20  9:18 AM   Result Value Ref Range    NST Indications polyhydramnios, decreased FM; A1GDM     NST Baseline 140     NST Uterine Activity 1 ctx     NST Acoustic Stimulation n/a     NST Assessment reactive     NST Action Necessary none     NST Other Data mod fela/no decels     NST Return 1x weekly     NST Read By odilia  md khadijah    POCT Glucose    Collection Time: 11/12/20  3:41 PM   Result Value Ref Range    Glucose - Accu-Ck 93 70 - 100 mg/dL   POCT Fetal Nonstress Test    Collection Time: 11/12/20  4:19 PM   Result Value Ref Range    NST Indications      NST Baseline      NST Uterine Activity      NST Acoustic Stimulation      NST Assessment      NST Action Necessary      NST Other Data      NST Return      NST Read By     GRP B STREP, BY PCR (RIVAS BROTH)    Collection Time: 11/12/20  4:21 PM    Specimen: Genital   Result Value Ref Range    Strep Gp B DNA PCR Negative Negative   COVID/SARS CoV-2 PCR    Collection Time: 11/29/20 12:13 PM    Specimen: Nasopharyngeal; Respirate   Result Value Ref Range    COVID Order Status Received    SARS-CoV-2, PCR (In-House)    Collection Time: 11/29/20 12:13 PM   Result Value Ref Range    SARS-CoV-2 Source NP Swab     SARS-CoV-2 by PCR NotDetected NotDetected   Hold Blood Bank Specimen (Not Tested)    Collection Time: 12/02/20  8:15 AM   Result Value Ref Range    Holding Tube - Bb DONE    CBC with Differential    Collection Time: 12/02/20  8:15 AM   Result Value Ref Range    WBC 7.9 4.8 - 10.8 K/uL    RBC 4.39 4.20 - 5.40 M/uL    Hemoglobin 12.6 12.0 - 16.0 g/dL    Hematocrit 39.8 37.0 - 47.0 %    MCV 90.7 81.4 - 97.8 fL    MCH 28.7 27.0 - 33.0 pg    MCHC 31.7 (L) 33.6 - 35.0 g/dL    RDW 43.3 35.9 - 50.0 fL    Platelet Count 184 164 - 446 K/uL    MPV 12.4 9.0 - 12.9 fL    Neutrophils-Polys 64.30 44.00 - 72.00 %    Lymphocytes 25.20 22.00 - 41.00 %    Monocytes 9.10 0.00 - 13.40 %    Eosinophils 0.40 0.00 - 6.90 %    Basophils 0.50 0.00 - 1.80 %    Immature Granulocytes 0.50 0.00 - 0.90 %    Nucleated RBC 0.00 /100 WBC    Neutrophils (Absolute) 5.10 2.00 - 7.15 K/uL    Lymphs (Absolute) 2.00 1.00 - 4.80 K/uL    Monos (Absolute) 0.72 0.00 - 0.85 K/uL    Eos (Absolute) 0.03 0.00 - 0.51 K/uL    Baso (Absolute) 0.04 0.00 - 0.12 K/uL    Immature Granulocytes (abs) 0.04 0.00 - 0.11 K/uL    NRBC  (Absolute) 0.00 K/uL   CBC without differential    Collection Time: 12/02/20  6:05 PM   Result Value Ref Range    WBC 9.6 4.8 - 10.8 K/uL    RBC 3.53 (L) 4.20 - 5.40 M/uL    Hemoglobin 10.1 (L) 12.0 - 16.0 g/dL    Hematocrit 31.0 (L) 37.0 - 47.0 %    MCV 87.8 81.4 - 97.8 fL    MCH 28.6 27.0 - 33.0 pg    MCHC 32.6 (L) 33.6 - 35.0 g/dL    RDW 41.7 35.9 - 50.0 fL    Platelet Count 170 164 - 446 K/uL    MPV 12.2 9.0 - 12.9 fL       PLAN:  Continue any current medications.  (See Med List for details.)  Return to clinic  : in 3 week(s).

## 2020-12-16 NOTE — PROGRESS NOTES
Patient here for C Section check.  C Section done on 12/02/2020  Patient is Breast feeding   Vaginal bleeding is spotting   PP visit will make after todays appt   Phone number: [phone]  Pharmacy verified

## 2021-01-01 ENCOUNTER — HOSPITAL ENCOUNTER (EMERGENCY)
Facility: MEDICAL CENTER | Age: 26
End: 2021-01-01
Attending: EMERGENCY MEDICINE
Payer: MEDICAID

## 2021-01-01 VITALS
OXYGEN SATURATION: 96 % | TEMPERATURE: 96.9 F | BODY MASS INDEX: 32.29 KG/M2 | DIASTOLIC BLOOD PRESSURE: 72 MMHG | SYSTOLIC BLOOD PRESSURE: 106 MMHG | WEIGHT: 149.69 LBS | HEART RATE: 63 BPM | HEIGHT: 57 IN | RESPIRATION RATE: 16 BRPM

## 2021-01-01 DIAGNOSIS — L08.9 WOUND INFECTION: ICD-10-CM

## 2021-01-01 DIAGNOSIS — T14.8XXA WOUND INFECTION: ICD-10-CM

## 2021-01-01 LAB — GLUCOSE BLD-MCNC: 103 MG/DL (ref 65–99)

## 2021-01-01 PROCEDURE — 82962 GLUCOSE BLOOD TEST: CPT

## 2021-01-01 PROCEDURE — 700102 HCHG RX REV CODE 250 W/ 637 OVERRIDE(OP): Performed by: EMERGENCY MEDICINE

## 2021-01-01 PROCEDURE — 99283 EMERGENCY DEPT VISIT LOW MDM: CPT

## 2021-01-01 PROCEDURE — A9270 NON-COVERED ITEM OR SERVICE: HCPCS | Performed by: EMERGENCY MEDICINE

## 2021-01-01 RX ORDER — AMOXICILLIN AND CLAVULANATE POTASSIUM 875; 125 MG/1; MG/1
1 TABLET, FILM COATED ORAL 2 TIMES DAILY
Qty: 20 TAB | Refills: 0 | Status: SHIPPED | OUTPATIENT
Start: 2021-01-01 | End: 2021-01-11

## 2021-01-01 RX ORDER — NYSTATIN 100000 [USP'U]/G
1 POWDER TOPICAL 4 TIMES DAILY
Qty: 30 G | Refills: 0 | Status: SHIPPED | OUTPATIENT
Start: 2021-01-01 | End: 2023-01-17

## 2021-01-01 RX ORDER — NYSTATIN 100000 [USP'U]/G
POWDER TOPICAL ONCE
Status: COMPLETED | OUTPATIENT
Start: 2021-01-01 | End: 2021-01-01

## 2021-01-01 RX ORDER — AMOXICILLIN AND CLAVULANATE POTASSIUM 875; 125 MG/1; MG/1
1 TABLET, FILM COATED ORAL ONCE
Status: COMPLETED | OUTPATIENT
Start: 2021-01-01 | End: 2021-01-01

## 2021-01-01 RX ADMIN — NYSTATIN: 100000 POWDER TOPICAL at 18:57

## 2021-01-01 RX ADMIN — AMOXICILLIN AND CLAVULANATE POTASSIUM 1 TABLET: 875; 125 TABLET, FILM COATED ORAL at 18:57

## 2021-01-01 ASSESSMENT — LIFESTYLE VARIABLES: DO YOU DRINK ALCOHOL: NO

## 2021-01-02 NOTE — ED NOTES
Pt provided discharge instructions. Pt verbalized understanding. Pt leaving ER in stable condition, pt ambulatory with steady gait. Provided pt with extra abdominal pads for home.

## 2021-01-02 NOTE — ED TRIAGE NOTES
Chief Complaint   Patient presents with   • Drainage from Incision           Pt ambulated to triage, she had   and f/u with doctor after couple weeks where they removed some of surgical glue. Pt noticed area of incision opening left side with drainage last week and increasing today. She also  Reports odd smell from the drainage.   fsbs 103

## 2021-01-02 NOTE — ED PROVIDER NOTES
ED Provider Note    Scribed for Laura Boogie M.D. by Jaron Carlos. 2021, 5:46 PM.    Primary care provider: Pcp Pt States None  Means of arrival: walk-in  History obtained from: patient  History limited by: none    CHIEF COMPLAINT  Chief Complaint   Patient presents with   • Drainage from Incision           This patient was cared for during the COVID-19 pandemic. History and physical exam may be limited/truncated by the inherent challenges of PPE and the need to decrease staff exposure to novel coronavirus. Some aspects of disease management may be different to protect staff and help slow the spread of disease. I verified that, if possible, the patient was wearing a mask and I was wearing appropriate PPE every time I encountered the patient.      HPI  Johanny Hubbard is a 25 y.o. female who presents to the Emergency Department with concerns for purulent drainage from her  incision site. She first started noticing pain and swelling about 1 week ago; her procedure was 1 month ago on 2020. She denies any fevers or chills. She reports that she has been feeling more lightheaded recently. She is currently breastfeeding.     REVIEW OF SYSTEMS  Pertinent positives include incision site driainage. Pertinent negatives include no fever or chills.  All other systems reviewed and negative.    PAST MEDICAL HISTORY   has a past medical history of GDM (gestational diabetes mellitus) (10/13/2020), Heart burn, Pregnant, and Snoring.    SURGICAL HISTORY   has a past surgical history that includes primary c section (2016) and repeat c section (N/A, 2020).    SOCIAL HISTORY  Social History     Tobacco Use   • Smoking status: Never Smoker   • Smokeless tobacco: Never Used   Substance Use Topics   • Alcohol use: No   • Drug use: No      Social History     Substance and Sexual Activity   Drug Use No       FAMILY HISTORY  Family History   Problem Relation Age of Onset   • No Known Problems  "Mother    • No Known Problems Father    • No Known Problems Brother    • Seizures Maternal Grandmother        CURRENT MEDICATIONS  Current Outpatient Medications   Medication Instructions   • Lancets Use one lancet to test blood sugar as directed by provider.   • Multiple Vitamin (MULTI-VITAMINS PO) Oral       ALLERGIES  No Known Allergies    PHYSICAL EXAM  VITAL SIGNS: /73   Pulse 71   Temp 36.1 °C (96.9 °F) (Temporal)   Resp 16   Ht 1.448 m (4' 9\")   Wt 67.9 kg (149 lb 11.1 oz)   LMP 2020 (Exact Date)   SpO2 98%   BMI 32.39 kg/m²     Constitutional: Alert in no apparent distress. Well apearing  HENT: Normocephalic, Atraumatic, Bilateral external ears normal. Nose normal.   Eyes:  Conjunctiva normal, non-icteric.   Lungs: Non-labored respirations  Abdomen: Left side of  wound has a 1 cm area of wound dehiscence. No surrounding erythema. Center of wound has mild skin breakdown, moist.   Skin: Warm, Dry. See above.   Neurologic: Alert, Grossly non-focal.   Psychiatric: Affect normal, Judgment normal, Mood normal, Appears appropriate and not intoxicated.       LABS  Labs Reviewed   ACCU-CHEK GLUCOSE - Abnormal; Notable for the following components:       Result Value    Glucose - Accu-Ck 103 (*)     All other components within normal limits     All labs reviewed by me.    COURSE & MEDICAL DECISION MAKING  Pertinent Labs & Imaging studies reviewed. (See chart for details)    5:46 PM - Patient seen and examined at bedside.  Her abdomen is soft nontender I am not concerned for an intra-abdominal abscess at this time.  It does appear that she has some wound dehiscence on the left side of the wound the central area looks more like skin breakdown from yeast.  Patient was treated with Augmentin and nystatin powder in the ED and given prescriptions as well. Discussed wound treatment and management. Advised follow-up with her OB/GYN. ED return precautions were reviewed.     HTN/IDDM FOLLOW UP:  The " patient is referred to a primary physician for blood pressure management, diabetic screening, and for all other preventive health concerns      The patient will return for new or worsening symptoms and is stable at the time of discharge. Patient was given return precautions. Patient and/or family member verbalizes understanding and will comply.    DISPOSITION:  Patient will be discharged home in stable condition.    FOLLOW UP:  [facility]  [address]  [place] [id number] [phone]  Schedule an appointment as soon as possible for a visit in 1 week  for wound recheck    [facility], Emergency Dept  [address] [id number] [phone]    Return for worsening pain redness swelling or other concerns      OUTPATIENT MEDICATIONS:  Discharge Medication List as of 1/1/2021  7:24 PM      START taking these medications    Details   amoxicillin-clavulanate (AUGMENTIN) 875-125 MG Tab Take 1 Tab by mouth 2 times a day for 10 days., Disp-20 Tab, R-0, Normal      nystatin (MYCOSTATIN) powder Apply 1 g topically 4 times a day.Apply to central area of woundDisp-30 g, R-0, Normal              FINAL IMPRESSION  1. Wound infection               This dictation has been created using voice recognition software and/or scribes. The accuracy of the dictation is limited by the abilities of the software and the expertise of the scribes. I expect there may be some errors of grammar and possibly content. I made every attempt to manually correct the errors within my dictation. However, errors related to voice recognition software and/or scribes may still exist and should be interpreted within the appropriate context.     Jaron MONTEIRO (Lathaibgarry), am scribing for, and in the presence of, Laura Boogie M.D..    Electronically signed by: Jaron Carlos (Mary), 1/1/2021    Laura MONTEIRO M.D. personally performed the services described in this  documentation, as scribed by Jaron Carlos in my presence, and it is both accurate and complete.    The note accurately reflects work and decisions made by me.  Laura Boogie M.D.  1/1/2021  8:13 PM

## 2021-01-15 ENCOUNTER — POST PARTUM (OUTPATIENT)
Dept: OBGYN | Facility: CLINIC | Age: 26
End: 2021-01-15

## 2021-01-15 VITALS — SYSTOLIC BLOOD PRESSURE: 106 MMHG | BODY MASS INDEX: 32.55 KG/M2 | WEIGHT: 150.4 LBS | DIASTOLIC BLOOD PRESSURE: 60 MMHG

## 2021-01-15 PROBLEM — O24.419 GDM (GESTATIONAL DIABETES MELLITUS): Status: RESOLVED | Noted: 2020-10-13 | Resolved: 2021-01-15

## 2021-01-15 PROBLEM — O40.9XX0 POLYHYDRAMNIOS AFFECTING PREGNANCY: Status: RESOLVED | Noted: 2020-10-13 | Resolved: 2021-01-15

## 2021-01-15 PROBLEM — O09.892 SUPERVISION OF OTHER HIGH RISK PREGNANCIES, SECOND TRIMESTER: Status: RESOLVED | Noted: 2020-07-17 | Resolved: 2021-01-15

## 2021-01-15 PROBLEM — O28.3 ABNORMAL FETAL ULTRASOUND: Status: RESOLVED | Noted: 2020-10-13 | Resolved: 2021-01-15

## 2021-01-15 PROBLEM — O34.219 HISTORY OF CESAREAN DELIVERY, CURRENTLY PREGNANT: Status: RESOLVED | Noted: 2020-05-11 | Resolved: 2021-01-15

## 2021-01-15 PROCEDURE — 0503F POSTPARTUM CARE VISIT: CPT | Performed by: NURSE PRACTITIONER

## 2021-01-15 ASSESSMENT — ENCOUNTER SYMPTOMS
PSYCHIATRIC NEGATIVE: 1
CARDIOVASCULAR NEGATIVE: 1
CONSTITUTIONAL NEGATIVE: 1
EYES NEGATIVE: 1
RESPIRATORY NEGATIVE: 1
NEUROLOGICAL NEGATIVE: 1
GASTROINTESTINAL NEGATIVE: 1
MUSCULOSKELETAL NEGATIVE: 1

## 2021-01-15 ASSESSMENT — EDINBURGH POSTNATAL DEPRESSION SCALE (EPDS)
I HAVE FELT SAD OR MISERABLE: NOT VERY OFTEN
THE THOUGHT OF HARMING MYSELF HAS OCCURRED TO ME: NEVER
I HAVE LOOKED FORWARD WITH ENJOYMENT TO THINGS: RATHER LESS THAN I USED TO
I HAVE BEEN ABLE TO LAUGH AND SEE THE FUNNY SIDE OF THINGS: NOT QUITE SO MUCH NOW
TOTAL SCORE: 12
I HAVE BLAMED MYSELF UNNECESSARILY WHEN THINGS WENT WRONG: NOT VERY OFTEN
I HAVE BEEN SO UNHAPPY THAT I HAVE BEEN CRYING: YES, QUITE OFTEN
I HAVE BEEN SO UNHAPPY THAT I HAVE HAD DIFFICULTY SLEEPING: NOT VERY OFTEN
I HAVE BEEN ANXIOUS OR WORRIED FOR NO GOOD REASON: YES, SOMETIMES
THINGS HAVE BEEN GETTING ON TOP OF ME: YES, SOMETIMES I HAVEN'T BEEN COPING AS WELL AS USUAL
I HAVE FELT SCARED OR PANICKY FOR NO GOOD REASON: NO, NOT MUCH
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
## 2021-01-15 NOTE — PROGRESS NOTES
Pt here today for postpartum exam,delivery type c/s on 12/02/2020  Currently breast feeding  BCM: Pt is thinking about a Mirena , [facility]   LMP: not yet  Good ph:[phone]  Last pap smear 05/11/2020 WNL  Chaperone offered and not indicated  Pt was seen at the ER on 01/01/2020 because she states her c/s incision was opening up

## 2021-01-21 ENCOUNTER — GYNECOLOGY VISIT (OUTPATIENT)
Dept: OBGYN | Facility: CLINIC | Age: 26
End: 2021-01-21
Payer: OTHER GOVERNMENT

## 2021-01-21 VITALS
SYSTOLIC BLOOD PRESSURE: 110 MMHG | DIASTOLIC BLOOD PRESSURE: 50 MMHG | WEIGHT: 153 LBS | BODY MASS INDEX: 33.01 KG/M2 | HEIGHT: 57 IN

## 2021-01-21 DIAGNOSIS — Z30.014 ENCOUNTER FOR INITIAL PRESCRIPTION OF INTRAUTERINE CONTRACEPTIVE DEVICE (IUD): Primary | ICD-10-CM

## 2021-01-21 DIAGNOSIS — Z30.430 ENCOUNTER FOR INSERTION OF MIRENA IUD: ICD-10-CM

## 2021-01-21 PROBLEM — Z30.017 ENCOUNTER FOR INITIAL PRESCRIPTION OF IMPLANTABLE SUBDERMAL CONTRACEPTIVE: Status: ACTIVE | Noted: 2021-01-21

## 2021-01-21 PROCEDURE — 58300 INSERT INTRAUTERINE DEVICE: CPT | Performed by: NURSE PRACTITIONER

## 2021-01-21 NOTE — PROCEDURES
IUD Insertion    Date/Time: 1/21/2021 2:06 PM  Performed by: JUDITH Rocha  Authorized by: JUDITH Rocha     Consent:     Consent obtained:  Verbal and written    Consent given by:  Patient    Procedure risks and benefits discussed: yes      Patient questions answered: yes      Patient agrees, verbalizes understanding, and wants to proceed: yes      Educational handouts given: yes      Instructions and paperwork completed: yes    Pre-procedure details:     Negative urine pregnancy test: yes    Procedure:     Pelvic exam performed: yes      Sterile speculum placed in vagina: yes      Cervix visualized: yes      Cervix cleaned and prepped in sterile fashion: yes      Tenaculum applied to cervix: yes      Dilation needed: no      Uterus sounded: yes      Uterus sound depth (cm):  7.5    IUD inserted with no complications: yes      IUD type:  Mirena    Strings trimmed: yes    Post-procedure:     Patient tolerated procedure well: yes      Patient will follow up after next period: yes        Today the patient is counseled on the risks of IUD insertion. Specifically discussed were alternative forms of birth control. I also discussed with the patient the risk of infection on insertion, and had asked the patient to remain on pelvic rest for one week following the insertion. We also discussed the risk of IUD expulsion, the risk of uterine perforation and IUD migration. If the IUD does migrate the patient may require a separate procedure such as a laparoscopy to retrieve the migrated IUD. I also discussed the 1% risk of pregnancy with IUD use. I also discussed the side effects of Mirena IUD which can be amenorrhea or dysfunctional uterine bleeding or spotting.  Patient had the opportunity to ask questions regarding insertion, risks and benefits, all questions are answered in their entirety.  Informed consent is signed    Procedure note  Urine pregnancy test is negative, informed consent was previously  signed  The bimanual exam is performed the uterus is noted to be 7 weeks in size and is mid position  A speculum was inserted into the vagina, the cervix was cleansed with Betadine swabs x3  Tenaculum was placed on the anterior lip of the cervix at 11:00 and 1:00   The uterus was sounded to 7.5 centimeters  The IUD is placed under sterile conditions: no complications  The strings trimmed to approximately 3 cm  Tenaculum was removed from the cervix and hemostasis was achieved with pressure only  The patient tolerated the procedure well      Patient is asked to followup in 4-6 weeks for IUD check. The patient is asked to remain on pelvic rest for 2-3 days.  Use a backup method for 7 days, condoms. She is asked to return sooner than 4-6 weeks for heavy vaginal bleeding uncontrolled pain or fever

## 2021-01-21 NOTE — NON-PROVIDER
Patient here for a GYN follow up.   Last seen on 01/15/2021  pharmacy verified.  Patient phone #: [phone]  Mirena Insertion   UPT Negative

## 2021-02-18 ENCOUNTER — GYNECOLOGY VISIT (OUTPATIENT)
Dept: OBGYN | Facility: CLINIC | Age: 26
End: 2021-02-18

## 2021-02-18 VITALS
WEIGHT: 158.6 LBS | DIASTOLIC BLOOD PRESSURE: 60 MMHG | HEIGHT: 58 IN | SYSTOLIC BLOOD PRESSURE: 110 MMHG | BODY MASS INDEX: 33.29 KG/M2

## 2021-02-18 DIAGNOSIS — Z30.431 CONTRACEPTIVE, SURVEILLANCE, INTRAUTERINE DEVICE: Primary | ICD-10-CM

## 2021-02-18 PROCEDURE — 99212 OFFICE O/P EST SF 10 MIN: CPT | Performed by: NURSE PRACTITIONER

## 2021-02-18 NOTE — PROGRESS NOTES
S: Johanny Hubbard is a 25 y.o. y.o. female who presents for contraceptive surveillance.    Pt had a Mirena IUD placed on 1/21/21 without any complications and presents for an IUD check up. She reports no bleeding, no pain, no discomfort with intercourse, no discharge. Denies fever, chills, nausea, vomiting. Overall very happy with device.    O:  FEMALE GYN: normal female external genitalia without lesions, clear vaginal discharge noted, vulva pink without erythema or friability, urethra is normal without discharge or scarring, no bladder fullness or masses, normal vagina and normal vaginal tone, normal cervix, normal  uterus, size and consistency, 2 black IUD strings seen at cervical os, 1.5 cm in length, normal anus and perineum.    A/P:  -Discussed coordination of care of IUD and normal menstrual irregularity side effects. -Reminded patient to check for strings monthly and to call the office if IUD has fallen out or any other problems should arise.  -Reminded patient IUD expires in 3 years.  -Reminded of annual gyn visit in one year.

## 2021-02-18 NOTE — NON-PROVIDER
Patient here for a GYN follow up.   Last seen on 01/21/2021  pharmacy verified.  Patient phone #: [phone]  Mirena Check   C/o  Heavy bleeding

## 2022-05-26 ENCOUNTER — HOSPITAL ENCOUNTER (OUTPATIENT)
Dept: RADIOLOGY | Facility: MEDICAL CENTER | Age: 27
End: 2022-05-26
Payer: OTHER MISCELLANEOUS

## 2022-05-26 DIAGNOSIS — Z30.432 ENCOUNTER FOR IUD REMOVAL: ICD-10-CM

## 2023-01-16 NOTE — PROGRESS NOTES
Subjective:     HPI Comments: Johanny Hubbard is a 27 y.o. female who presents for dysfunctional uterine bleeding***. LMP: 10/01/2022 (exact/unsure*** date). {[facility]2:[id number]}    Review of Systems   Pertinent positives documented in HPI and all other systems reviewed & are negative.    Past Medical History:   Diagnosis Date    GDM (gestational diabetes mellitus) 10/13/2020    Heart burn     Pregnant     Snoring        Medications:   Current Outpatient Medications Ordered in Epic   Medication Sig Dispense Refill    nystatin (MYCOSTATIN) powder Apply 1 g topically 4 times a day. 30 g 0    Lancets Use one lancet to test blood sugar as directed by provider. 100 Each 0    Multiple Vitamin (MULTI-VITAMINS PO) Take  by mouth.       No current Epic-ordered facility-administered medications on file.          Objective:   Vital measurements:  There were no vitals taken for this visit.  There is no height or weight on file to calculate BMI. (Goal BM I>18 <25)    Physical Exam   Nursing note and vitals reviewed.  Constitutional: She is oriented to person, place, and time. She appears well-developed and well-nourished. No distress.     Genitourinary:  Uterus size of ***   No vaginal bleeding or discharge noted.     Musculoskeletal: Normal range of motion. She exhibits no edema and no tenderness.     Skin: Skin is warm and dry. No rash noted. She is not diaphoretic. No erythema. No pallor.     Psychiatric: She has a normal mood and affect. Her behavior is normal. Judgment and thought content normal.     *** Ultrasound  Indication: ***    Findings:  CRL *** ***  BPD *** ***   HC *** ***  AC *** ***  FL *** ***    FHR: ***  ***+/- FM  Grossly normal amniotic fluid***      Impression: ***    Per ACOG dating guidelines, final YESSICA is *** based on ***    Ultrasound performed and read by myself.        Assessment:   No diagnosis found.    Plan:   Discussed importance of prenatal vitamins with patient. Encouraged daily use.  ER  return precautions reviewed to include but not limited to abdominal pain, severe cramping, and vaginal bleeding.  ***  Follow up in *** weeks for NOB visit.

## 2023-01-17 ENCOUNTER — GYNECOLOGY VISIT (OUTPATIENT)
Dept: OBGYN | Facility: CLINIC | Age: 28
End: 2023-01-17

## 2023-01-17 ENCOUNTER — INITIAL PRENATAL (OUTPATIENT)
Dept: OBGYN | Facility: CLINIC | Age: 28
End: 2023-01-17

## 2023-01-17 VITALS
DIASTOLIC BLOOD PRESSURE: 50 MMHG | SYSTOLIC BLOOD PRESSURE: 112 MMHG | HEIGHT: 59 IN | BODY MASS INDEX: 30.4 KG/M2 | WEIGHT: 150.8 LBS

## 2023-01-17 DIAGNOSIS — N93.9 ABNORMAL UTERINE BLEEDING: ICD-10-CM

## 2023-01-17 DIAGNOSIS — N93.8 DUB (DYSFUNCTIONAL UTERINE BLEEDING): ICD-10-CM

## 2023-01-17 PROBLEM — Z30.014 ENCOUNTER FOR INITIAL PRESCRIPTION OF INTRAUTERINE CONTRACEPTIVE DEVICE (IUD): Status: RESOLVED | Noted: 2021-01-21 | Resolved: 2023-01-17

## 2023-01-17 PROBLEM — Z30.431 CONTRACEPTIVE, SURVEILLANCE, INTRAUTERINE DEVICE: Status: RESOLVED | Noted: 2021-02-18 | Resolved: 2023-01-17

## 2023-01-17 LAB
INT CON NEG: NEGATIVE
INT CON POS: POSITIVE
POC URINE PREGNANCY TEST: POSITIVE

## 2023-01-17 PROCEDURE — 99213 OFFICE O/P EST LOW 20 MIN: CPT | Mod: 25

## 2023-01-17 PROCEDURE — 76705 ECHO EXAM OF ABDOMEN: CPT

## 2023-01-17 NOTE — PROGRESS NOTES
DUB visit  LMP: 10/01/2022  WT: 150.8 lb  BP: 112/50  Preferred pharmacy verified with pt.  Pt states having N&V and dizziness. States no other complaints or concerns today.   Jaime # [phone]    +UPT today, done in clinic

## 2023-01-17 NOTE — PROGRESS NOTES
"   Subjective:     HPI Comments: Johanny Hubbard is a 27 y.o. female who presents for abnormal uterine bleeding. LMP: 10/01/2022 (exact date). Her menses are regular. Pt is unsure whether or not she feels fetal movement. She is experiencing some N/V but is able to keep food and water down. Also reports occasionally dizzy. Drinks 4 bottles water/day.    Review of Systems   Pertinent positives documented in HPI and all other systems reviewed & are negative.    Past Medical History:   Diagnosis Date    GDM (gestational diabetes mellitus) 10/13/2020    Heart burn     Pregnant     Snoring        Medications:   Current Outpatient Medications Ordered in Epic   Medication Sig Dispense Refill    nystatin (MYCOSTATIN) powder Apply 1 g topically 4 times a day. (Patient not taking: Reported on 1/17/2023) 30 g 0    Lancets Use one lancet to test blood sugar as directed by provider. (Patient not taking: Reported on 1/17/2023) 100 Each 0    Multiple Vitamin (MULTI-VITAMINS PO) Take  by mouth.       No current Epic-ordered facility-administered medications on file.          Objective:   Vital measurements:  /50 (BP Location: Right arm, Patient Position: Sitting, BP Cuff Size: Adult)   Ht 4' 11\"   Wt 150 lb 12.8 oz   Body mass index is 30.46 kg/m². (Goal BM I>18 <25)    Physical Exam   Nursing note and vitals reviewed.  Constitutional: She is oriented to person, place, and time. She appears well-developed and well-nourished. No distress.     Genitourinary:  Uterus size of 15   No vaginal bleeding or discharge noted.     Musculoskeletal: Normal range of motion. She exhibits no edema and no tenderness.     Skin: Skin is warm and dry. No rash noted. She is not diaphoretic. No erythema. No pallor.     Psychiatric: She has a normal mood and affect. Her behavior is normal. Judgment and thought content normal.     Transabdominal Ultrasound  Indication: pregnancy " dating    Findings:  BPD 2.83 2.56 2.93   HC 11.35 11.21 10.92  AC 9.23 9.67  FL 1.68 1.64    FHR: 152 BPM  + FM  Grossly normal amniotic fluid      Impression: single intrauterine pregnancy at 15 weeks 1 day    Per ACOG dating guidelines, final YESSICA is 7/8/2023 based on LMP consistent with this 15 week 1 day ultrasound    Ultrasound performed and read by myself and SARAH Raymond CNM.        Assessment:     1. Abnormal uterine bleeding            Plan:   Discussed importance of prenatal vitamins with patient. Encouraged daily use.  Encouraged increase water intake to 3-4 L/day for dizziness and frequent snacking.  Labs to be drawn at NOB.  ER return precautions reviewed to include but not limited to abdominal pain, severe cramping, and vaginal bleeding.  Follow up in 1-2 weeks for NOB visit.       Mojgan Sapp C.N.M.

## 2023-01-17 NOTE — PROGRESS NOTES
DUB visit  LMP: 10/01/2022  WT: 150.8 lb    BP: 112/50  Preferred pharmacy verified with pt.  Pt states has been having N&V and dizziness  Good # [phone]

## 2023-01-19 ENCOUNTER — APPOINTMENT (OUTPATIENT)
Dept: OBGYN | Facility: CLINIC | Age: 28
End: 2023-01-19

## 2023-03-15 ENCOUNTER — HOSPITAL ENCOUNTER (OUTPATIENT)
Facility: MEDICAL CENTER | Age: 28
End: 2023-03-15
Attending: PHYSICIAN ASSISTANT
Payer: COMMERCIAL

## 2023-03-15 ENCOUNTER — INITIAL PRENATAL (OUTPATIENT)
Dept: OBGYN | Facility: CLINIC | Age: 28
End: 2023-03-15
Payer: OTHER GOVERNMENT

## 2023-03-15 ENCOUNTER — TELEPHONE (OUTPATIENT)
Dept: OBGYN | Facility: CLINIC | Age: 28
End: 2023-03-15

## 2023-03-15 VITALS — WEIGHT: 157.8 LBS | SYSTOLIC BLOOD PRESSURE: 92 MMHG | DIASTOLIC BLOOD PRESSURE: 62 MMHG | BODY MASS INDEX: 31.87 KG/M2

## 2023-03-15 DIAGNOSIS — F41.9 ANXIETY: ICD-10-CM

## 2023-03-15 DIAGNOSIS — Z86.32 HISTORY OF DIET CONTROLLED GESTATIONAL DIABETES MELLITUS (GDM): ICD-10-CM

## 2023-03-15 DIAGNOSIS — O09.893 SUPERVISION OF OTHER HIGH RISK PREGNANCIES, THIRD TRIMESTER: ICD-10-CM

## 2023-03-15 DIAGNOSIS — Z98.891 HISTORY OF C-SECTION: ICD-10-CM

## 2023-03-15 DIAGNOSIS — O09.92 SUPERVISION OF HIGH RISK PREGNANCY, ANTEPARTUM, SECOND TRIMESTER: ICD-10-CM

## 2023-03-15 PROBLEM — N93.9 ABNORMAL UTERINE BLEEDING: Status: RESOLVED | Noted: 2023-01-17 | Resolved: 2023-03-15

## 2023-03-15 LAB
APPEARANCE UR: CLEAR
BILIRUB UR STRIP-MCNC: NORMAL MG/DL
COLOR UR AUTO: NORMAL
GLUCOSE UR STRIP.AUTO-MCNC: NEGATIVE MG/DL
KETONES UR STRIP.AUTO-MCNC: NEGATIVE MG/DL
LEUKOCYTE ESTERASE UR QL STRIP.AUTO: NEGATIVE
NITRITE UR QL STRIP.AUTO: NEGATIVE
PH UR STRIP.AUTO: 7 [PH] (ref 5–8)
PROT UR QL STRIP: NORMAL MG/DL
RBC UR QL AUTO: NEGATIVE
SP GR UR STRIP.AUTO: 1.02
UROBILINOGEN UR STRIP-MCNC: NORMAL MG/DL

## 2023-03-15 PROCEDURE — 81002 URINALYSIS NONAUTO W/O SCOPE: CPT | Performed by: PHYSICIAN ASSISTANT

## 2023-03-15 PROCEDURE — 0500F INITIAL PRENATAL CARE VISIT: CPT | Performed by: PHYSICIAN ASSISTANT

## 2023-03-15 RX ORDER — HYDROXYZINE PAMOATE 25 MG/1
25 CAPSULE ORAL 3 TIMES DAILY PRN
Qty: 90 CAPSULE | Refills: 1 | Status: ON HOLD | OUTPATIENT
Start: 2023-03-15 | End: 2023-07-02

## 2023-03-15 ASSESSMENT — LIFESTYLE VARIABLES: SUBSTANCE_ABUSE: 0

## 2023-03-15 ASSESSMENT — ENCOUNTER SYMPTOMS
VOMITING: 0
DEPRESSION: 0
NAUSEA: 0
NERVOUS/ANXIOUS: 1

## 2023-03-15 NOTE — PROGRESS NOTES
Pt. Here for NOB visit.  LMP 10/1/2022  Last pap smear 5/11/2020 WNL  Pt had a DUB on 1/17/2023  Pt. States having + FM, pt states having some nausea in the Am.
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
## 2023-03-16 DIAGNOSIS — O09.893 SUPERVISION OF OTHER HIGH RISK PREGNANCIES, THIRD TRIMESTER: ICD-10-CM

## 2023-03-16 RX ORDER — METRONIDAZOLE 500 MG/1
500 TABLET ORAL 2 TIMES DAILY
Qty: 14 TABLET | Refills: 0 | Status: SHIPPED | OUTPATIENT
Start: 2023-03-16 | End: 2023-03-23

## 2023-03-20 ENCOUNTER — APPOINTMENT (OUTPATIENT)
Dept: RADIOLOGY | Facility: IMAGING CENTER | Age: 28
End: 2023-03-20
Attending: PHYSICIAN ASSISTANT

## 2023-03-20 DIAGNOSIS — O09.893 SUPERVISION OF OTHER HIGH RISK PREGNANCIES, THIRD TRIMESTER: ICD-10-CM

## 2023-03-20 PROCEDURE — 76805 OB US >/= 14 WKS SNGL FETUS: CPT | Mod: TC | Performed by: RADIOLOGY

## 2023-04-10 ENCOUNTER — HOSPITAL ENCOUNTER (OUTPATIENT)
Dept: LAB | Facility: MEDICAL CENTER | Age: 28
End: 2023-04-10
Attending: PHYSICIAN ASSISTANT
Payer: COMMERCIAL

## 2023-04-10 DIAGNOSIS — O09.893 SUPERVISION OF OTHER HIGH RISK PREGNANCIES, THIRD TRIMESTER: ICD-10-CM

## 2023-04-10 LAB
ABO GROUP BLD: NORMAL
BLD GP AB SCN SERPL QL: NORMAL
ERYTHROCYTE [DISTWIDTH] IN BLOOD BY AUTOMATED COUNT: 42.8 FL (ref 35.9–50)
GLUCOSE 1H P 50 G GLC PO SERPL-MCNC: 151 MG/DL (ref 70–139)
HBV SURFACE AG SER QL: ABNORMAL
HCT VFR BLD AUTO: 39.5 % (ref 37–47)
HCV AB SER QL: ABNORMAL
HGB BLD-MCNC: 13.1 G/DL (ref 12–16)
HIV 1+2 AB+HIV1 P24 AG SERPL QL IA: NORMAL
MCH RBC QN AUTO: 29.8 PG (ref 27–33)
MCHC RBC AUTO-ENTMCNC: 33.2 G/DL (ref 33.6–35)
MCV RBC AUTO: 90 FL (ref 81.4–97.8)
PLATELET # BLD AUTO: 245 K/UL (ref 164–446)
PMV BLD AUTO: 11.1 FL (ref 9–12.9)
RBC # BLD AUTO: 4.39 M/UL (ref 4.2–5.4)
RH BLD: NORMAL
RUBV AB SER QL: 15.8 IU/ML
T PALLIDUM AB SER QL IA: ABNORMAL
WBC # BLD AUTO: 8.7 K/UL (ref 4.8–10.8)

## 2023-04-12 ENCOUNTER — ROUTINE PRENATAL (OUTPATIENT)
Dept: OBGYN | Facility: CLINIC | Age: 28
End: 2023-04-12

## 2023-04-12 VITALS — SYSTOLIC BLOOD PRESSURE: 112 MMHG | BODY MASS INDEX: 32.76 KG/M2 | WEIGHT: 162.2 LBS | DIASTOLIC BLOOD PRESSURE: 50 MMHG
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
## 2023-04-12 DIAGNOSIS — O09.92 SUPERVISION OF HIGH RISK PREGNANCY, ANTEPARTUM, SECOND TRIMESTER: ICD-10-CM

## 2023-04-12 DIAGNOSIS — F41.9 ANXIETY: ICD-10-CM

## 2023-04-12 DIAGNOSIS — R73.09 ELEVATED GLUCOSE TOLERANCE TEST: ICD-10-CM

## 2023-04-12 LAB
AMPHET CTO UR CFM-MCNC: NEGATIVE NG/ML
BACTERIA UR CULT: NORMAL
BARBITURATES CTO UR CFM-MCNC: NEGATIVE NG/ML
BENZODIAZ CTO UR CFM-MCNC: NEGATIVE NG/ML
CANNABINOIDS CTO UR CFM-MCNC: NEGATIVE NG/ML
COCAINE CTO UR CFM-MCNC: NEGATIVE NG/ML
DRUG COMMENT 753798: NORMAL
METHADONE CTO UR CFM-MCNC: NEGATIVE NG/ML
OPIATES CTO UR CFM-MCNC: NEGATIVE NG/ML
PCP CTO UR CFM-MCNC: NEGATIVE NG/ML
PROPOXYPH CTO UR CFM-MCNC: NEGATIVE NG/ML
SIGNIFICANT IND 70042: NORMAL
SITE SITE: NORMAL
SOURCE SOURCE: NORMAL

## 2023-04-12 PROCEDURE — 90715 TDAP VACCINE 7 YRS/> IM: CPT | Performed by: PHYSICIAN ASSISTANT

## 2023-04-12 PROCEDURE — 90471 IMMUNIZATION ADMIN: CPT | Performed by: PHYSICIAN ASSISTANT

## 2023-04-12 PROCEDURE — 0502F SUBSEQUENT PRENATAL CARE: CPT | Performed by: PHYSICIAN ASSISTANT

## 2023-04-12 NOTE — PROGRESS NOTES
Pt here today for OB follow up  Pt states she wanted to know if is normal to get pain and soreness in between thighs and and lower abdomen. She states she's getting pain on lower back. Pt confirms FM  Denies LOF, VB, cramps and contractions  Tdap desired and will be given today  K/C given and instructed  BTL signed today  Reports +FM

## 2023-04-12 NOTE — LETTER
"Count Your Baby's Movements  Another step to a healthy delivery    A Epic Dress Re Test             Dept: [phone]    How Many Weeks Pregnant=27w4d    Date to Begin Countin/15/23              How to use this chart    One way for your physician to keep track of your baby's health is by knowing how often the baby moves (or \"kicks\") in your womb.  You can help your physician to do this by using this chart every day.    Every day, you should see how many hours it takes for your baby to move 10 times.  Start in the morning, as soon as you get up.    First, write down the time your baby moves until you get to 10.  Check off one box every time your baby moves until you get to 10.  Write down the time you finished counting in the last column.  Total how long it took to count up all 10 movements.  Finally, fill in the box that shows how long this took.  After counting 10 movements, you no longer have to count any more that day.  The next morning, just start counting again as soon as you get up.    What should you call a \"movement\"?  It is hard to say, because it will feel different from one mother to another and from one pregnancy to the next.  The important thing is that you count the movements the same way throughout your pregnancy.  If you have more questions, you should ask your physician.    Count carefully every day!  SAMPLE:  Week 28    How many hours did it take to feel 10 movements?       Start  Time     1     2     3     4     5     6     7     8     9     10   Finish Time   Mon 8:20           11:40                  Fri               Sat               Sun                 IMPORTANT: You should contact your physician if it takes more than two hours for you to feel 10 movements.  Each morning, write down the time and start to count the movements of your baby.  Keep track by checking off one box every time you feel one movement.  When you have felt 10 \"kicks\", write down " the time you finished counting in the last column.  Then fill in the   box (over the check jacquelyn) for the number of hours it took.  Be sure to read the complete instructions on the previous page.

## 2023-04-12 NOTE — PROGRESS NOTES
S: 27 y.o.  at 27w4d presents for routine obstetric follow-up.   Good fetal movement.  No contractions, vaginal bleeding, or leakage of fluid.    Questions answered.    Anxiety: on wait list to get in with [facility] therapy, would like referral to kanwal montanez sooner elsewhere. Has not been taking hydroxyzine as she has been walking which helps.     O: LMP 10/01/2022 (Exact Date)   Patients' weight gain, fluid intake and exercise level discussed.  Vitals, fundal height , fetal position, and FHR reviewed on flowsheet      Recent US: 3/20 Anatomy scan  TRUE: 17cm. Cervical length: 3.6cm. EFW: 65%.  TDaP: Administered today   Rh: positive  BTL: consent signed today     A/P:  27 y.o.  at 27w4d presents for routine obstetric follow-up.  Size equals dates and/or scan    - Referral to therapy   - Reviewed PNL WNL, 1h GTT elevated, 3h GTT ordered - reviewed how this test is done and need to fast, will schedule ASAP  - Continue prenatal vitamins- pills not gummies.  - Fetal kick counts sheet given today  - Exercise at least 30 minutes daily. Drink at least 3-4L of water daily  - PTL precautions educated.    Follow-up in 2 weeks.    Amber Richard P.A.-C.

## 2023-04-25 ENCOUNTER — HOSPITAL ENCOUNTER (EMERGENCY)
Facility: MEDICAL CENTER | Age: 28
End: 2023-04-25
Attending: OBSTETRICS & GYNECOLOGY | Admitting: OBSTETRICS & GYNECOLOGY

## 2023-04-25 ENCOUNTER — TELEPHONE (OUTPATIENT)
Dept: OBGYN | Facility: CLINIC | Age: 28
End: 2023-04-25

## 2023-04-25 VITALS
DIASTOLIC BLOOD PRESSURE: 61 MMHG | TEMPERATURE: 98.5 F | WEIGHT: 162 LBS | SYSTOLIC BLOOD PRESSURE: 113 MMHG | HEART RATE: 74 BPM | RESPIRATION RATE: 18 BRPM | OXYGEN SATURATION: 97 % | HEIGHT: 59 IN | BODY MASS INDEX: 32.66 KG/M2

## 2023-04-25 LAB
APPEARANCE UR: ABNORMAL
COLOR UR AUTO: YELLOW
GLUCOSE UR QL STRIP.AUTO: 250 MG/DL
KETONES UR QL STRIP.AUTO: ABNORMAL MG/DL
LEUKOCYTE ESTERASE UR QL STRIP.AUTO: NEGATIVE
NITRITE UR QL STRIP.AUTO: NEGATIVE
PH UR STRIP.AUTO: 6.5 [PH] (ref 5–8)
PROT UR QL STRIP: ABNORMAL MG/DL
RBC UR QL AUTO: NEGATIVE
SP GR UR STRIP.AUTO: 1.02 (ref 1–1.03)

## 2023-04-25 PROCEDURE — 99282 EMERGENCY DEPT VISIT SF MDM: CPT

## 2023-04-25 PROCEDURE — 59025 FETAL NON-STRESS TEST: CPT

## 2023-04-25 PROCEDURE — 59025 FETAL NON-STRESS TEST: CPT | Mod: 26 | Performed by: OBSTETRICS & GYNECOLOGY

## 2023-04-25 PROCEDURE — 99282 EMERGENCY DEPT VISIT SF MDM: CPT | Mod: GC,25 | Performed by: OBSTETRICS & GYNECOLOGY

## 2023-04-25 PROCEDURE — 81002 URINALYSIS NONAUTO W/O SCOPE: CPT

## 2023-04-25 ASSESSMENT — PAIN SCALES - GENERAL: PAINLEVEL: 2
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
## 2023-04-25 NOTE — PROGRESS NOTES
27 y.o  EDC 23 (29w3d)    Pt presents to Labor and Delivery with c/o pressure in her pelvis/back since , pain in her ribs and back, and increased dizziness/malaise. Pt states +FM, denies VB/ctx and has had an increased in discharge but denies LOF like she's ruptured. Monitors applied x2. VSS. POC discussed.

## 2023-04-25 NOTE — PROGRESS NOTES
1340 Assumed care of patient    1350 RN at bedside, pt drinking water currently to provide urine sample. Pt does report having intercourse this morning.     1402 Pt off monitor to give urine sample    1425 Dr. Garcia and Dr. Georges at bedside, POC discussed.     1428 RN at bedside,  labor precautions given and all questions answered.     1434 Pt discharged home in stable condition.

## 2023-04-25 NOTE — TELEPHONE ENCOUNTER
Pt called in and states that she has been having heavy cramps since Friday 4/21/2023 but for on and off. Pt denies any contractions, vaginal bleeding and/or any leaking of fluid. Pt denies any headaches, changes in vision, but has been feeling dizzy. Also, Pt states that she has been feeling numbness/swelling for both hands and legs. Pt states she has been drinking plenty of water. After consulting with Dr. Ruiz, advise Pt that she need to go to L&D as soon as she can for further evaluation. Pt verbalized understanding and has no further concerns/questions.

## 2023-04-27 ENCOUNTER — ROUTINE PRENATAL (OUTPATIENT)
Dept: OBGYN | Facility: CLINIC | Age: 28
End: 2023-04-27
Payer: OTHER GOVERNMENT

## 2023-04-27 VITALS — WEIGHT: 164 LBS | SYSTOLIC BLOOD PRESSURE: 98 MMHG | DIASTOLIC BLOOD PRESSURE: 58 MMHG | BODY MASS INDEX: 33.12 KG/M2

## 2023-04-27 DIAGNOSIS — Z34.83 ENCOUNTER FOR SUPERVISION OF OTHER NORMAL PREGNANCY IN THIRD TRIMESTER: Primary | ICD-10-CM

## 2023-04-27 PROCEDURE — 0502F SUBSEQUENT PRENATAL CARE: CPT | Performed by: NURSE PRACTITIONER

## 2023-04-27 NOTE — PROGRESS NOTES
OB follow up   + fetal movement.  No VB, LOF or UC's.  Phone # [phone]  Preferred pharmacy confirmed.  3 GTT not done yet. Had appt 5/4/23

## 2023-05-01 ENCOUNTER — HOSPITAL ENCOUNTER (EMERGENCY)
Facility: MEDICAL CENTER | Age: 28
End: 2023-05-01
Attending: OBSTETRICS & GYNECOLOGY | Admitting: OBSTETRICS & GYNECOLOGY

## 2023-05-01 VITALS
TEMPERATURE: 98.1 F | HEIGHT: 59 IN | BODY MASS INDEX: 33.06 KG/M2 | SYSTOLIC BLOOD PRESSURE: 100 MMHG | OXYGEN SATURATION: 99 % | WEIGHT: 164 LBS | DIASTOLIC BLOOD PRESSURE: 50 MMHG | HEART RATE: 69 BPM | RESPIRATION RATE: 16 BRPM

## 2023-05-01 PROCEDURE — 99282 EMERGENCY DEPT VISIT SF MDM: CPT | Performed by: ADVANCED PRACTICE MIDWIFE

## 2023-05-01 PROCEDURE — 99284 EMERGENCY DEPT VISIT MOD MDM: CPT

## 2023-05-01 PROCEDURE — 59025 FETAL NON-STRESS TEST: CPT

## 2023-05-01 ASSESSMENT — ENCOUNTER SYMPTOMS
NEUROLOGICAL NEGATIVE: 1
GASTROINTESTINAL NEGATIVE: 1
CARDIOVASCULAR NEGATIVE: 1
BACK PAIN: 1
RESPIRATORY NEGATIVE: 1
CONSTITUTIONAL NEGATIVE: 1

## 2023-05-01 ASSESSMENT — PAIN SCALES - GENERAL: PAINLEVEL: 6
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
## 2023-05-01 NOTE — PROGRESS NOTES
Pt is a  Repeat section x2 at 30.2 weeks gestation. Presents to L&D for c/o cramping and back pain since . Placed on monitor, VS obtained. Bhavin Raymond CNM at bedside

## 2023-05-01 NOTE — PROGRESS NOTES
Oral hydration provided to patient. Drank almost a full pitcher of water. Feels contractions are less then at home. Pt OK for discharge. Discharge instructions reviewed with patient and sig other. Voices understanding to all. Has lab work appt this week and scheduled office visit next week. Encouraged to keep both appts.  Work note supplied to sig other. Pt discharged home ambulatory at this time

## 2023-05-04 ENCOUNTER — HOSPITAL ENCOUNTER (OUTPATIENT)
Dept: LAB | Facility: MEDICAL CENTER | Age: 28
End: 2023-05-04
Attending: PHYSICIAN ASSISTANT
Payer: COMMERCIAL

## 2023-05-04 DIAGNOSIS — R73.09 ELEVATED GLUCOSE TOLERANCE TEST: ICD-10-CM

## 2023-05-04 LAB
GLUCOSE 1H P CHAL SERPL-MCNC: 189 MG/DL (ref 65–180)
GLUCOSE 2H P CHAL SERPL-MCNC: 160 MG/DL (ref 65–155)
GLUCOSE 3H P CHAL SERPL-MCNC: 132 MG/DL (ref 65–140)
GLUCOSE BS SERPL-MCNC: 79 MG/DL (ref 65–95)

## 2023-05-08 ENCOUNTER — TELEPHONE (OUTPATIENT)
Dept: OBGYN | Facility: CLINIC | Age: 28
End: 2023-05-08

## 2023-05-08 DIAGNOSIS — O24.410 DIET CONTROLLED GESTATIONAL DIABETES MELLITUS (GDM) IN THIRD TRIMESTER: ICD-10-CM

## 2023-05-08 PROBLEM — R73.09 ELEVATED GLUCOSE TOLERANCE TEST: Status: RESOLVED | Noted: 2023-04-12 | Resolved: 2023-05-08

## 2023-05-08 PROBLEM — O24.419 GDM (GESTATIONAL DIABETES MELLITUS): Status: ACTIVE | Noted: 2023-05-08

## 2023-05-08 NOTE — TELEPHONE ENCOUNTER
----- Message from CHARI Riggs sent at 5/8/2023  7:24 AM PDT -----  Pt has GDM - please refer to GDM clinic, get supplies (no insurance) and HgA1C, also needs diabetic diet education.       05/08/23  1348 Left message for pt to call back regarding lab results.   05/12/23  Per provider's notes pt notified of results pt notified of test results.   1420 called pt and confirmed she did received the information on how to get her glucose meter and the rest of the supplies. Advised pt to eat breakfast before class and informed she can bring one adult with her but no children. Pt aware this class is in [place]. Pt verbalized understanding.

## 2023-05-11 ENCOUNTER — HOSPITAL ENCOUNTER (OUTPATIENT)
Dept: LAB | Facility: MEDICAL CENTER | Age: 28
End: 2023-05-11
Attending: PHYSICIAN ASSISTANT
Payer: COMMERCIAL

## 2023-05-11 ENCOUNTER — ROUTINE PRENATAL (OUTPATIENT)
Dept: OBGYN | Facility: CLINIC | Age: 28
End: 2023-05-11
Payer: OTHER GOVERNMENT

## 2023-05-11 VITALS — WEIGHT: 167.4 LBS | BODY MASS INDEX: 33.81 KG/M2 | DIASTOLIC BLOOD PRESSURE: 60 MMHG | SYSTOLIC BLOOD PRESSURE: 102 MMHG

## 2023-05-11 DIAGNOSIS — O24.419 GESTATIONAL DIABETES MELLITUS (GDM) IN THIRD TRIMESTER, GESTATIONAL DIABETES METHOD OF CONTROL UNSPECIFIED: ICD-10-CM

## 2023-05-11 DIAGNOSIS — O24.410 DIET CONTROLLED GESTATIONAL DIABETES MELLITUS (GDM) IN THIRD TRIMESTER: ICD-10-CM

## 2023-05-11 DIAGNOSIS — O09.93 SUPERVISION OF HIGH-RISK PREGNANCY, THIRD TRIMESTER: ICD-10-CM

## 2023-05-11 DIAGNOSIS — Z98.891 HISTORY OF C-SECTION: ICD-10-CM

## 2023-05-11 LAB
EST. AVERAGE GLUCOSE BLD GHB EST-MCNC: 120 MG/DL
HBA1C MFR BLD: 5.8 % (ref 4–5.6)

## 2023-05-11 PROCEDURE — 0502F SUBSEQUENT PRENATAL CARE: CPT

## 2023-05-11 PROCEDURE — 3074F SYST BP LT 130 MM HG: CPT

## 2023-05-11 PROCEDURE — 3078F DIAST BP <80 MM HG: CPT

## 2023-05-11 RX ORDER — FAMOTIDINE 20 MG/1
20 TABLET, FILM COATED ORAL NIGHTLY
Qty: 30 TABLET | Refills: 3 | Status: ON HOLD | OUTPATIENT
Start: 2023-05-11 | End: 2023-07-02
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
## 2023-05-11 NOTE — PROGRESS NOTES
Pt here today for OB follow up  Reports +FM  WT: 167.4 lb  BP: 102/60  Preferred pharmacy verified with pt.  MFM Appointment: not at this time  Pt states she has been having UC's that come and go. States no other complaints today  Pt informed of 3 hr gtt results and informed she needs to attend a Diabetes class next week following Thursdays for Diabetic clinic.   Jaime # [phone]

## 2023-05-16 ENCOUNTER — NON-PROVIDER VISIT (OUTPATIENT)
Dept: OBGYN | Facility: CLINIC | Age: 28
End: 2023-05-16

## 2023-05-16 VITALS
HEART RATE: 57 BPM | SYSTOLIC BLOOD PRESSURE: 115 MMHG | DIASTOLIC BLOOD PRESSURE: 66 MMHG | WEIGHT: 166 LBS | BODY MASS INDEX: 34.85 KG/M2 | HEIGHT: 58 IN

## 2023-05-16 DIAGNOSIS — O24.419 GESTATIONAL DIABETES MELLITUS (GDM) IN THIRD TRIMESTER, GESTATIONAL DIABETES METHOD OF CONTROL UNSPECIFIED: ICD-10-CM

## 2023-05-16 PROCEDURE — G0109 DIAB MANAGE TRN IND/GROUP: HCPCS

## 2023-05-16 NOTE — PROGRESS NOTES
Johanny, attended Malay Gestational Diabetes class. Pt has a hx of GDM with previous pregnancy. Pt brought in and use ws reviewed for a Reli On Platinum meter. Return demo done with finger stick of 100, 13 hours pp. Pt walks 5 days a week.  Pt was given a 2000 venessa GDM meal plan, reviewed by Elissa HOPSON who will add the times for eating to her note and scan meal plan to chart.  Discussed what she needs to do after delivery for herself and family to limit risk for type two diabetes. All education and handouts given in Malay.

## 2023-05-16 NOTE — PROGRESS NOTES
Pt here for GDM class-Ukrainian. Discuss with pt sources of simple sugars, sources of complex carbs, Carb portions, Protains and fats, plate distribution.  The pt received a 2000 calorie meal plan base on EER formula (with verification of Gabrielas EVELIO RN) consisting of 3 meals and 3 snacks (see media for copy of meal plan).   Recommended meal times do to work:   B: 0800  S: 1030  L: 1300  S: 1530  D: 1800  S: 2200  Pt was educated on carbohydrate containing foods vs non carbohydrate containing foods, the importance of small frequent meals, limiting carbohydrate to 1 serving in the morning, no fruit before noon/12pm, and avoiding all concentrated sweets for the remainder of her pregnancy. Explained the importance of not going >3hrs btwn eating during the day and no longer than 10hours overnight. Patient agrees to follow the meal plan and guidelines provided.   All handouts were given in Ukrainian.

## 2023-05-16 NOTE — LETTER
May 16, 2023                   Re: Johanny Hubbard     1995         [id number]       Radha Blankenship CNM      Dear :Radha Blankenship CNM    On 5/16/2023, your patient Johanny Hubbard, received 2 hours of nutrition and diabetes education from the [facility] at [facility] for management of her gestational diabetes.  Her EDC is  : 7/8/23.  We taught the following subjects:    Introduction to gestational diabetes, benefits and responsibilities of patient, physiology of diabetes and the diease process, benefits of blood glucose monitoring and record keeping, medication action and possible side effects, hypoglycemia, sick day management, exercise, stress reduction and travel with diabetes.       Nurse assessment / Education:    Comments:    BP:Blood Pressure: 115/66   Edema:no      Weight:Weight: 75.3 kg (166 lb)         Complaints:no      Pathophysiology of diabetes in pregnancy    Discuss  potential maternal and fetal complications in pregnancy with diabetes.     Importance of blood glucose monitoring   Proper testing technique using a Reli On Platinum meter.    At 1018, the meter read 100, which was 13 after eating.  Testing: fasting and one hour after meals,  expected ranges and rationale for strict control.   Urine ketone testing and rationale    Ketone testing:  At the [facility]   Ketone test today:no       Recognition and treatment of hypoglycemia.     Insulin taught: No  Insulin briefly dicussed at this time.    Should patient require insulin later in pregnancy, she would need further education.     Reviewed fetal kick counts and other tests to determine fetal well-being  Discuss benefits and risks of exercise in pregnancy  Discuss when to call Doctor  Discuss sick day care  Importance of wearing diabetes identification    Johanny, attended Italian Gestational Diabetes class. Pt has a hx of GDM with previous pregnancy. Pt brought in and use ws reviewed for a Reli On Platinum meter.  Return demo done with finger stick of 100, 13 hours pp. Pt walks 5 days a week.  Pt was given a 2000 venessa GDM meal plan, reviewed by Elissa HOPSON who will add the times for eating to her note and scan meal plan to chart.  Discussed what she needs to do after delivery for herself and family to limit risk for type two diabetes. All education and handouts given in Luxembourgish.     Patient/caregiver appeared to understand the content as demonstrated by appropriate questions.     Johanny Hubbard was encouraged to discuss this further with you.    Hopefully this will help in your management of her care.  If we can be of further assistance, please feel free to call.    Thank you for the referral.    Sincerely,    Soha Carmichael RN CDE  Certified Diabetes Educator

## 2023-05-25 ENCOUNTER — ROUTINE PRENATAL (OUTPATIENT)
Dept: OBGYN | Facility: CLINIC | Age: 28
End: 2023-05-25

## 2023-05-25 VITALS — WEIGHT: 168 LBS | BODY MASS INDEX: 35.11 KG/M2 | DIASTOLIC BLOOD PRESSURE: 64 MMHG | SYSTOLIC BLOOD PRESSURE: 100 MMHG

## 2023-05-25 DIAGNOSIS — O24.419 GESTATIONAL DIABETES MELLITUS (GDM) IN THIRD TRIMESTER, GESTATIONAL DIABETES METHOD OF CONTROL UNSPECIFIED: Primary | ICD-10-CM

## 2023-05-25 PROCEDURE — 99213 OFFICE O/P EST LOW 20 MIN: CPT | Performed by: OBSTETRICS & GYNECOLOGY

## 2023-05-25 PROCEDURE — 3074F SYST BP LT 130 MM HG: CPT | Performed by: OBSTETRICS & GYNECOLOGY

## 2023-05-25 PROCEDURE — 3078F DIAST BP <80 MM HG: CPT | Performed by: OBSTETRICS & GYNECOLOGY

## 2023-05-25 NOTE — PROGRESS NOTES
SUBJECTIVE:  Johanny is being seen today for her obstetrical visit.  She is 33 5/7 weeks gestation.  Her obstetrical history is significant for diabetes mellitus, gestational, diet controlled. OBJECTIVE:  On examination today, fundal height is 33, which is normal.   Fetal heart tones are at 150/minute.   Sugars are in optimal control.    ASSESSMENT/PLAN:  1. Intrauterine pregnancy of 33 weeks gestation, with normal growth.  2. Diabetes Mellitus with normal sugar control.  3.  Growth scan at 36 weeks.  4.   with BTL on   5. RTC 1 week.    Julián Owen Jr., M.D.

## 2023-06-08 ENCOUNTER — ROUTINE PRENATAL (OUTPATIENT)
Dept: OBGYN | Facility: CLINIC | Age: 28
End: 2023-06-08

## 2023-06-08 VITALS — SYSTOLIC BLOOD PRESSURE: 108 MMHG | WEIGHT: 170 LBS | DIASTOLIC BLOOD PRESSURE: 68 MMHG | BODY MASS INDEX: 35.53 KG/M2

## 2023-06-08 DIAGNOSIS — O09.93 SUPERVISION OF HIGH-RISK PREGNANCY, THIRD TRIMESTER: ICD-10-CM

## 2023-06-08 PROCEDURE — 0502F SUBSEQUENT PRENATAL CARE: CPT | Performed by: PHYSICIAN ASSISTANT

## 2023-06-08 PROCEDURE — 3078F DIAST BP <80 MM HG: CPT | Performed by: PHYSICIAN ASSISTANT

## 2023-06-08 PROCEDURE — 3074F SYST BP LT 130 MM HG: CPT | Performed by: PHYSICIAN ASSISTANT

## 2023-06-08 NOTE — PROGRESS NOTES
OB f/u GDM. Good # [phone]  Good FM  Pt states has been having cramping that comes and goes and some spotting.   Diabetic supplies none needed today.  C/S on 7/2/2023 @ 0930 am, pt aware.   Pt states will schedule U/S on check out.
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
## 2023-06-13 ENCOUNTER — ROUTINE PRENATAL (OUTPATIENT)
Dept: OBGYN | Facility: CLINIC | Age: 28
End: 2023-06-13

## 2023-06-13 ENCOUNTER — APPOINTMENT (OUTPATIENT)
Dept: RADIOLOGY | Facility: IMAGING CENTER | Age: 28
End: 2023-06-13
Attending: OBSTETRICS & GYNECOLOGY

## 2023-06-13 ENCOUNTER — HOSPITAL ENCOUNTER (OUTPATIENT)
Facility: MEDICAL CENTER | Age: 28
End: 2023-06-13
Attending: NURSE PRACTITIONER

## 2023-06-13 VITALS — SYSTOLIC BLOOD PRESSURE: 102 MMHG | WEIGHT: 171 LBS | DIASTOLIC BLOOD PRESSURE: 60 MMHG | BODY MASS INDEX: 35.74 KG/M2

## 2023-06-13 DIAGNOSIS — O09.93 SUPERVISION OF HIGH-RISK PREGNANCY, THIRD TRIMESTER: Primary | ICD-10-CM

## 2023-06-13 DIAGNOSIS — O24.419 GESTATIONAL DIABETES MELLITUS (GDM) IN THIRD TRIMESTER, GESTATIONAL DIABETES METHOD OF CONTROL UNSPECIFIED: ICD-10-CM

## 2023-06-13 DIAGNOSIS — O09.93 SUPERVISION OF HIGH-RISK PREGNANCY, THIRD TRIMESTER: ICD-10-CM

## 2023-06-13 DIAGNOSIS — Z86.32 HISTORY OF DIET CONTROLLED GESTATIONAL DIABETES MELLITUS (GDM): ICD-10-CM

## 2023-06-13 PROCEDURE — 3074F SYST BP LT 130 MM HG: CPT | Performed by: NURSE PRACTITIONER

## 2023-06-13 PROCEDURE — 87150 DNA/RNA AMPLIFIED PROBE: CPT

## 2023-06-13 PROCEDURE — 76816 OB US FOLLOW-UP PER FETUS: CPT | Mod: TC | Performed by: OBSTETRICS & GYNECOLOGY

## 2023-06-13 PROCEDURE — 76816 OB US FOLLOW-UP PER FETUS: CPT | Performed by: NURSE PRACTITIONER

## 2023-06-13 PROCEDURE — 59025 FETAL NON-STRESS TEST: CPT | Performed by: NURSE PRACTITIONER

## 2023-06-13 PROCEDURE — 87081 CULTURE SCREEN ONLY: CPT

## 2023-06-13 PROCEDURE — 0502F SUBSEQUENT PRENATAL CARE: CPT | Performed by: NURSE PRACTITIONER

## 2023-06-13 PROCEDURE — 3078F DIAST BP <80 MM HG: CPT | Performed by: NURSE PRACTITIONER

## 2023-06-13 NOTE — PROGRESS NOTES
Pt here today for OB follow up. GDM.  Patient forgot BS log.  Pt states no VB, LOF, Uc's. Pt states she get some contractions come and go , left side pain and on pelvic area, pts states she slipped and fell yesterday in the afternoon  Reports decrease FM   Good # [phone]  Pharmacy Confirmed.  Chaperone offered and declined   GBS today   GDM pt   US 6/13/23 .
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
## 2023-06-14 LAB — GP B STREP DNA SPEC QL NAA+PROBE: NEGATIVE

## 2023-06-22 ENCOUNTER — APPOINTMENT (OUTPATIENT)
Dept: RADIOLOGY | Facility: IMAGING CENTER | Age: 28
End: 2023-06-22

## 2023-06-22 ENCOUNTER — NON-PROVIDER VISIT (OUTPATIENT)
Dept: OBGYN | Facility: CLINIC | Age: 28
End: 2023-06-22

## 2023-06-22 ENCOUNTER — ROUTINE PRENATAL (OUTPATIENT)
Dept: OBGYN | Facility: CLINIC | Age: 28
End: 2023-06-22

## 2023-06-22 VITALS — DIASTOLIC BLOOD PRESSURE: 75 MMHG | WEIGHT: 172 LBS | SYSTOLIC BLOOD PRESSURE: 119 MMHG | BODY MASS INDEX: 35.95 KG/M2

## 2023-06-22 DIAGNOSIS — O09.93 SUPERVISION OF HIGH-RISK PREGNANCY, THIRD TRIMESTER: ICD-10-CM

## 2023-06-22 DIAGNOSIS — O40.3XX0 POLYHYDRAMNIOS IN THIRD TRIMESTER COMPLICATION, SINGLE OR UNSPECIFIED FETUS: ICD-10-CM

## 2023-06-22 DIAGNOSIS — Z3A.15 15 WEEKS GESTATION OF PREGNANCY: ICD-10-CM

## 2023-06-22 PROCEDURE — 0502F SUBSEQUENT PRENATAL CARE: CPT | Performed by: PHYSICIAN ASSISTANT

## 2023-06-22 PROCEDURE — 3078F DIAST BP <80 MM HG: CPT | Performed by: PHYSICIAN ASSISTANT

## 2023-06-22 PROCEDURE — 3074F SYST BP LT 130 MM HG: CPT | Performed by: PHYSICIAN ASSISTANT

## 2023-06-22 PROCEDURE — 76815 OB US LIMITED FETUS(S): CPT | Mod: TC | Performed by: PHYSICIAN ASSISTANT

## 2023-06-22 PROCEDURE — 59025 FETAL NON-STRESS TEST: CPT | Performed by: PHYSICIAN ASSISTANT

## 2023-06-22 NOTE — PROGRESS NOTES
OB f/u GDM. Good # [phone]  Good FM  Pt. Denies VB, LOF, or UC's.   Pharmacy verified.  Diabetic supplies none needed today.  Chaperone offered and not indicated  Pt states she is having more lower back pain and what feels like contractions but they last half of a minute
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
## 2023-06-27 ENCOUNTER — APPOINTMENT (OUTPATIENT)
Dept: RADIOLOGY | Facility: IMAGING CENTER | Age: 28
End: 2023-06-27

## 2023-06-27 ENCOUNTER — NON-PROVIDER VISIT (OUTPATIENT)
Dept: OBGYN | Facility: CLINIC | Age: 28
End: 2023-06-27

## 2023-06-27 ENCOUNTER — ROUTINE PRENATAL (OUTPATIENT)
Dept: OBGYN | Facility: CLINIC | Age: 28
End: 2023-06-27

## 2023-06-27 VITALS — DIASTOLIC BLOOD PRESSURE: 75 MMHG | BODY MASS INDEX: 35.74 KG/M2 | SYSTOLIC BLOOD PRESSURE: 116 MMHG | WEIGHT: 171 LBS

## 2023-06-27 DIAGNOSIS — O40.3XX0 POLYHYDRAMNIOS IN THIRD TRIMESTER COMPLICATION, SINGLE OR UNSPECIFIED FETUS: ICD-10-CM

## 2023-06-27 DIAGNOSIS — Z98.891 HISTORY OF C-SECTION: ICD-10-CM

## 2023-06-27 DIAGNOSIS — O24.419 GESTATIONAL DIABETES MELLITUS (GDM) IN THIRD TRIMESTER, GESTATIONAL DIABETES METHOD OF CONTROL UNSPECIFIED: ICD-10-CM

## 2023-06-27 PROCEDURE — 3074F SYST BP LT 130 MM HG: CPT

## 2023-06-27 PROCEDURE — 76815 OB US LIMITED FETUS(S): CPT | Mod: TC

## 2023-06-27 PROCEDURE — 3078F DIAST BP <80 MM HG: CPT

## 2023-06-27 PROCEDURE — 0502F SUBSEQUENT PRENATAL CARE: CPT

## 2023-06-27 PROCEDURE — 59025 FETAL NON-STRESS TEST: CPT

## 2023-06-27 NOTE — PROGRESS NOTES
Pt here today for OB follow up  Negative GBS, pt aware   Reports +FM  WT: 171 lb  BP: 116/75  Preferred pharmacy verified with pt.  MFM Appointment: not at this time  Good # [phone]  Pt states has been having sporadic contractions. States no other complaints or concerns today     Pt is scheduled for Repeat C/S and BTL on 7/2/23 at 09:30 am with Dr. Alvarenga. Pt has been previously informed     Had US on 06/22/2023 for Polyhydramnios     Pt states she changed her mind and does Not want to do the BTL anymore.
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
## 2023-06-28 ENCOUNTER — TELEPHONE (OUTPATIENT)
Dept: OBGYN | Facility: CLINIC | Age: 28
End: 2023-06-28

## 2023-06-29 ENCOUNTER — NON-PROVIDER VISIT (OUTPATIENT)
Dept: OBGYN | Facility: CLINIC | Age: 28
End: 2023-06-29

## 2023-06-29 ENCOUNTER — ROUTINE PRENATAL (OUTPATIENT)
Dept: OBGYN | Facility: CLINIC | Age: 28
End: 2023-06-29

## 2023-06-29 VITALS — SYSTOLIC BLOOD PRESSURE: 115 MMHG | WEIGHT: 172.4 LBS | BODY MASS INDEX: 36.03 KG/M2 | DIASTOLIC BLOOD PRESSURE: 76 MMHG

## 2023-06-29 DIAGNOSIS — O24.419 GESTATIONAL DIABETES MELLITUS (GDM) IN THIRD TRIMESTER, GESTATIONAL DIABETES METHOD OF CONTROL UNSPECIFIED: ICD-10-CM

## 2023-06-29 DIAGNOSIS — O40.3XX0 POLYHYDRAMNIOS IN THIRD TRIMESTER COMPLICATION, SINGLE OR UNSPECIFIED FETUS: ICD-10-CM

## 2023-06-29 LAB
GLUCOSE BLD-MCNC: 96 MG/DL (ref 65–99)
NST ACOUSTIC STIMULATION: NORMAL
NST ACTION NECESSARY: NORMAL
NST ASSESSMENT: NORMAL
NST BASELINE: NORMAL
NST INDICATIONS: NORMAL
NST OTHER DATA: NORMAL
NST READ BY: NORMAL
NST RETURN: NORMAL
NST UTERINE ACTIVITY: NORMAL

## 2023-06-29 PROCEDURE — 82962 GLUCOSE BLOOD TEST: CPT | Performed by: OBSTETRICS & GYNECOLOGY

## 2023-06-29 PROCEDURE — 3078F DIAST BP <80 MM HG: CPT | Performed by: OBSTETRICS & GYNECOLOGY

## 2023-06-29 PROCEDURE — 99213 OFFICE O/P EST LOW 20 MIN: CPT | Performed by: OBSTETRICS & GYNECOLOGY

## 2023-06-29 PROCEDURE — 3074F SYST BP LT 130 MM HG: CPT | Performed by: OBSTETRICS & GYNECOLOGY

## 2023-06-29 NOTE — PROGRESS NOTES
SUBJECTIVE:  Johanny is being seen today for her obstetrical visit.  She is 38 5/7 weeks gestation.  Her obstetrical history is significant for diabetes mellitus, gestational.     OBJECTIVE:  On examination today, fundal height is 39.  Fetal heart tones are at 145/minute.   Sugars at target range.  Recent ultrasonography performed on  showed polyhydramnios.   Estimated fetal weight is 3000 gm on  scan with normal growth. No evidence of macrosomia.    Cervix is closed/50/-3    ASSESSMENT/PLAN:  1. Intrauterine pregnancy of 38 5/7 weeks gestation, with normal growth and poly.  2. Diabetes Mellitus with normal sugar control.  3.  scheduled for 2 days, no BTL      Julián Owen Jr., M.D.

## 2023-06-29 NOTE — TELEPHONE ENCOUNTER
----- Message from Amber Richard P.A.-C. sent at 6/23/2023 12:55 PM PDT -----  Pt was scheduled with Radha next week, She is an M pt, needs to be on Dr Owen's schedule with Thurs or Fri.      Called pt and scheduled her for f/u for tomorrow with Dr. Owen at 0845 and pt has NST after. Pt states she is been having UC every 20min. Pt denies VB, LOF and reports +FM. Advised pt to watch for painful UCs [id number], LOF when she thinks her water broke, VB like a period, or baby is not moving advised pt to go to L&D. Pt agreed and verbalized understanding.

## 2023-06-29 NOTE — PROGRESS NOTES
OB f/u GDM. Good # [phone]  Good FM  Pt states having contractions since yesterday.   Diabetic supplies none needed today.  BS in clinic : 96 Pt states last ate at 9:00 pm

## 2023-07-01 ENCOUNTER — ANESTHESIA EVENT (OUTPATIENT)
Dept: OBGYN | Facility: MEDICAL CENTER | Age: 28
End: 2023-07-01
Payer: MEDICAID

## 2023-07-02 ENCOUNTER — HOSPITAL ENCOUNTER (INPATIENT)
Facility: MEDICAL CENTER | Age: 28
LOS: 4 days | End: 2023-07-06
Attending: STUDENT IN AN ORGANIZED HEALTH CARE EDUCATION/TRAINING PROGRAM | Admitting: STUDENT IN AN ORGANIZED HEALTH CARE EDUCATION/TRAINING PROGRAM
Payer: MEDICAID

## 2023-07-02 ENCOUNTER — ANESTHESIA (OUTPATIENT)
Dept: OBGYN | Facility: MEDICAL CENTER | Age: 28
End: 2023-07-02
Payer: MEDICAID

## 2023-07-02 LAB
BASOPHILS # BLD AUTO: 0.6 % (ref 0–1.8)
BASOPHILS # BLD: 0.04 K/UL (ref 0–0.12)
EOSINOPHIL # BLD AUTO: 0.04 K/UL (ref 0–0.51)
EOSINOPHIL NFR BLD: 0.6 % (ref 0–6.9)
ERYTHROCYTE [DISTWIDTH] IN BLOOD BY AUTOMATED COUNT: 42.3 FL (ref 35.9–50)
ERYTHROCYTE [DISTWIDTH] IN BLOOD BY AUTOMATED COUNT: 42.6 FL (ref 35.9–50)
GLUCOSE BLD STRIP.AUTO-MCNC: 68 MG/DL (ref 65–99)
HCT VFR BLD AUTO: 27.4 % (ref 37–47)
HCT VFR BLD AUTO: 34.5 % (ref 37–47)
HGB BLD-MCNC: 11.7 G/DL (ref 12–16)
HGB BLD-MCNC: 9.5 G/DL (ref 12–16)
HOLDING TUBE BB 8507: NORMAL
IMM GRANULOCYTES # BLD AUTO: 0.04 K/UL (ref 0–0.11)
IMM GRANULOCYTES NFR BLD AUTO: 0.6 % (ref 0–0.9)
LYMPHOCYTES # BLD AUTO: 2.17 K/UL (ref 1–4.8)
LYMPHOCYTES NFR BLD: 34.6 % (ref 22–41)
MCH RBC QN AUTO: 28.9 PG (ref 27–33)
MCH RBC QN AUTO: 29.6 PG (ref 27–33)
MCHC RBC AUTO-ENTMCNC: 33.9 G/DL (ref 32.2–35.5)
MCHC RBC AUTO-ENTMCNC: 34.7 G/DL (ref 32.2–35.5)
MCV RBC AUTO: 85.2 FL (ref 81.4–97.8)
MCV RBC AUTO: 85.4 FL (ref 81.4–97.8)
MONOCYTES # BLD AUTO: 0.52 K/UL (ref 0–0.85)
MONOCYTES NFR BLD AUTO: 8.3 % (ref 0–13.4)
NEUTROPHILS # BLD AUTO: 3.47 K/UL (ref 1.82–7.42)
NEUTROPHILS NFR BLD: 55.3 % (ref 44–72)
NRBC # BLD AUTO: 0 K/UL
NRBC BLD-RTO: 0 /100 WBC (ref 0–0.2)
PLATELET # BLD AUTO: 176 K/UL (ref 164–446)
PLATELET # BLD AUTO: 216 K/UL (ref 164–446)
PMV BLD AUTO: 11.2 FL (ref 9–12.9)
PMV BLD AUTO: 11.6 FL (ref 9–12.9)
RBC # BLD AUTO: 3.21 M/UL (ref 4.2–5.4)
RBC # BLD AUTO: 4.05 M/UL (ref 4.2–5.4)
T PALLIDUM AB SER QL IA: NORMAL
WBC # BLD AUTO: 6.3 K/UL (ref 4.8–10.8)
WBC # BLD AUTO: 8.3 K/UL (ref 4.8–10.8)

## 2023-07-02 PROCEDURE — 160041 HCHG SURGERY MINUTES - EA ADDL 1 MIN LEVEL 4: Performed by: STUDENT IN AN ORGANIZED HEALTH CARE EDUCATION/TRAINING PROGRAM

## 2023-07-02 PROCEDURE — 770002 HCHG ROOM/CARE - OB PRIVATE (112)

## 2023-07-02 PROCEDURE — 700105 HCHG RX REV CODE 258: Performed by: STUDENT IN AN ORGANIZED HEALTH CARE EDUCATION/TRAINING PROGRAM

## 2023-07-02 PROCEDURE — A9270 NON-COVERED ITEM OR SERVICE: HCPCS | Performed by: STUDENT IN AN ORGANIZED HEALTH CARE EDUCATION/TRAINING PROGRAM

## 2023-07-02 PROCEDURE — 36415 COLL VENOUS BLD VENIPUNCTURE: CPT

## 2023-07-02 PROCEDURE — 700111 HCHG RX REV CODE 636 W/ 250 OVERRIDE (IP)

## 2023-07-02 PROCEDURE — 160002 HCHG RECOVERY MINUTES (STAT): Performed by: STUDENT IN AN ORGANIZED HEALTH CARE EDUCATION/TRAINING PROGRAM

## 2023-07-02 PROCEDURE — 700111 HCHG RX REV CODE 636 W/ 250 OVERRIDE (IP): Performed by: STUDENT IN AN ORGANIZED HEALTH CARE EDUCATION/TRAINING PROGRAM

## 2023-07-02 PROCEDURE — 85027 COMPLETE CBC AUTOMATED: CPT

## 2023-07-02 PROCEDURE — 160009 HCHG ANES TIME/MIN: Performed by: STUDENT IN AN ORGANIZED HEALTH CARE EDUCATION/TRAINING PROGRAM

## 2023-07-02 PROCEDURE — 160035 HCHG PACU - 1ST 60 MINS PHASE I: Performed by: STUDENT IN AN ORGANIZED HEALTH CARE EDUCATION/TRAINING PROGRAM

## 2023-07-02 PROCEDURE — 160029 HCHG SURGERY MINUTES - 1ST 30 MINS LEVEL 4: Performed by: STUDENT IN AN ORGANIZED HEALTH CARE EDUCATION/TRAINING PROGRAM

## 2023-07-02 PROCEDURE — 82962 GLUCOSE BLOOD TEST: CPT

## 2023-07-02 PROCEDURE — 01961 ANES CESAREAN DELIVERY ONLY: CPT | Performed by: STUDENT IN AN ORGANIZED HEALTH CARE EDUCATION/TRAINING PROGRAM

## 2023-07-02 PROCEDURE — 700102 HCHG RX REV CODE 250 W/ 637 OVERRIDE(OP): Performed by: STUDENT IN AN ORGANIZED HEALTH CARE EDUCATION/TRAINING PROGRAM

## 2023-07-02 PROCEDURE — 85025 COMPLETE CBC W/AUTO DIFF WBC: CPT

## 2023-07-02 PROCEDURE — 700101 HCHG RX REV CODE 250: Performed by: STUDENT IN AN ORGANIZED HEALTH CARE EDUCATION/TRAINING PROGRAM

## 2023-07-02 PROCEDURE — 59514 CESAREAN DELIVERY ONLY: CPT | Performed by: STUDENT IN AN ORGANIZED HEALTH CARE EDUCATION/TRAINING PROGRAM

## 2023-07-02 PROCEDURE — 160048 HCHG OR STATISTICAL LEVEL 1-5: Performed by: STUDENT IN AN ORGANIZED HEALTH CARE EDUCATION/TRAINING PROGRAM

## 2023-07-02 PROCEDURE — 700111 HCHG RX REV CODE 636 W/ 250 OVERRIDE (IP): Mod: JZ | Performed by: STUDENT IN AN ORGANIZED HEALTH CARE EDUCATION/TRAINING PROGRAM

## 2023-07-02 PROCEDURE — C1755 CATHETER, INTRASPINAL: HCPCS | Performed by: STUDENT IN AN ORGANIZED HEALTH CARE EDUCATION/TRAINING PROGRAM

## 2023-07-02 PROCEDURE — 86780 TREPONEMA PALLIDUM: CPT

## 2023-07-02 RX ORDER — DIPHENHYDRAMINE HYDROCHLORIDE 50 MG/ML
25 INJECTION INTRAMUSCULAR; INTRAVENOUS EVERY 6 HOURS PRN
Status: DISCONTINUED | OUTPATIENT
Start: 2023-07-03 | End: 2023-07-06 | Stop reason: HOSPADM

## 2023-07-02 RX ORDER — ACETAMINOPHEN 500 MG
1000 TABLET ORAL EVERY 6 HOURS PRN
Status: DISCONTINUED | OUTPATIENT
Start: 2023-07-06 | End: 2023-07-06 | Stop reason: HOSPADM

## 2023-07-02 RX ORDER — CEFAZOLIN SODIUM 1 G/3ML
INJECTION, POWDER, FOR SOLUTION INTRAMUSCULAR; INTRAVENOUS PRN
Status: DISCONTINUED | OUTPATIENT
Start: 2023-07-02 | End: 2023-07-02 | Stop reason: SURG

## 2023-07-02 RX ORDER — SODIUM CHLORIDE, SODIUM GLUCONATE, SODIUM ACETATE, POTASSIUM CHLORIDE AND MAGNESIUM CHLORIDE 526; 502; 368; 37; 30 MG/100ML; MG/100ML; MG/100ML; MG/100ML; MG/100ML
1500 INJECTION, SOLUTION INTRAVENOUS ONCE
Status: COMPLETED | OUTPATIENT
Start: 2023-07-02 | End: 2023-07-02

## 2023-07-02 RX ORDER — EPHEDRINE SULFATE 50 MG/ML
10 INJECTION, SOLUTION INTRAVENOUS
Status: ACTIVE | OUTPATIENT
Start: 2023-07-02 | End: 2023-07-03

## 2023-07-02 RX ORDER — SODIUM CHLORIDE, SODIUM LACTATE, POTASSIUM CHLORIDE, CALCIUM CHLORIDE 600; 310; 30; 20 MG/100ML; MG/100ML; MG/100ML; MG/100ML
INJECTION, SOLUTION INTRAVENOUS CONTINUOUS
Status: DISCONTINUED | OUTPATIENT
Start: 2023-07-02 | End: 2023-07-06 | Stop reason: HOSPADM

## 2023-07-02 RX ORDER — IBUPROFEN 800 MG/1
800 TABLET ORAL EVERY 8 HOURS
Status: DISCONTINUED | OUTPATIENT
Start: 2023-07-03 | End: 2023-07-03

## 2023-07-02 RX ORDER — DIPHENHYDRAMINE HYDROCHLORIDE 50 MG/ML
12.5 INJECTION INTRAMUSCULAR; INTRAVENOUS EVERY 6 HOURS PRN
Status: ACTIVE | OUTPATIENT
Start: 2023-07-02 | End: 2023-07-03

## 2023-07-02 RX ORDER — KETOROLAC TROMETHAMINE 30 MG/ML
30 INJECTION, SOLUTION INTRAMUSCULAR; INTRAVENOUS EVERY 6 HOURS
Status: DISCONTINUED | OUTPATIENT
Start: 2023-07-02 | End: 2023-07-03

## 2023-07-02 RX ORDER — METOCLOPRAMIDE HYDROCHLORIDE 5 MG/ML
10 INJECTION INTRAMUSCULAR; INTRAVENOUS ONCE
Status: COMPLETED | OUTPATIENT
Start: 2023-07-02 | End: 2023-07-02

## 2023-07-02 RX ORDER — KETOROLAC TROMETHAMINE 30 MG/ML
INJECTION, SOLUTION INTRAMUSCULAR; INTRAVENOUS PRN
Status: DISCONTINUED | OUTPATIENT
Start: 2023-07-02 | End: 2023-07-02 | Stop reason: SURG

## 2023-07-02 RX ORDER — CITRIC ACID/SODIUM CITRATE 334-500MG
30 SOLUTION, ORAL ORAL ONCE
Status: COMPLETED | OUTPATIENT
Start: 2023-07-02 | End: 2023-07-02

## 2023-07-02 RX ORDER — IBUPROFEN 800 MG/1
800 TABLET ORAL EVERY 8 HOURS PRN
Status: DISCONTINUED | OUTPATIENT
Start: 2023-07-06 | End: 2023-07-03

## 2023-07-02 RX ORDER — HYDROMORPHONE HYDROCHLORIDE 1 MG/ML
0.2 INJECTION, SOLUTION INTRAMUSCULAR; INTRAVENOUS; SUBCUTANEOUS
Status: ACTIVE | OUTPATIENT
Start: 2023-07-02 | End: 2023-07-03

## 2023-07-02 RX ORDER — DIPHENHYDRAMINE HCL 25 MG
25 TABLET ORAL EVERY 6 HOURS PRN
Status: DISCONTINUED | OUTPATIENT
Start: 2023-07-03 | End: 2023-07-06 | Stop reason: HOSPADM

## 2023-07-02 RX ORDER — ONDANSETRON 2 MG/ML
4 INJECTION INTRAMUSCULAR; INTRAVENOUS EVERY 6 HOURS PRN
Status: DISCONTINUED | OUTPATIENT
Start: 2023-07-03 | End: 2023-07-06 | Stop reason: HOSPADM

## 2023-07-02 RX ORDER — MEPERIDINE HYDROCHLORIDE 25 MG/ML
12.5 INJECTION INTRAMUSCULAR; INTRAVENOUS; SUBCUTANEOUS
Status: DISCONTINUED | OUTPATIENT
Start: 2023-07-02 | End: 2023-07-02 | Stop reason: HOSPADM

## 2023-07-02 RX ORDER — ONDANSETRON 2 MG/ML
INJECTION INTRAMUSCULAR; INTRAVENOUS PRN
Status: DISCONTINUED | OUTPATIENT
Start: 2023-07-02 | End: 2023-07-02 | Stop reason: SURG

## 2023-07-02 RX ORDER — ACETAMINOPHEN 500 MG
1000 TABLET ORAL EVERY 6 HOURS
Status: COMPLETED | OUTPATIENT
Start: 2023-07-02 | End: 2023-07-03

## 2023-07-02 RX ORDER — OXYTOCIN 10 [USP'U]/ML
INJECTION, SOLUTION INTRAMUSCULAR; INTRAVENOUS PRN
Status: DISCONTINUED | OUTPATIENT
Start: 2023-07-02 | End: 2023-07-02 | Stop reason: SURG

## 2023-07-02 RX ORDER — ONDANSETRON 2 MG/ML
4 INJECTION INTRAMUSCULAR; INTRAVENOUS
Status: DISCONTINUED | OUTPATIENT
Start: 2023-07-02 | End: 2023-07-02 | Stop reason: HOSPADM

## 2023-07-02 RX ORDER — CEFAZOLIN SODIUM 1 G/3ML
2 INJECTION, POWDER, FOR SOLUTION INTRAMUSCULAR; INTRAVENOUS ONCE
Status: DISCONTINUED | OUTPATIENT
Start: 2023-07-02 | End: 2023-07-02 | Stop reason: ALTCHOICE

## 2023-07-02 RX ORDER — OXYCODONE HCL 5 MG/5 ML
10 SOLUTION, ORAL ORAL
Status: DISCONTINUED | OUTPATIENT
Start: 2023-07-02 | End: 2023-07-02 | Stop reason: HOSPADM

## 2023-07-02 RX ORDER — HYDROMORPHONE HYDROCHLORIDE 1 MG/ML
0.1 INJECTION, SOLUTION INTRAMUSCULAR; INTRAVENOUS; SUBCUTANEOUS
Status: DISCONTINUED | OUTPATIENT
Start: 2023-07-02 | End: 2023-07-02 | Stop reason: HOSPADM

## 2023-07-02 RX ORDER — HYDROMORPHONE HYDROCHLORIDE 1 MG/ML
0.4 INJECTION, SOLUTION INTRAMUSCULAR; INTRAVENOUS; SUBCUTANEOUS
Status: ACTIVE | OUTPATIENT
Start: 2023-07-02 | End: 2023-07-03

## 2023-07-02 RX ORDER — OXYTOCIN 10 [USP'U]/ML
10 INJECTION, SOLUTION INTRAMUSCULAR; INTRAVENOUS
Status: DISCONTINUED | OUTPATIENT
Start: 2023-07-02 | End: 2023-07-06 | Stop reason: HOSPADM

## 2023-07-02 RX ORDER — ACETAMINOPHEN 500 MG
1000 TABLET ORAL EVERY 6 HOURS
Status: DISCONTINUED | OUTPATIENT
Start: 2023-07-03 | End: 2023-07-06 | Stop reason: HOSPADM

## 2023-07-02 RX ORDER — OXYCODONE HYDROCHLORIDE 5 MG/1
5 TABLET ORAL EVERY 4 HOURS PRN
Status: DISCONTINUED | OUTPATIENT
Start: 2023-07-03 | End: 2023-07-06 | Stop reason: HOSPADM

## 2023-07-02 RX ORDER — PHENYLEPHRINE HYDROCHLORIDE 10 MG/ML
INJECTION, SOLUTION INTRAMUSCULAR; INTRAVENOUS; SUBCUTANEOUS PRN
Status: DISCONTINUED | OUTPATIENT
Start: 2023-07-02 | End: 2023-07-02 | Stop reason: SURG

## 2023-07-02 RX ORDER — MORPHINE SULFATE 0.5 MG/ML
INJECTION, SOLUTION EPIDURAL; INTRATHECAL; INTRAVENOUS
Status: COMPLETED | OUTPATIENT
Start: 2023-07-02 | End: 2023-07-02

## 2023-07-02 RX ORDER — BUPIVACAINE HYDROCHLORIDE 7.5 MG/ML
INJECTION, SOLUTION INTRASPINAL
Status: COMPLETED | OUTPATIENT
Start: 2023-07-02 | End: 2023-07-02

## 2023-07-02 RX ORDER — ONDANSETRON 2 MG/ML
4 INJECTION INTRAMUSCULAR; INTRAVENOUS EVERY 6 HOURS PRN
Status: ACTIVE | OUTPATIENT
Start: 2023-07-02 | End: 2023-07-03

## 2023-07-02 RX ORDER — OXYCODONE HYDROCHLORIDE 5 MG/1
5 TABLET ORAL EVERY 4 HOURS PRN
Status: ACTIVE | OUTPATIENT
Start: 2023-07-02 | End: 2023-07-03

## 2023-07-02 RX ORDER — HYDROMORPHONE HYDROCHLORIDE 1 MG/ML
0.2 INJECTION, SOLUTION INTRAMUSCULAR; INTRAVENOUS; SUBCUTANEOUS
Status: DISCONTINUED | OUTPATIENT
Start: 2023-07-02 | End: 2023-07-02 | Stop reason: HOSPADM

## 2023-07-02 RX ORDER — HYDROMORPHONE HYDROCHLORIDE 1 MG/ML
0.4 INJECTION, SOLUTION INTRAMUSCULAR; INTRAVENOUS; SUBCUTANEOUS
Status: DISCONTINUED | OUTPATIENT
Start: 2023-07-02 | End: 2023-07-02 | Stop reason: HOSPADM

## 2023-07-02 RX ORDER — SODIUM CHLORIDE, SODIUM LACTATE, POTASSIUM CHLORIDE, CALCIUM CHLORIDE 600; 310; 30; 20 MG/100ML; MG/100ML; MG/100ML; MG/100ML
INJECTION, SOLUTION INTRAVENOUS ONCE
Status: DISCONTINUED | OUTPATIENT
Start: 2023-07-02 | End: 2023-07-02 | Stop reason: ALTCHOICE

## 2023-07-02 RX ORDER — SODIUM CHLORIDE, SODIUM LACTATE, POTASSIUM CHLORIDE, CALCIUM CHLORIDE 600; 310; 30; 20 MG/100ML; MG/100ML; MG/100ML; MG/100ML
INJECTION, SOLUTION INTRAVENOUS PRN
Status: DISCONTINUED | OUTPATIENT
Start: 2023-07-02 | End: 2023-07-06 | Stop reason: HOSPADM

## 2023-07-02 RX ORDER — DOCUSATE SODIUM 100 MG/1
100 CAPSULE, LIQUID FILLED ORAL 2 TIMES DAILY PRN
Status: DISCONTINUED | OUTPATIENT
Start: 2023-07-02 | End: 2023-07-06 | Stop reason: HOSPADM

## 2023-07-02 RX ORDER — OXYCODONE HYDROCHLORIDE 10 MG/1
10 TABLET ORAL EVERY 4 HOURS PRN
Status: DISCONTINUED | OUTPATIENT
Start: 2023-07-03 | End: 2023-07-06 | Stop reason: HOSPADM

## 2023-07-02 RX ORDER — SODIUM CHLORIDE, SODIUM LACTATE, POTASSIUM CHLORIDE, CALCIUM CHLORIDE 600; 310; 30; 20 MG/100ML; MG/100ML; MG/100ML; MG/100ML
INJECTION, SOLUTION INTRAVENOUS CONTINUOUS
Status: DISCONTINUED | OUTPATIENT
Start: 2023-07-02 | End: 2023-07-02 | Stop reason: ALTCHOICE

## 2023-07-02 RX ORDER — SODIUM CHLORIDE, SODIUM GLUCONATE, SODIUM ACETATE, POTASSIUM CHLORIDE AND MAGNESIUM CHLORIDE 526; 502; 368; 37; 30 MG/100ML; MG/100ML; MG/100ML; MG/100ML; MG/100ML
INJECTION, SOLUTION INTRAVENOUS
Status: DISCONTINUED | OUTPATIENT
Start: 2023-07-02 | End: 2023-07-02 | Stop reason: SURG

## 2023-07-02 RX ORDER — OXYCODONE HYDROCHLORIDE 10 MG/1
10 TABLET ORAL EVERY 4 HOURS PRN
Status: ACTIVE | OUTPATIENT
Start: 2023-07-02 | End: 2023-07-03

## 2023-07-02 RX ORDER — ONDANSETRON 4 MG/1
4 TABLET, ORALLY DISINTEGRATING ORAL EVERY 6 HOURS PRN
Status: DISCONTINUED | OUTPATIENT
Start: 2023-07-03 | End: 2023-07-06 | Stop reason: HOSPADM

## 2023-07-02 RX ORDER — EPHEDRINE SULFATE 50 MG/ML
5 INJECTION, SOLUTION INTRAVENOUS
Status: DISCONTINUED | OUTPATIENT
Start: 2023-07-02 | End: 2023-07-02 | Stop reason: HOSPADM

## 2023-07-02 RX ORDER — OXYCODONE HCL 5 MG/5 ML
5 SOLUTION, ORAL ORAL
Status: DISCONTINUED | OUTPATIENT
Start: 2023-07-02 | End: 2023-07-02 | Stop reason: HOSPADM

## 2023-07-02 RX ADMIN — METOCLOPRAMIDE 10 MG: 5 INJECTION, SOLUTION INTRAMUSCULAR; INTRAVENOUS at 10:09

## 2023-07-02 RX ADMIN — FAMOTIDINE 20 MG: 10 INJECTION INTRAVENOUS at 10:09

## 2023-07-02 RX ADMIN — MORPHINE SULFATE 100 MCG: 0.5 INJECTION, SOLUTION EPIDURAL; INTRATHECAL; INTRAVENOUS at 10:31

## 2023-07-02 RX ADMIN — SODIUM CHLORIDE, SODIUM GLUCONATE, SODIUM ACETATE, POTASSIUM CHLORIDE AND MAGNESIUM CHLORIDE: 526; 502; 368; 37; 30 INJECTION, SOLUTION INTRAVENOUS at 10:21

## 2023-07-02 RX ADMIN — SODIUM CHLORIDE, SODIUM GLUCONATE, SODIUM ACETATE, POTASSIUM CHLORIDE AND MAGNESIUM CHLORIDE 1500 ML: 526; 502; 368; 37; 30 INJECTION, SOLUTION INTRAVENOUS at 08:40

## 2023-07-02 RX ADMIN — CEFAZOLIN 2 G: 1 INJECTION, POWDER, FOR SOLUTION INTRAMUSCULAR; INTRAVENOUS at 10:33

## 2023-07-02 RX ADMIN — ACETAMINOPHEN 1000 MG: 500 TABLET, FILM COATED ORAL at 21:05

## 2023-07-02 RX ADMIN — SODIUM CITRATE AND CITRIC ACID MONOHYDRATE 30 ML: 500; 334 SOLUTION ORAL at 10:14

## 2023-07-02 RX ADMIN — KETOROLAC TROMETHAMINE 15 MG: 30 INJECTION, SOLUTION INTRAMUSCULAR; INTRAVENOUS at 11:42

## 2023-07-02 RX ADMIN — PHENYLEPHRINE HYDROCHLORIDE 100 MCG: 10 INJECTION INTRAVENOUS at 10:48

## 2023-07-02 RX ADMIN — ACETAMINOPHEN 1000 MG: 500 TABLET, FILM COATED ORAL at 16:16

## 2023-07-02 RX ADMIN — BUPIVACAINE HYDROCHLORIDE IN DEXTROSE 1.5 ML: 7.5 INJECTION, SOLUTION SUBARACHNOID at 10:31

## 2023-07-02 RX ADMIN — OXYTOCIN 20 UNITS: 10 INJECTION, SOLUTION INTRAMUSCULAR; INTRAVENOUS at 11:01

## 2023-07-02 RX ADMIN — OXYTOCIN 125 ML/HR: 10 INJECTION, SOLUTION INTRAMUSCULAR; INTRAVENOUS at 12:09

## 2023-07-02 RX ADMIN — PHENYLEPHRINE HYDROCHLORIDE 0.3 MCG/KG/MIN: 10 INJECTION INTRAVENOUS at 10:33

## 2023-07-02 RX ADMIN — FENTANYL CITRATE 10 MCG: 50 INJECTION, SOLUTION INTRAMUSCULAR; INTRAVENOUS at 10:31

## 2023-07-02 RX ADMIN — ONDANSETRON 4 MG: 2 INJECTION INTRAMUSCULAR; INTRAVENOUS at 10:36

## 2023-07-02 RX ADMIN — KETOROLAC TROMETHAMINE 30 MG: 30 INJECTION, SOLUTION INTRAMUSCULAR; INTRAVENOUS at 18:28

## 2023-07-02 ASSESSMENT — PAIN DESCRIPTION - PAIN TYPE
TYPE: SURGICAL PAIN
TYPE: SURGICAL PAIN
TYPE: ACUTE PAIN
TYPE: SURGICAL PAIN
TYPE: SURGICAL PAIN
TYPE: ACUTE PAIN

## 2023-07-02 ASSESSMENT — PATIENT HEALTH QUESTIONNAIRE - PHQ9
1. LITTLE INTEREST OR PLEASURE IN DOING THINGS: NOT AT ALL
SUM OF ALL RESPONSES TO PHQ9 QUESTIONS 1 AND 2: 0
2. FEELING DOWN, DEPRESSED, IRRITABLE, OR HOPELESS: NOT AT ALL

## 2023-07-02 ASSESSMENT — PAIN SCALES - GENERAL: PAINLEVEL: 0 - NO PAIN

## 2023-07-02 ASSESSMENT — LIFESTYLE VARIABLES: EVER_SMOKED: NEVER

## 2023-07-02 NOTE — ANESTHESIA TIME REPORT
Anesthesia Start and Stop Event Times     Date Time Event    7/2/2023 1017 Ready for Procedure     1021 Anesthesia Start     1158 Anesthesia Stop        Responsible Staff  07/02/23    Name Role Begin End    Fermin Haddad M.D. Anesth 1021 1158        Overtime Reason:  no overtime (within assigned shift)    Comments:

## 2023-07-02 NOTE — ANESTHESIA PREPROCEDURE EVALUATION
Case: [id number] Date/Time: 23    Procedure: REPEAT  SECTION, BILATERAL TUBAL LIGATION    Pre-op diagnosis: PREVIOUS  SECTION, STERILIZATION - 39.1 WEEKS    Location: [facility] OR  / SURGERY LABOR AND DELIVERY    Surgeons: Wendi Alvarenga M.D.        26 yo  w/ GDM, scheduled for third C section  Relevant Problems   OB   (positive) GDM (gestational diabetes mellitus)       Physical Exam    Airway   Mallampati: II  TM distance: >3 FB  Neck ROM: full       Cardiovascular - normal exam  Rhythm: regular  Rate: normal  (-) murmur     Dental - normal exam           Pulmonary - normal exam  Breath sounds clear to auscultation     Abdominal    Neurological - normal exam                 Anesthesia Plan    ASA 2       Plan - spinal   Neuraxial block will be primary anesthetic                Postoperative Plan: Postoperative administration of opioids is intended.    Pertinent diagnostic labs and testing reviewed    Informed Consent:    Anesthetic plan and risks discussed with patient.

## 2023-07-02 NOTE — PROGRESS NOTES
0730- Pt is a  at 39.1 weeks gestation presenting for a scheduled repeat  section. EFM/TOCO applied, POC discussed. Pt reports +FM, occasional non-painful contractions, denies LOF, VB, HA/vision changes/RUQ pain or n/v.     1320- Transported to PP unit in stable condition with infant in arms, personal belongings and SO at side.   Bedside report given to Yolande MONTEMAYOR, POC discussed, care relinquished. ID bands verified with 2 nurses. Pt denies any further needs at this time.

## 2023-07-02 NOTE — ANESTHESIA PROCEDURE NOTES
Spinal Block    Date/Time: 7/2/2023 10:31 AM    Performed by: Fermin Haddad M.D.  Authorized by: Fermin Haddad M.D.    Patient Location:  OR  Start Time:  7/2/2023 10:31 AM  End Time:  7/2/2023 10:31 AM  Reason for Block: primary anesthetic    patient identified, IV checked, site marked, risks and benefits discussed, surgical consent, monitors and equipment checked, pre-op evaluation and timeout performed    Patient Position:  Sitting  Prep: ChloraPrep, patient draped and sterile technique    Monitoring:  Blood pressure, continuous pulse oximetry and heart rate  Approach:  Midline  Location:  L3-4  Injection Technique:  Single-shot  Skin infiltration:  Lidocaine  Strength:  1%  Dose:  3ml  Needle Type:  Pencan  Needle Gauge:  25 G  CSF flowing pre/post injection:  Yes  Sensory Level:  T4

## 2023-07-02 NOTE — ANESTHESIA POSTPROCEDURE EVALUATION
Patient: Johanny Hubbard    Procedure Summary     Date: 23 Room / Location: [facility] OR 01 / SURGERY LABOR AND DELIVERY    Anesthesia Start: 1021 Anesthesia Stop: 1158    Procedure:  SECTION, REPEAT (Bilateral: Abdomen) Diagnosis:        delivery, delivered, current hospitalization      (repeat  section 39.1 weeks gestation)    Surgeons: Wendi Alvarenga M.D. Responsible Provider: Fermin Haddad M.D.    Anesthesia Type: spinal ASA Status: 2          Final Anesthesia Type: spinal  Last vitals  BP   Blood Pressure: 129/81    Temp   36.1 °C (96.9 °F)    Pulse   72   Resp   18    SpO2   98 %      Anesthesia Post Evaluation    Patient location during evaluation: PACU  Patient participation: complete - patient participated  Level of consciousness: awake and alert    Airway patency: patent  Anesthetic complications: no  Cardiovascular status: hemodynamically stable  Respiratory status: acceptable  Hydration status: euvolemic    PONV: none          No notable events documented.     Nurse Pain Score: 0 (NPRS)

## 2023-07-02 NOTE — CARE PLAN
The patient is Stable - Low risk of patient condition declining or worsening    Shift Goals  Clinical Goals: repeat  section  Patient Goals: healthy mom and healthy baby  Family Goals: support    Progress made toward(s) clinical / shift goals:  successful  delivery of a viable infant male, APGARS assigned.     Patient is not progressing towards the following goals: n/a      Problem: Pain  Goal: Patient's pain will be alleviated or reduced to the patient’s comfort goal  Outcome: Progressing  Note: Pain controlled with spinal analgesia during procedure     Problem: Knowledge Deficit - L&D  Goal: Patient and family/caregivers will demonstrate understanding of plan of care, disease process/condition, diagnostic tests and medications  Outcome: Met  Note: Education about procedure and POC for preop given, pt verbalized understanding and denies any questions     Problem: Knowledge Deficit -   Goal: Patient/support person will demonstrate understanding regarding  section  Outcome: Met

## 2023-07-02 NOTE — OP REPORT
DATE OF SERVICE:  2023      PREOPERATIVE DIAGNOSES:  1.  Intrauterine pregnancy at 39w1d  2.  History of prior CS, desires repeat  3.  Polyhydramnios  4.  A1GDM     POSTOPERATIVE DIAGNOSES:  1.  Intrauterine pregnancy at 39w1d  2.  same    PROCEDURE PERFORMED:  Repeat low transverse  section     SURGEON:  Wendi Alvarenga M.D.      ASSISTANT: Monalisa Nettles DO     ANESTHESIA:  Spinal     ANESTHESIOLOGIST:  Fermin Haddad MD     SPECIMEN:  none     ESTIMATED BLOOD LOSS:  600 mL     FINDINGS:  A viable mal infant, weight pending, Apgars of 8 and 9 in vertex presentation with clear amniotic fluid.  There was a normal uterus, tubes, and ovaries bilaterally.     COMPLICATIONS:  None.     PROCEDURE:  After appropriate consents were obtained, the patient was taken to the operating room where spinal anesthesia was applied without complications. The patient was then prepped and draped in the usual sterile manner. Clamp test on the skin verified adequate anesthesia.  A Pfannenstiel incision was made with a scalpel 3cm superior to the pubic symphysis and extended down to the rectus fascia. The rectus fascia was incised with the scalpel and tented up. The underlying rectus muscle was  from the fascia first inferiorly and then superiorly using the jacobs scissors. The rectus muscle was  bluntly in the midline, and extended with the bovie due to scar tissue. The peritoneum was entered bluntly in the midline. The peritoneum incision was extended superiorly and inferiorly with blunt and sharp dissection with great care to avoid injury to underlying bowel or bladder. Derrick retractor was placed. The vesicouterine peritoneum was tented up and a bladder flap was created digitally.  An incision was made into the lower uterine segment transversely and the incision was extended bluntly.  Amniotomy was performed and there was noted to be clear amniotic fluid. The Infant's head was grasped and delivered  atraumatically followed by the remainder of the body without any complications. The cord was doubly clamped and cut and the infant was handed off to awaiting neonatology team. The placenta was then allowed to spontaneously deliver. The uterus was cleared of clots and debris.  The hysterotomy incision was reapproximated with 1-0 Vicryl suture in a running locked fashion. A second layer of the same suture was placed to imbricate the incision creating a 2 layer closure. Multiple figure of eight sutures were placed for hemostasis along the hysterotomy. The tubes and ovaries were examined and noted to be normal. The pericolic gutters were examined and any blood clots were removed. The Derrick retractor was removed. The rectus muscles were examined and hemostatic. The fascia was reapproximated with 0 Vicryl suture running. The subcutaneous fat was irrigated and any small bleeders were bovied for hemostasis.  The subcutaneous fat was then reapproximated with 3-0 chromic suture interrupted.  The skin was reapproximated with 4-0 Monocryl suture running. Dermabond and a dressing were placed.  Sponge, needle, instrument, and lap counts were correct x2.  Patient tolerated the procedure well and went to recovery room in stable condition.       Wendi Alvarenga M.D.

## 2023-07-03 PROCEDURE — A9270 NON-COVERED ITEM OR SERVICE: HCPCS | Performed by: STUDENT IN AN ORGANIZED HEALTH CARE EDUCATION/TRAINING PROGRAM

## 2023-07-03 PROCEDURE — 700102 HCHG RX REV CODE 250 W/ 637 OVERRIDE(OP)

## 2023-07-03 PROCEDURE — 700111 HCHG RX REV CODE 636 W/ 250 OVERRIDE (IP): Mod: JZ | Performed by: STUDENT IN AN ORGANIZED HEALTH CARE EDUCATION/TRAINING PROGRAM

## 2023-07-03 PROCEDURE — 700102 HCHG RX REV CODE 250 W/ 637 OVERRIDE(OP): Performed by: STUDENT IN AN ORGANIZED HEALTH CARE EDUCATION/TRAINING PROGRAM

## 2023-07-03 PROCEDURE — 770002 HCHG ROOM/CARE - OB PRIVATE (112)

## 2023-07-03 PROCEDURE — A9270 NON-COVERED ITEM OR SERVICE: HCPCS

## 2023-07-03 RX ORDER — IBUPROFEN 800 MG/1
800 TABLET ORAL EVERY 8 HOURS PRN
Status: DISCONTINUED | OUTPATIENT
Start: 2023-07-06 | End: 2023-07-06 | Stop reason: HOSPADM

## 2023-07-03 RX ORDER — IBUPROFEN 800 MG/1
800 TABLET ORAL EVERY 8 HOURS
Status: DISCONTINUED | OUTPATIENT
Start: 2023-07-03 | End: 2023-07-06 | Stop reason: HOSPADM

## 2023-07-03 RX ADMIN — ACETAMINOPHEN 1000 MG: 500 TABLET, FILM COATED ORAL at 03:25

## 2023-07-03 RX ADMIN — KETOROLAC TROMETHAMINE 30 MG: 30 INJECTION, SOLUTION INTRAMUSCULAR; INTRAVENOUS at 07:37

## 2023-07-03 RX ADMIN — ACETAMINOPHEN 1000 MG: 500 TABLET, FILM COATED ORAL at 17:59

## 2023-07-03 RX ADMIN — KETOROLAC TROMETHAMINE 30 MG: 30 INJECTION, SOLUTION INTRAMUSCULAR; INTRAVENOUS at 01:36

## 2023-07-03 RX ADMIN — IBUPROFEN 800 MG: 800 TABLET, FILM COATED ORAL at 23:58

## 2023-07-03 RX ADMIN — IBUPROFEN 800 MG: 800 TABLET, FILM COATED ORAL at 16:14

## 2023-07-03 RX ADMIN — ACETAMINOPHEN 1000 MG: 500 TABLET, FILM COATED ORAL at 23:58

## 2023-07-03 RX ADMIN — DOCUSATE SODIUM 100 MG: 100 CAPSULE, LIQUID FILLED ORAL at 22:49

## 2023-07-03 RX ADMIN — ACETAMINOPHEN 1000 MG: 500 TABLET, FILM COATED ORAL at 11:45

## 2023-07-03 ASSESSMENT — EDINBURGH POSTNATAL DEPRESSION SCALE (EPDS)
I HAVE FELT SAD OR MISERABLE: NO, NOT AT ALL
I HAVE BEEN ANXIOUS OR WORRIED FOR NO GOOD REASON: HARDLY EVER
THINGS HAVE BEEN GETTING ON TOP OF ME: YES, SOMETIMES I HAVEN'T BEEN COPING AS WELL AS USUAL
I HAVE BEEN ABLE TO LAUGH AND SEE THE FUNNY SIDE OF THINGS: AS MUCH AS I ALWAYS COULD
I HAVE BLAMED MYSELF UNNECESSARILY WHEN THINGS WENT WRONG: YES, SOME OF THE TIME
I HAVE LOOKED FORWARD WITH ENJOYMENT TO THINGS: AS MUCH AS I EVER DID
I HAVE FELT SCARED OR PANICKY FOR NO GOOD REASON: NO, NOT AT ALL
I HAVE BEEN SO UNHAPPY THAT I HAVE HAD DIFFICULTY SLEEPING: NOT AT ALL
I HAVE BEEN SO UNHAPPY THAT I HAVE BEEN CRYING: NO, NEVER
THE THOUGHT OF HARMING MYSELF HAS OCCURRED TO ME: NEVER

## 2023-07-03 ASSESSMENT — PAIN DESCRIPTION - PAIN TYPE
TYPE: SURGICAL PAIN
TYPE: SURGICAL PAIN
TYPE: SURGICAL PAIN;ACUTE PAIN
TYPE: SURGICAL PAIN
TYPE: ACUTE PAIN;SURGICAL PAIN

## 2023-07-03 NOTE — PROGRESS NOTES
Patient updated on infant's blood glucose. Patient feeding infant 30 mL of formula at this time. Pending MD orders.

## 2023-07-03 NOTE — CARE PLAN
The patient is Stable - Low risk of patient condition declining or worsening    Shift Goals  Clinical Goals: VSS, lochia WNL, pump  Patient Goals: healthy mom and healthy baby  Family Goals: support      Problem: Psychosocial - Postpartum  Goal: Patient will verbalize and demonstrate effective bonding and parenting behavior  Outcome: Progressing  Note: RN provides education and support for pt to bond with baby in NICU. Therapeutic listening in place.      Problem: Altered Physiologic Condition  Goal: Patient physiologically stable as evidenced by normal lochia, palpable uterine involution and vitals within normal limits  Outcome: Progressing  Note: Lochia WNL, fundus is firm and 1 below umbilicus.

## 2023-07-03 NOTE — PROGRESS NOTES
Patient provided pump for her NICU infant. Instructions were reviewed and RN helped her get started. She pumped for 15 min with only small amounts of colostrum in the phalanges.

## 2023-07-03 NOTE — PROGRESS NOTES
Post Partum Progress Note    Name:   Johanny Hubbard   Date/Time:  7/3/2023 - 8:00 AM  Chief Admitting Dx:  Labor and delivery indication for care or intervention [O75.9]  Delivery Type:   for repeat  Post-Op/Post Partum Days #:  1    Subjective:  Abdominal pain: no  Ambulating:   yes  Tolerating liquids:  yes  Tolerating food:  yes common adult  Flatus:   yes  BM:    no  Bleeding:   with a small amount of bleeding  Voiding:   yes  Dizziness:   no  Feeding:   both breast and bottle -    Vitals:    23 0136 23 0200 23 0300 23 0325   BP:  101/61  108/76   Pulse: 87 87 63 71   Resp: 18 18 18 18   Temp:  36.3 °C (97.4 °F)  37.2 °C (98.9 °F)   TempSrc:  Temporal  Temporal   SpO2: 94% 94% 92% 96%   Weight:       Height:           Exam:  Breast: Tenderness no and Engorged no  Abdomen: Abdomen soft, non-tender. BS normal. No masses,  No organomegaly  Fundal Tenderness:  no  Fundus Firm: yes  Incision: no evidence of infection, separation or keloid formation. Bandage in place  Below umbilicus: no  Perineum: perineum intact  Lochia: mild  Extremities: trace extremities, peripheral pulses and reflexes normal    Meds:  Current Facility-Administered Medications   Medication Dose    oxytocin (Pitocin) infusion (for post delivery)  125 mL/hr    oxytocin (Pitocin) injection 10 Units  10 Units    lactated ringers infusion      acetaminophen (Tylenol) tablet 1,000 mg  1,000 mg    ketorolac (Toradol) injection 30 mg  30 mg    oxyCODONE immediate-release (Roxicodone) tablet 5 mg  5 mg    oxyCODONE immediate release (Roxicodone) tablet 10 mg  10 mg    HYDROmorphone (Dilaudid) injection 0.2 mg  0.2 mg    HYDROmorphone (Dilaudid) injection 0.4 mg  0.4 mg    ePHEDrine injection 10 mg  10 mg    ondansetron (Zofran) syringe/vial injection 4 mg  4 mg    diphenhydrAMINE (Benadryl) injection 12.5 mg  12.5 mg    lactated ringers infusion      ibuprofen (Motrin) tablet 800 mg  800 mg    Followed by    [START  ON 2023] ibuprofen (Motrin) tablet 800 mg  800 mg    acetaminophen (Tylenol) tablet 1,000 mg  1,000 mg    Followed by    [START ON 2023] acetaminophen (Tylenol) tablet 1,000 mg  1,000 mg    oxyCODONE immediate-release (Roxicodone) tablet 5 mg  5 mg    oxyCODONE immediate release (Roxicodone) tablet 10 mg  10 mg    ondansetron (Zofran) syringe/vial injection 4 mg  4 mg    Or    ondansetron (Zofran ODT) dispertab 4 mg  4 mg    diphenhydrAMINE (Benadryl) tablet/capsule 25 mg  25 mg    Or    diphenhydrAMINE (Benadryl) injection 25 mg  25 mg    docusate sodium (Colace) capsule 100 mg  100 mg       Labs:   Recent Labs     23  0810 23  1904   WBC 6.3 8.3   RBC 4.05* 3.21*   HEMOGLOBIN 11.7* 9.5*   HEMATOCRIT 34.5* 27.4*   MCV 85.2 85.4   MCH 28.9 29.6   MCHC 33.9 34.7   RDW 42.3 42.6   PLATELETCT 216 176   MPV 11.6 11.2       Assessment:  Chief Admitting Dx:  Labor and delivery indication for care or intervention [O75.9]  Delivery Type:   for repeat  Tubal Ligation:  no    Plan:  Continue routine post partum care, advance care, encourage ambulation, pain control, anticipate d/c home in 1-2 days, POD#2-3. Baby in NICU currently for blood sugar issues.    ZACK Riggs.

## 2023-07-03 NOTE — PROGRESS NOTES
0700: Report received from Noc shift RN. Assumed pt care. Call light within reach, bed is locked and in the lowest position.      0735: Assessment complete. Education provided on expected lochia color and amount and when to call RN. Support offered for pumping as needed throughout the day. Reviewed plan of care for the day.

## 2023-07-03 NOTE — CARE PLAN
Problem: Pain  Goal: Patient's pain will be alleviated or reduced to the patient’s comfort goal  Outcome: Progressing     Problem: Respiratory/Oxygenation Function Post-Surgical  Goal: Patient will achieve/maintain normal respiratory rate/effort  Outcome: Progressing     Problem: Early Mobilization - Post Surgery  Goal: Early mobilization post surgery  Outcome: Progressing     The patient is Stable - Low risk of patient condition declining or worsening    Shift Goals  Clinical Goals: ambulate/ work on incentive spirometer  Patient Goals:   Family Goals:     Progress made toward(s) clinical / shift goals:  Patient able to ambulate POD #0. Patient encouraged to walk fifty feet at least four times a day. Incentive spirometer effective at 2000. Patient encouraged to use the incentive spirometer ten times an hour while awake.     Patient is not progressing towards the following goals:

## 2023-07-04 PROCEDURE — 700102 HCHG RX REV CODE 250 W/ 637 OVERRIDE(OP): Performed by: STUDENT IN AN ORGANIZED HEALTH CARE EDUCATION/TRAINING PROGRAM

## 2023-07-04 PROCEDURE — 700102 HCHG RX REV CODE 250 W/ 637 OVERRIDE(OP)

## 2023-07-04 PROCEDURE — 770002 HCHG ROOM/CARE - OB PRIVATE (112)

## 2023-07-04 PROCEDURE — A9270 NON-COVERED ITEM OR SERVICE: HCPCS | Performed by: STUDENT IN AN ORGANIZED HEALTH CARE EDUCATION/TRAINING PROGRAM

## 2023-07-04 PROCEDURE — A9270 NON-COVERED ITEM OR SERVICE: HCPCS

## 2023-07-04 RX ADMIN — IBUPROFEN 800 MG: 800 TABLET, FILM COATED ORAL at 18:21

## 2023-07-04 RX ADMIN — ACETAMINOPHEN 1000 MG: 500 TABLET, FILM COATED ORAL at 07:49

## 2023-07-04 RX ADMIN — DOCUSATE SODIUM 100 MG: 100 CAPSULE, LIQUID FILLED ORAL at 07:50

## 2023-07-04 RX ADMIN — ACETAMINOPHEN 1000 MG: 500 TABLET, FILM COATED ORAL at 14:43

## 2023-07-04 RX ADMIN — IBUPROFEN 800 MG: 800 TABLET, FILM COATED ORAL at 07:49

## 2023-07-04 ASSESSMENT — PAIN DESCRIPTION - PAIN TYPE
TYPE: SURGICAL PAIN
TYPE: SURGICAL PAIN;ACUTE PAIN
TYPE: SURGICAL PAIN
TYPE: SURGICAL PAIN

## 2023-07-04 NOTE — DISCHARGE PLANNING
Discharge Planning Assessment Post Partum    Reason for Referral: Hx of anxiety   Address: [address]   Type of Living Situation:Stable home   Mom Diagnosis: Pregnancy   Baby Diagnosis: Neponset  Primary Language: English     Name of Baby: Unknown  Father of the Baby: Vazquez Gunter   Involved in baby’s care? Yes   Contact Information: [phone]    Prenatal Care: Yes   Mom's PCP: None   PCP for new baby: Haven't picked one yet     Support System: Yes   Coping/Bonding between mother & baby: Appropriate   Source of Feeding: Breast feeding   Supplies for Infant: Yes     Mom's Insurance: Pending Medicaid   Baby Covered on Insurance:Yes   Mother Employed/School: None   Other children in the home/names & ages: 3rd child     Financial Hardship/Income: No   Mom's Mental status: Appropriate and stable   Services used prior to admit: none     CPS History: No  Psychiatric History: Depression and anxiety- sees a psychologist   Domestic Violence History: No   Drug/ETOH History: No    Resources Provided: PPD handout   Referrals Made: None      Clearance for Discharge: Baby is cleared to dc home with parents when medically cleared.      Ongoing Plan: LMSW to assist with any needs during NICU admission.

## 2023-07-04 NOTE — LACTATION NOTE
This note was copied from a baby's chart.  I spoke with Johanny this morning abut her lactation plan. She is consistently pumping and able to take a couple of syringes of colostrum to baby Reginald in the NICU.     I encouraged her to start pumping directly into the volufeeder bottles today. Reviewed breast pump hygiene process with her and advised her to contact her [facility] peer counselor tomorrow morning to arrange to  a breast pump when being discharged home.     She has breast fed for greater than 2.5 years and 2 years with her previous 2 children.

## 2023-07-04 NOTE — CARE PLAN
The patient is Stable - Low risk of patient condition declining or worsening    Shift Goals  Clinical Goals: VSS, pumping, visit baby in NICU  Patient Goals: visit NICU, rest  Family Goals: No family present    Problem: Knowledge Deficit - Postpartum  Goal: Patient will verbalize and demonstrate understanding of self and infant care  Outcome: Progressing  Note: Reinforced education on expected lochia color and amount and when to call RN. Reinforced education on incision care r/t , pt verbalizes and demonstrates understanding. Infant is in NICU, pt is going over for cares and working with NICU team to prepare to take infant home when appropriate.      Problem: Bowel Elimination - Post Surgical  Goal: Patient will resume regular bowel sounds and function with no discomfort or distention  Outcome: Progressing  Flowsheets (Taken 2023 1012)  Abdominal Assessment: Soft  Note: Bowel sounds present all quadrants, stool softener in use to prevent constipation, +flatus.  Pt encouraged to increase fluid intake as tolerated and to ambulate as tolerated to prevent constipation. Pt verbalizes and demonstrates understanding.

## 2023-07-04 NOTE — LACTATION NOTE
This note was copied from a baby's chart.  Baby Reginald latched well to both breasts and nursed without any difficulties. See intake sheet for before and after feeding weights. Mother did not require much assistance.

## 2023-07-04 NOTE — PROGRESS NOTES
0715: Report received from MARK ANTHONY Macias. Assumed pt care. Bed is locked and in the lowest position, call light is on the bed. Pt is currently in NICU with baby.     0750: Assessment complete. Education on lochia color and amount and when to call RN. Reviewed plan of care for the day, including cares for infant in NICU. No further needs or questions at this time.

## 2023-07-04 NOTE — CARE PLAN
The patient is Watcher - Medium risk of patient condition declining or worsening    Shift Goals  Clinical Goals: VSS, pain control, pump  Patient Goals: visit NICU, rest  Family Goals: No family present    Progress made toward(s) clinical / shift goals:      Problem: Pain  Goal: Patient's pain will be alleviated or reduced to the patient’s comfort goal  Outcome: Progressing   Pt reports comfortable pain level using scheduled tylenol and motrin.    Problem: Knowledge Deficit - Postpartum  Goal: Patient will verbalize and demonstrate understanding of self and infant care  Outcome: Progressing       Patient is not progressing towards the following goals:

## 2023-07-04 NOTE — PROGRESS NOTES
Obstetrics & Gynecology Post-Delivery Progress Note    Date of Service      27 y.o.  s/p vaginal, spontaneous  Delivery date: 23      Subjective  Pt reports she is feeling well with well controlled pain.     Pain: Well controlled  Bleeding: lochia minimal  Tolerating PO: yes  Voiding: without difficulty  Ambulating: yes  Passing flatus: Yes  Feeding: breastfeeding well      Objective  24hr VS:  Temp:  [36.1 °C (97 °F)-36.9 °C (98.4 °F)] 36.1 °C (97 °F)  Pulse:  [60-68] 60  Resp:  [17-18] 17  BP: (110-124)/(67-73) 124/70  SpO2:  [96 %-98 %] 96 %    Physical Exam  Gen: well appearing, no apparent distress, resting comfortably in bed  CV: rrr, no m/r/g  Resp: CTAB, symmetric breath sounds  Abd: soft, nontender, nondistended  Fundus: firm at umbilicus   Incision: dressing clean, dry, intact  Ext: symmetric, no peripheral edema, calves nontender    Labs:  Results for orders placed or performed during the hospital encounter of 23   Hold Blood Bank Specimen (Not Tested)   Result Value Ref Range    Holding Tube - Bb DONE    CBC with Differential   Result Value Ref Range    WBC 6.3 4.8 - 10.8 K/uL    RBC 4.05 (L) 4.20 - 5.40 M/uL    Hemoglobin 11.7 (L) 12.0 - 16.0 g/dL    Hematocrit 34.5 (L) 37.0 - 47.0 %    MCV 85.2 81.4 - 97.8 fL    MCH 28.9 27.0 - 33.0 pg    MCHC 33.9 32.2 - 35.5 g/dL    RDW 42.3 35.9 - 50.0 fL    Platelet Count 216 164 - 446 K/uL    MPV 11.6 9.0 - 12.9 fL    Neutrophils-Polys 55.30 44.00 - 72.00 %    Lymphocytes 34.60 22.00 - 41.00 %    Monocytes 8.30 0.00 - 13.40 %    Eosinophils 0.60 0.00 - 6.90 %    Basophils 0.60 0.00 - 1.80 %    Immature Granulocytes 0.60 0.00 - 0.90 %    Nucleated RBC 0.00 0.00 - 0.20 /100 WBC    Neutrophils (Absolute) 3.47 1.82 - 7.42 K/uL    Lymphs (Absolute) 2.17 1.00 - 4.80 K/uL    Monos (Absolute) 0.52 0.00 - 0.85 K/uL    Eos (Absolute) 0.04 0.00 - 0.51 K/uL    Baso (Absolute) 0.04 0.00 - 0.12 K/uL    Immature Granulocytes (abs) 0.04 0.00 - 0.11 K/uL    NRBC  (Absolute) 0.00 K/uL   T.PALLIDUM AB SHAHID (Syphilis)   Result Value Ref Range    Syphilis, Treponemal Qual Non-Reactive Non-Reactive   CBC without differential- Once in 8 hours post delivery   Result Value Ref Range    WBC 8.3 4.8 - 10.8 K/uL    RBC 3.21 (L) 4.20 - 5.40 M/uL    Hemoglobin 9.5 (L) 12.0 - 16.0 g/dL    Hematocrit 27.4 (L) 37.0 - 47.0 %    MCV 85.4 81.4 - 97.8 fL    MCH 29.6 27.0 - 33.0 pg    MCHC 34.7 32.2 - 35.5 g/dL    RDW 42.6 35.9 - 50.0 fL    Platelet Count 176 164 - 446 K/uL    MPV 11.2 9.0 - 12.9 fL   POCT glucose device results   Result Value Ref Range    POC Glucose, Blood 68 65 - 99 mg/dL         Medications  ibuprofen, 800 mg, Oral, Q8HRS  acetaminophen, 1,000 mg, Oral, Q6HRS      PRN medications: ibuprofen **FOLLOWED BY** [START ON 2023] ibuprofen, oxytocin, LR, acetaminophen **FOLLOWED BY** [START ON 2023] acetaminophen, oxyCODONE immediate-release, oxyCODONE immediate release, ondansetron **OR** ondansetron, diphenhydrAMINE **OR** diphenhydrAMINE, docusate sodium      Assessment/Plan  Johanny Hubbard is a 27 y.o.yo  postpartum day #2  s/p  for repeat    - Post care: meeting all goals  - Baby is in NICU for hypoglycemia and she would like to discharge at the same time as baby  - Pain: controlled  - Rh+, Rubella Immune  - Method of Feeding: plans to breastfeed    VTE prophylaxis: SCDs    - Disposition: likely home PPD3     Monalisa Nettles, DO  PGY-1, [facility]

## 2023-07-04 NOTE — PROGRESS NOTES
1900-Received report and assumed care at shift change. Pt in NICU.    2130-Pt returned from NICU after visiting infant.  Pt assessed. Fundus is firm. Lochia is scant. Pt voiding normally. Pt ambulates independently. Abdominal incision covered with mepilex, old drainage on bandage. Discussed plan for the night. Pt plans to continue pumping. Will go back to NICU between 1149-9589. Needs are met at this time.

## 2023-07-05 PROCEDURE — A9270 NON-COVERED ITEM OR SERVICE: HCPCS | Performed by: STUDENT IN AN ORGANIZED HEALTH CARE EDUCATION/TRAINING PROGRAM

## 2023-07-05 PROCEDURE — 700102 HCHG RX REV CODE 250 W/ 637 OVERRIDE(OP)

## 2023-07-05 PROCEDURE — A9270 NON-COVERED ITEM OR SERVICE: HCPCS

## 2023-07-05 PROCEDURE — 770002 HCHG ROOM/CARE - OB PRIVATE (112)

## 2023-07-05 PROCEDURE — 700102 HCHG RX REV CODE 250 W/ 637 OVERRIDE(OP): Performed by: STUDENT IN AN ORGANIZED HEALTH CARE EDUCATION/TRAINING PROGRAM

## 2023-07-05 RX ADMIN — ACETAMINOPHEN 1000 MG: 500 TABLET, FILM COATED ORAL at 11:34

## 2023-07-05 RX ADMIN — ACETAMINOPHEN 1000 MG: 500 TABLET, FILM COATED ORAL at 19:47

## 2023-07-05 RX ADMIN — IBUPROFEN 800 MG: 800 TABLET, FILM COATED ORAL at 11:34

## 2023-07-05 RX ADMIN — ACETAMINOPHEN 1000 MG: 500 TABLET, FILM COATED ORAL at 04:30

## 2023-07-05 ASSESSMENT — PAIN DESCRIPTION - PAIN TYPE: TYPE: ACUTE PAIN;SURGICAL PAIN

## 2023-07-05 NOTE — PROGRESS NOTES
"Post Partum Progress Note    Name:   Johanny Hubbard   Date/Time:  2023 - 5:49 AM  Chief Admitting Dx:  Labor and delivery indication for care or intervention [O75.9]  Delivery Type:   for repeat  Post-Op/Post Partum Days #:  3    Subjective:  Abdominal pain: no  Ambulating:   yes  Tolerating liquids:  yes  Tolerating food:  yes common adult  Flatus:   yes  BM:    yes  Bleeding:   with a small amount of bleeding  Voiding:   yes  Dizziness:   no  Feeding:   both breast and bottle - pumping for baby in NICU    Vitals:    23 0730 23 0745 23 1000 23 1800   BP:  124/70 123/73 130/89   Pulse:  60 65 61   Resp:  17 18 17   Temp:  36.1 °C (97 °F) 36.1 °C (96.9 °F) 36.1 °C (97 °F)   TempSrc:  Temporal Temporal Temporal   SpO2:  96% 97% 99%   Weight: 78 kg (172 lb)      Height: 1.473 m (4' 10\")          Exam:  Breast: Tenderness no  Abdomen: Abdomen soft, non-tender. BS normal. No masses,  No organomegaly  Fundal Tenderness:  no  Fundus Firm: yes  Incision: dry and intact  Below umbilicus: yes  Perineum: perineum intact  Lochia: mild  Extremities: Normal extremities, peripheral pulses and reflexes normal    Meds:  Current Facility-Administered Medications   Medication Dose    ibuprofen (Motrin) tablet 800 mg  800 mg    Followed by    [START ON 2023] ibuprofen (Motrin) tablet 800 mg  800 mg    oxytocin (Pitocin) infusion (for post delivery)  125 mL/hr    oxytocin (Pitocin) injection 10 Units  10 Units    lactated ringers infusion      lactated ringers infusion      acetaminophen (Tylenol) tablet 1,000 mg  1,000 mg    Followed by    [START ON 2023] acetaminophen (Tylenol) tablet 1,000 mg  1,000 mg    oxyCODONE immediate-release (Roxicodone) tablet 5 mg  5 mg    oxyCODONE immediate release (Roxicodone) tablet 10 mg  10 mg    ondansetron (Zofran) syringe/vial injection 4 mg  4 mg    Or    ondansetron (Zofran ODT) dispertab 4 mg  4 mg    diphenhydrAMINE (Benadryl) tablet/capsule " 25 mg  25 mg    Or    diphenhydrAMINE (Benadryl) injection 25 mg  25 mg    docusate sodium (Colace) capsule 100 mg  100 mg       Labs:   Recent Labs     23  0810 23  1904   WBC 6.3 8.3   RBC 4.05* 3.21*   HEMOGLOBIN 11.7* 9.5*   HEMATOCRIT 34.5* 27.4*   MCV 85.2 85.4   MCH 28.9 29.6   MCHC 33.9 34.7   RDW 42.3 42.6   PLATELETCT 216 176   MPV 11.6 11.2       Assessment:  Chief Admitting Dx:  Labor and delivery indication for care or intervention [O75.9]  Delivery Type:   for repeat  Tubal Ligation:  no    Plan:  Continue routine post partum care.  Meeting all postpartum goals  Baby remains in NICU, pt desires to remain in hospital until POD#4    JALEESA Rocha.

## 2023-07-05 NOTE — LACTATION NOTE
Follow up lactation consult:    Met with Johanny to evaluate breastfeeding and pumping. She reports that her milk is coming in and she is able to pump about 50mL per pump session. Baby Reginald is breast feeding well. She states that her nipples are intact and non-tender, and she denies any breast engorgement or discomfort. Johanny denies having any questions or concerns at this time.     She is meeting with the [facility] liaison this morning to ensure that she will have a hospital-grade pump available to her at time of discharge, if Reginald is still hospitalized in the NICU.    Hand pump offered, and accepted, as Johanny does not yet have a personal pump at home.    Feeding Plan:    Cue-based breastfeeding when mom and baby are together (as infant's medical condition allows). When , Johanny will continue to pump every 3 hours, for a total of 8+ breast milk expression sessions per 24 hours (inclusive of breastfeeding & pumping).    Johanny is encouraged to contact RN/Lactation consultant with any developing breastfeeding or pumping concerns during her or Reginald's hospital stays, and she will follow up with [facility], as needed, for outpatient lactation support.

## 2023-07-05 NOTE — CARE PLAN
The patient is Stable - Low risk of patient condition declining or worsening    Shift Goals  Clinical Goals: maintain vitals  Patient Goals: visit NICU, rest  Family Goals: No family present    Progress made toward(s) clinical / shift goals:  Pain controlled with current meds    Patient is not progressing towards the following goals:

## 2023-07-06 ENCOUNTER — PHARMACY VISIT (OUTPATIENT)
Dept: PHARMACY | Facility: MEDICAL CENTER | Age: 28
End: 2023-07-06
Payer: COMMERCIAL

## 2023-07-06 VITALS
TEMPERATURE: 97.1 F | BODY MASS INDEX: 36.11 KG/M2 | HEIGHT: 58 IN | SYSTOLIC BLOOD PRESSURE: 125 MMHG | WEIGHT: 172 LBS | OXYGEN SATURATION: 95 % | HEART RATE: 69 BPM | RESPIRATION RATE: 16 BRPM | DIASTOLIC BLOOD PRESSURE: 80 MMHG

## 2023-07-06 PROCEDURE — 700102 HCHG RX REV CODE 250 W/ 637 OVERRIDE(OP): Performed by: STUDENT IN AN ORGANIZED HEALTH CARE EDUCATION/TRAINING PROGRAM

## 2023-07-06 PROCEDURE — A9270 NON-COVERED ITEM OR SERVICE: HCPCS | Performed by: STUDENT IN AN ORGANIZED HEALTH CARE EDUCATION/TRAINING PROGRAM

## 2023-07-06 PROCEDURE — A9270 NON-COVERED ITEM OR SERVICE: HCPCS

## 2023-07-06 PROCEDURE — 700102 HCHG RX REV CODE 250 W/ 637 OVERRIDE(OP)

## 2023-07-06 PROCEDURE — RXMED WILLOW AMBULATORY MEDICATION CHARGE

## 2023-07-06 RX ORDER — PSEUDOEPHEDRINE HCL 30 MG
100 TABLET ORAL 2 TIMES DAILY PRN
Qty: 60 CAPSULE | Refills: 0 | Status: SHIPPED | OUTPATIENT
Start: 2023-07-06 | End: 2023-07-20

## 2023-07-06 RX ORDER — ACETAMINOPHEN 500 MG
1000 TABLET ORAL EVERY 6 HOURS
Qty: 30 TABLET | Refills: 0 | Status: SHIPPED | OUTPATIENT
Start: 2023-07-06

## 2023-07-06 RX ORDER — IBUPROFEN 800 MG/1
800 TABLET ORAL EVERY 8 HOURS
Qty: 30 TABLET | Refills: 0 | Status: SHIPPED | OUTPATIENT
Start: 2023-07-06 | End: 2023-07-20

## 2023-07-06 RX ADMIN — ACETAMINOPHEN 1000 MG: 500 TABLET, FILM COATED ORAL at 01:59

## 2023-07-06 RX ADMIN — IBUPROFEN 800 MG: 800 TABLET, FILM COATED ORAL at 04:00

## 2023-07-06 RX ADMIN — IBUPROFEN 800 MG: 800 TABLET, FILM COATED ORAL at 12:32

## 2023-07-06 RX ADMIN — ACETAMINOPHEN 1000 MG: 500 TABLET, FILM COATED ORAL at 12:32

## 2023-07-06 ASSESSMENT — PAIN DESCRIPTION - PAIN TYPE: TYPE: SURGICAL PAIN

## 2023-07-06 ASSESSMENT — PATIENT HEALTH QUESTIONNAIRE - PHQ9
2. FEELING DOWN, DEPRESSED, IRRITABLE, OR HOPELESS: NOT AT ALL
SUM OF ALL RESPONSES TO PHQ9 QUESTIONS 1 AND 2: 0
1. LITTLE INTEREST OR PLEASURE IN DOING THINGS: NOT AT ALL

## 2023-07-06 NOTE — LACTATION NOTE
This note was copied from a baby's chart.  LC stopped by to see how pumping has been going for NICU baby. Mom has not been in room. 8:00 am and 11:00 am.

## 2023-07-06 NOTE — PROGRESS NOTES
Assumed care of patient, report at bedside from, Brit HOPSON. Assessment completed and WDL. Call light within reach, discussed plan of care,mediated with Tylenol. Will continue to monitor.

## 2023-07-06 NOTE — PROGRESS NOTES
7096-- Assessment completed, VSS, Pt given schedule pain medication at this time.  Discussed plan of care for the day that pt is comfortable with.  Discharge teaching reviewed with pt, all questions answered.  Pt has all written information on self care, including prescription information, and follow-up instructions.
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
## 2023-07-06 NOTE — DISCHARGE INSTRUCTIONS
PATIENT DISCHARGE EDUCATION INSTRUCTION SHEET    REASONS TO CALL YOUR OBSTETRICIAN  Persistent fever, shaking, chills (Temperature higher than 100.4) may indicate you have an infection  Heavy bleeding: soaking more than 1 pad per hour; Passing clots an egg-sized clot or bigger may mean you have an postpartum hemorrhage  Foul odor from vagina or bad smelling or discolored discharge or blood  Breast infection (Mastitis symptoms); breast pain, chills, fever, redness or red streaks, may feel flu like symptoms  Urinary pain, burning or frequency  Incision that is not healing, increased redness, swelling, tenderness or pain, or any pus from episiotomy or  site may mean you have an infection  Redness, swelling, warmth, or painful to touch in the calf area of your leg may mean you have a blood clot  Severe or intensified depression, thoughts or feelings of wanting to hurt yourself or someone else   Pain in chest, obstructed breathing or shortness of breath (trouble catching your breath) may mean you are having a postpartum complication. Call your provider immediately   Headache that does not get better, even after taking medicine, a bad headache with vision changes or pain in the upper right area of your belly may mean you have high blood pressure or post birth preeclampsia. Call your provider immediately    HAND WASHING  All family and friends should wash their hands:  Before and after holding the baby  Before feeding the baby  After using the restroom or changing the baby's diaper    WOUND CARE  Ask your physician for additional care instructions. In general:   Incision:  May shower and pat incision dry   Keep the incision clean and dry  There should not be any opening or pus from the incision  Continue to walk at home 3 times a day   Do NOT lift anything heavier than your baby (over 10 pounds)  Encourage family to help participate in care of the  to allow rest and mom time to heal    VAGINAL CARE  "AND BLEEDING  Nothing inside vagina for 6 weeks:   No sexual intercourse, tampons or douching  Bleeding may continue for 2-4 weeks. Amount and color may vary  Soaking 1 pad or more in an hour for several hours is considered heavy bleeding  Passing large egg sized blood clots can be concerning  If you feel like you have heavy bleeding or are having increasing amount of blood clots call your Obstetrician immediately  If you begin feeling faint upon standing, feeling sick to your stomach, have clammy skin, a really fast heartbeat, have chills, start feeling confused, dizzy, sleepy or weak, or feeling like you're going to faint call your Obstetrician immediately    HYPERTENSION   Preeclampsia or gestational hypertension are types of high blood pressure that only pregnant women can get. It is important for you to be aware of symptoms to seek early intervention and treatment. If you have any of these symptoms immediately call your Obstetrician    Vision changes or blurred vision   Severe headache or pain that is unrelieved with medication and will not go away  Persistent pain in upper abdomen or shoulder   Increased swelling of face, feet, or hands  Difficulty breathing or shortness of breath at rest  Urinating less than usual    URINATION AND BOWEL MOVEMENTS  Eating more fiber (bran cereal, fruits, and vegetables) and drinking plenty of fluids will help to avoid constipation  Urinary frequency and urgency after childbirth is normal  If you experience any urinary pain, burning or frequency call your provider    BIRTH CONTROL  It is possible to become pregnant at any time after delivery and while breastfeeding  Plan to discuss a method of birth control with your physician at your post delivery follow up visit    POSTPARTUM BLUES  During the first few days after birth, you may experience a sense of the \"blues\" which may include impatience, irritability or even crying. These feelings come and go quickly. However, as many as " "1 in 10 women experience emotional symptoms known as postpartum depression.     POSTPARTUM DEPRESSION    May start as early as the second or third day after delivery or take several weeks or months to develop. Symptoms of \"blues\" are present, but are more intense: Crying spells; loss of appetite; feelings of hopelessness or loss of control; fear of touching the baby; over concern or no concern at all about the baby; little or no concern about your own appearance/caring for yourself; and/or inability to sleep or excessive sleeping. Contact your Obstetrician if you are experiencing any of these symptoms     PREVENTING SHAKEN BABY  If you are angry or stressed, PUT THE BABY IN THE CRIB, step away, take some deep breaths, and wait until you are calm to care for the baby. DO NOT SHAKE THE BABY. You are not alone, call a supporter for help.  [facility] 24/7 crisis call line (157-544-0877) or (1-454.254.5594)  You can also text them, text \"ANSWER\" (476608)    Pelvic rest x6 weeks  Return for follow up visit in 1 week.  Call or come to ED for: heavy vaginal bleeding, fever >100.4, severe abdominal pain, severe headache, chest pain, shortness of breath,  N/V, incisional drainage, or other concerns.   "

## 2023-07-06 NOTE — DISCHARGE PLANNING
Meds-to-Beds: Discharge prescription orders listed below delivered to patient's bedside. RN Jaky notified. Patient counseled via phone. Patient elected to have co-payment billed to patient account.      Current Outpatient Medications   Medication Sig Dispense Refill    acetaminophen (TYLENOL) 500 MG Tab Take 2 Tablets by mouth every 6 hours. 30 Tablet 0    docusate sodium 100 MG Cap Take 100 mg by mouth 2 times a day as needed for Constipation. 60 Capsule 0    ibuprofen (MOTRIN) 800 MG Tab Take 1 Tablet by mouth every 8 hours. 30 Tablet 0      Ny Sanders, PharmD

## 2023-07-06 NOTE — PROGRESS NOTES
0700- Received report from MARK ANTHONY Davis.  Pt is NICU visiting infant.      1020-  Pt remains in NICU at this time.  Will continue to monitor.

## 2023-07-12 ENCOUNTER — GYNECOLOGY VISIT (OUTPATIENT)
Dept: OBGYN | Facility: CLINIC | Age: 28
End: 2023-07-12

## 2023-07-12 VITALS — BODY MASS INDEX: 31.77 KG/M2 | SYSTOLIC BLOOD PRESSURE: 98 MMHG | WEIGHT: 152 LBS | DIASTOLIC BLOOD PRESSURE: 62 MMHG

## 2023-07-12 DIAGNOSIS — Z48.89 ENCOUNTER FOR POSTOPERATIVE CARE: ICD-10-CM

## 2023-07-12 PROCEDURE — 99024 POSTOP FOLLOW-UP VISIT: CPT | Performed by: OBSTETRICS & GYNECOLOGY

## 2023-07-12 PROCEDURE — 3074F SYST BP LT 130 MM HG: CPT | Performed by: OBSTETRICS & GYNECOLOGY

## 2023-07-12 PROCEDURE — 3078F DIAST BP <80 MM HG: CPT | Performed by: OBSTETRICS & GYNECOLOGY

## 2023-07-12 NOTE — PROGRESS NOTES
Postoperative Follow Up Visit    Johanny Hubbard is a 28 y.o. female    Chief complaint    No chief complaint on file.      S/p RCS on 7/2/23 for history of prior c/s presenting for postop check    COMPLAINTS:    C/o some soreness on right side of abdomen adjacent to incision.  Helps with ibuprofen and tylenol.    No fevers/chills, no N/V, no constipation/diarrhea, no heavy bleeding. Breastfeeding her infant.       OBJECTIVE:    BP 98/62 (BP Location: Left arm, Patient Position: Sitting)   Wt 152 lb   LMP 10/01/2022 (Exact Date)   BMI 31.77 kg/m²     General: No acute distress, well nourished, alert.     ABDOMEN: Soft nondistended, nontender, no peritoneal signs, no masses.     INCISION: Clean, dry, intact, no drainage, erythema or separation. Small area of ecchymosis on right lower edge of incision 1 x 2 cm.    A/P    1. Encounter for postoperative care        S/p RCS on 7/2/23 for history of prior C/s, doing well.     Reassurance regarding postop incisional pain. Continue OTC pain meds.     Follow up in 5 weeks for postpartum visit.     Sylvia Thomas M.D.    Obstetrics and Gynecology    7/12/20233:02 PM

## 2023-07-12 NOTE — PROGRESS NOTES
Pt here today for C/s check.  C/s was on 07/02/2023  Currently breast feeding  C/o some soreness mostly on the right side  Phone # [phone] (home)   Pharmacy verified.

## 2023-07-20 ENCOUNTER — APPOINTMENT (OUTPATIENT)
Dept: RADIOLOGY | Facility: MEDICAL CENTER | Age: 28
End: 2023-07-20
Attending: EMERGENCY MEDICINE
Payer: MEDICAID

## 2023-07-20 ENCOUNTER — HOSPITAL ENCOUNTER (OUTPATIENT)
Facility: MEDICAL CENTER | Age: 28
End: 2023-07-22
Attending: EMERGENCY MEDICINE | Admitting: SURGERY
Payer: MEDICAID

## 2023-07-20 DIAGNOSIS — R10.10 PAIN OF UPPER ABDOMEN: ICD-10-CM

## 2023-07-20 DIAGNOSIS — K85.10 GALLSTONE PANCREATITIS: ICD-10-CM

## 2023-07-20 LAB
ALBUMIN SERPL BCP-MCNC: 4.3 G/DL (ref 3.2–4.9)
ALBUMIN/GLOB SERPL: 1.2 G/DL
ALP SERPL-CCNC: 304 U/L (ref 30–99)
ALT SERPL-CCNC: 164 U/L (ref 2–50)
ANION GAP SERPL CALC-SCNC: 17 MMOL/L (ref 7–16)
APPEARANCE UR: CLEAR
AST SERPL-CCNC: 330 U/L (ref 12–45)
BACTERIA #/AREA URNS HPF: ABNORMAL /HPF
BASOPHILS # BLD AUTO: 0.4 % (ref 0–1.8)
BASOPHILS # BLD: 0.03 K/UL (ref 0–0.12)
BILIRUB SERPL-MCNC: 1.3 MG/DL (ref 0.1–1.5)
BILIRUB UR QL STRIP.AUTO: NEGATIVE
BUN SERPL-MCNC: 13 MG/DL (ref 8–22)
CALCIUM ALBUM COR SERPL-MCNC: 9.3 MG/DL (ref 8.5–10.5)
CALCIUM SERPL-MCNC: 9.5 MG/DL (ref 8.5–10.5)
CHLORIDE SERPL-SCNC: 103 MMOL/L (ref 96–112)
CO2 SERPL-SCNC: 18 MMOL/L (ref 20–33)
COLOR UR: ABNORMAL
CREAT SERPL-MCNC: 0.58 MG/DL (ref 0.5–1.4)
EOSINOPHIL # BLD AUTO: 0.02 K/UL (ref 0–0.51)
EOSINOPHIL NFR BLD: 0.2 % (ref 0–6.9)
EPI CELLS #/AREA URNS HPF: NEGATIVE /HPF
ERYTHROCYTE [DISTWIDTH] IN BLOOD BY AUTOMATED COUNT: 40.8 FL (ref 35.9–50)
GFR SERPLBLD CREATININE-BSD FMLA CKD-EPI: 127 ML/MIN/1.73 M 2
GLOBULIN SER CALC-MCNC: 3.7 G/DL (ref 1.9–3.5)
GLUCOSE SERPL-MCNC: 112 MG/DL (ref 65–99)
GLUCOSE UR STRIP.AUTO-MCNC: NEGATIVE MG/DL
HCG SERPL QL: NEGATIVE
HCT VFR BLD AUTO: 40.4 % (ref 37–47)
HGB BLD-MCNC: 13.5 G/DL (ref 12–16)
HYALINE CASTS #/AREA URNS LPF: ABNORMAL /LPF
IMM GRANULOCYTES # BLD AUTO: 0.03 K/UL (ref 0–0.11)
IMM GRANULOCYTES NFR BLD AUTO: 0.4 % (ref 0–0.9)
KETONES UR STRIP.AUTO-MCNC: NEGATIVE MG/DL
LEUKOCYTE ESTERASE UR QL STRIP.AUTO: ABNORMAL
LIPASE SERPL-CCNC: >3000 U/L (ref 11–82)
LYMPHOCYTES # BLD AUTO: 1.59 K/UL (ref 1–4.8)
LYMPHOCYTES NFR BLD: 18.6 % (ref 22–41)
MCH RBC QN AUTO: 27.9 PG (ref 27–33)
MCHC RBC AUTO-ENTMCNC: 33.4 G/DL (ref 32.2–35.5)
MCV RBC AUTO: 83.5 FL (ref 81.4–97.8)
MICRO URNS: ABNORMAL
MONOCYTES # BLD AUTO: 0.71 K/UL (ref 0–0.85)
MONOCYTES NFR BLD AUTO: 8.3 % (ref 0–13.4)
NEUTROPHILS # BLD AUTO: 6.17 K/UL (ref 1.82–7.42)
NEUTROPHILS NFR BLD: 72.1 % (ref 44–72)
NITRITE UR QL STRIP.AUTO: NEGATIVE
NRBC # BLD AUTO: 0 K/UL
NRBC BLD-RTO: 0 /100 WBC (ref 0–0.2)
PH UR STRIP.AUTO: 7 [PH] (ref 5–8)
PLATELET # BLD AUTO: 429 K/UL (ref 164–446)
PMV BLD AUTO: 10.8 FL (ref 9–12.9)
POTASSIUM SERPL-SCNC: 3.9 MMOL/L (ref 3.6–5.5)
PROT SERPL-MCNC: 8 G/DL (ref 6–8.2)
PROT UR QL STRIP: 30 MG/DL
RBC # BLD AUTO: 4.84 M/UL (ref 4.2–5.4)
RBC # URNS HPF: ABNORMAL /HPF
RBC UR QL AUTO: ABNORMAL
SODIUM SERPL-SCNC: 138 MMOL/L (ref 135–145)
SP GR UR STRIP.AUTO: 1.02
UROBILINOGEN UR STRIP.AUTO-MCNC: 1 MG/DL
WBC # BLD AUTO: 8.6 K/UL (ref 4.8–10.8)
WBC #/AREA URNS HPF: ABNORMAL /HPF

## 2023-07-20 PROCEDURE — 700105 HCHG RX REV CODE 258: Mod: JZ | Performed by: EMERGENCY MEDICINE

## 2023-07-20 PROCEDURE — A9270 NON-COVERED ITEM OR SERVICE: HCPCS | Performed by: EMERGENCY MEDICINE

## 2023-07-20 PROCEDURE — 83690 ASSAY OF LIPASE: CPT

## 2023-07-20 PROCEDURE — A9270 NON-COVERED ITEM OR SERVICE: HCPCS | Performed by: SURGERY

## 2023-07-20 PROCEDURE — 96374 THER/PROPH/DIAG INJ IV PUSH: CPT

## 2023-07-20 PROCEDURE — G0378 HOSPITAL OBSERVATION PER HR: HCPCS

## 2023-07-20 PROCEDURE — 85025 COMPLETE CBC W/AUTO DIFF WBC: CPT

## 2023-07-20 PROCEDURE — 76705 ECHO EXAM OF ABDOMEN: CPT

## 2023-07-20 PROCEDURE — 74181 MRI ABDOMEN W/O CONTRAST: CPT

## 2023-07-20 PROCEDURE — 80053 COMPREHEN METABOLIC PANEL: CPT

## 2023-07-20 PROCEDURE — 99285 EMERGENCY DEPT VISIT HI MDM: CPT

## 2023-07-20 PROCEDURE — 84703 CHORIONIC GONADOTROPIN ASSAY: CPT

## 2023-07-20 PROCEDURE — 36415 COLL VENOUS BLD VENIPUNCTURE: CPT

## 2023-07-20 PROCEDURE — 700111 HCHG RX REV CODE 636 W/ 250 OVERRIDE (IP): Mod: JZ | Performed by: EMERGENCY MEDICINE

## 2023-07-20 PROCEDURE — 700102 HCHG RX REV CODE 250 W/ 637 OVERRIDE(OP): Performed by: SURGERY

## 2023-07-20 PROCEDURE — 81001 URINALYSIS AUTO W/SCOPE: CPT

## 2023-07-20 PROCEDURE — 96375 TX/PRO/DX INJ NEW DRUG ADDON: CPT

## 2023-07-20 PROCEDURE — 700101 HCHG RX REV CODE 250: Performed by: SURGERY

## 2023-07-20 PROCEDURE — 700102 HCHG RX REV CODE 250 W/ 637 OVERRIDE(OP): Performed by: EMERGENCY MEDICINE

## 2023-07-20 PROCEDURE — 99223 1ST HOSP IP/OBS HIGH 75: CPT | Mod: 57 | Performed by: SURGERY

## 2023-07-20 RX ORDER — ACETAMINOPHEN 500 MG
1000 TABLET ORAL EVERY 6 HOURS PRN
Status: DISCONTINUED | OUTPATIENT
Start: 2023-07-25 | End: 2023-07-22 | Stop reason: HOSPADM

## 2023-07-20 RX ORDER — HALOPERIDOL 5 MG/ML
1 INJECTION INTRAMUSCULAR EVERY 6 HOURS PRN
Status: DISCONTINUED | OUTPATIENT
Start: 2023-07-20 | End: 2023-07-20

## 2023-07-20 RX ORDER — ACETAMINOPHEN 500 MG
1000 TABLET ORAL ONCE
Status: COMPLETED | OUTPATIENT
Start: 2023-07-20 | End: 2023-07-20

## 2023-07-20 RX ORDER — OXYCODONE HYDROCHLORIDE 5 MG/1
5 TABLET ORAL
Status: DISCONTINUED | OUTPATIENT
Start: 2023-07-20 | End: 2023-07-22 | Stop reason: HOSPADM

## 2023-07-20 RX ORDER — ACETAMINOPHEN 500 MG
1000 TABLET ORAL EVERY 6 HOURS
Status: DISCONTINUED | OUTPATIENT
Start: 2023-07-20 | End: 2023-07-22 | Stop reason: HOSPADM

## 2023-07-20 RX ORDER — OXYCODONE HYDROCHLORIDE 10 MG/1
10 TABLET ORAL
Status: DISCONTINUED | OUTPATIENT
Start: 2023-07-20 | End: 2023-07-22 | Stop reason: HOSPADM

## 2023-07-20 RX ORDER — ONDANSETRON 2 MG/ML
4 INJECTION INTRAMUSCULAR; INTRAVENOUS EVERY 4 HOURS PRN
Status: DISCONTINUED | OUTPATIENT
Start: 2023-07-20 | End: 2023-07-22 | Stop reason: HOSPADM

## 2023-07-20 RX ORDER — TRAZODONE HYDROCHLORIDE 50 MG/1
50 TABLET ORAL NIGHTLY PRN
Status: DISCONTINUED | OUTPATIENT
Start: 2023-07-20 | End: 2023-07-22 | Stop reason: HOSPADM

## 2023-07-20 RX ORDER — DIPHENHYDRAMINE HCL 25 MG
25 TABLET ORAL EVERY 6 HOURS PRN
Status: DISCONTINUED | OUTPATIENT
Start: 2023-07-20 | End: 2023-07-20

## 2023-07-20 RX ORDER — DEXTROSE MONOHYDRATE, SODIUM CHLORIDE, AND POTASSIUM CHLORIDE 50; 1.49; 9 G/1000ML; G/1000ML; G/1000ML
INJECTION, SOLUTION INTRAVENOUS CONTINUOUS
Status: DISCONTINUED | OUTPATIENT
Start: 2023-07-20 | End: 2023-07-22

## 2023-07-20 RX ORDER — HYDROMORPHONE HYDROCHLORIDE 1 MG/ML
0.5 INJECTION, SOLUTION INTRAMUSCULAR; INTRAVENOUS; SUBCUTANEOUS
Status: DISCONTINUED | OUTPATIENT
Start: 2023-07-20 | End: 2023-07-22 | Stop reason: HOSPADM

## 2023-07-20 RX ORDER — ONDANSETRON 2 MG/ML
4 INJECTION INTRAMUSCULAR; INTRAVENOUS ONCE
Status: COMPLETED | OUTPATIENT
Start: 2023-07-20 | End: 2023-07-20

## 2023-07-20 RX ORDER — DIPHENHYDRAMINE HYDROCHLORIDE 50 MG/ML
25 INJECTION INTRAMUSCULAR; INTRAVENOUS EVERY 6 HOURS PRN
Status: DISCONTINUED | OUTPATIENT
Start: 2023-07-20 | End: 2023-07-20

## 2023-07-20 RX ORDER — DEXAMETHASONE SODIUM PHOSPHATE 4 MG/ML
4 INJECTION, SOLUTION INTRA-ARTICULAR; INTRALESIONAL; INTRAMUSCULAR; INTRAVENOUS; SOFT TISSUE
Status: DISCONTINUED | OUTPATIENT
Start: 2023-07-20 | End: 2023-07-22 | Stop reason: HOSPADM

## 2023-07-20 RX ORDER — MORPHINE SULFATE 4 MG/ML
4 INJECTION INTRAVENOUS ONCE
Status: COMPLETED | OUTPATIENT
Start: 2023-07-20 | End: 2023-07-20

## 2023-07-20 RX ORDER — CALCIUM CARBONATE 500 MG/1
500 TABLET, CHEWABLE ORAL
Status: DISCONTINUED | OUTPATIENT
Start: 2023-07-20 | End: 2023-07-22 | Stop reason: HOSPADM

## 2023-07-20 RX ORDER — SODIUM CHLORIDE, SODIUM LACTATE, POTASSIUM CHLORIDE, CALCIUM CHLORIDE 600; 310; 30; 20 MG/100ML; MG/100ML; MG/100ML; MG/100ML
INJECTION, SOLUTION INTRAVENOUS CONTINUOUS
Status: DISCONTINUED | OUTPATIENT
Start: 2023-07-20 | End: 2023-07-21 | Stop reason: ALTCHOICE

## 2023-07-20 RX ORDER — ENOXAPARIN SODIUM 100 MG/ML
40 INJECTION SUBCUTANEOUS DAILY
Status: DISCONTINUED | OUTPATIENT
Start: 2023-07-21 | End: 2023-07-22 | Stop reason: HOSPADM

## 2023-07-20 RX ORDER — DIPHENHYDRAMINE HYDROCHLORIDE 50 MG/ML
25 INJECTION INTRAMUSCULAR; INTRAVENOUS EVERY 6 HOURS PRN
Status: DISCONTINUED | OUTPATIENT
Start: 2023-07-20 | End: 2023-07-22

## 2023-07-20 RX ORDER — SCOLOPAMINE TRANSDERMAL SYSTEM 1 MG/1
1 PATCH, EXTENDED RELEASE TRANSDERMAL
Status: DISCONTINUED | OUTPATIENT
Start: 2023-07-20 | End: 2023-07-20

## 2023-07-20 RX ORDER — SODIUM CHLORIDE, SODIUM LACTATE, POTASSIUM CHLORIDE, CALCIUM CHLORIDE 600; 310; 30; 20 MG/100ML; MG/100ML; MG/100ML; MG/100ML
1000 INJECTION, SOLUTION INTRAVENOUS ONCE
Status: COMPLETED | OUTPATIENT
Start: 2023-07-20 | End: 2023-07-20

## 2023-07-20 RX ADMIN — SODIUM CHLORIDE, POTASSIUM CHLORIDE, SODIUM LACTATE AND CALCIUM CHLORIDE 1000 ML: 600; 310; 30; 20 INJECTION, SOLUTION INTRAVENOUS at 04:47

## 2023-07-20 RX ADMIN — POTASSIUM CHLORIDE, DEXTROSE MONOHYDRATE AND SODIUM CHLORIDE: 150; 5; 900 INJECTION, SOLUTION INTRAVENOUS at 20:05

## 2023-07-20 RX ADMIN — ACETAMINOPHEN 1000 MG: 500 TABLET, FILM COATED ORAL at 15:22

## 2023-07-20 RX ADMIN — ONDANSETRON 4 MG: 2 INJECTION INTRAMUSCULAR; INTRAVENOUS at 04:47

## 2023-07-20 RX ADMIN — ACETAMINOPHEN 1000 MG: 500 TABLET, FILM COATED ORAL at 23:11

## 2023-07-20 RX ADMIN — SODIUM CHLORIDE, POTASSIUM CHLORIDE, SODIUM LACTATE AND CALCIUM CHLORIDE: 600; 310; 30; 20 INJECTION, SOLUTION INTRAVENOUS at 12:27

## 2023-07-20 RX ADMIN — MORPHINE SULFATE 4 MG: 4 INJECTION, SOLUTION INTRAMUSCULAR; INTRAVENOUS at 04:47

## 2023-07-20 ASSESSMENT — LIFESTYLE VARIABLES
HAVE YOU EVER FELT YOU SHOULD CUT DOWN ON YOUR DRINKING: NO
HAVE PEOPLE ANNOYED YOU BY CRITICIZING YOUR DRINKING: NO
ON A TYPICAL DAY WHEN YOU DRINK ALCOHOL HOW MANY DRINKS DO YOU HAVE: 0
TOTAL SCORE: 0
AVERAGE NUMBER OF DAYS PER WEEK YOU HAVE A DRINK CONTAINING ALCOHOL: 0
HOW MANY TIMES IN THE PAST YEAR HAVE YOU HAD 5 OR MORE DRINKS IN A DAY: 0
TOTAL SCORE: 0
EVER HAD A DRINK FIRST THING IN THE MORNING TO STEADY YOUR NERVES TO GET RID OF A HANGOVER: NO
CONSUMPTION TOTAL: NEGATIVE
TOTAL SCORE: 0
EVER FELT BAD OR GUILTY ABOUT YOUR DRINKING: NO
DOES PATIENT WANT TO STOP DRINKING: NO
ALCOHOL_USE: NO

## 2023-07-20 ASSESSMENT — PATIENT HEALTH QUESTIONNAIRE - PHQ9
SUM OF ALL RESPONSES TO PHQ9 QUESTIONS 1 AND 2: 0
2. FEELING DOWN, DEPRESSED, IRRITABLE, OR HOPELESS: NOT AT ALL
2. FEELING DOWN, DEPRESSED, IRRITABLE, OR HOPELESS: NOT AT ALL
1. LITTLE INTEREST OR PLEASURE IN DOING THINGS: NOT AT ALL
1. LITTLE INTEREST OR PLEASURE IN DOING THINGS: NOT AT ALL
SUM OF ALL RESPONSES TO PHQ9 QUESTIONS 1 AND 2: 0

## 2023-07-20 ASSESSMENT — PAIN DESCRIPTION - PAIN TYPE
TYPE: ACUTE PAIN

## 2023-07-20 ASSESSMENT — FIBROSIS 4 INDEX: FIB4 SCORE: 1.62

## 2023-07-20 NOTE — ED NOTES
Phone report given to [facility] MARK ANTHONY Blount. Patient transport took patient to [facility] with chart and belongings. Patient care transferred. IVF continued to floor.

## 2023-07-20 NOTE — ED NOTES
Med Rec complete per patient  No oral antibiotics in the last 30 days  Allergies reviewed  Preferred Pharmacy: [facility] on [address]

## 2023-07-20 NOTE — LACTATION NOTE
Lactation support: SUHA gave Nathaniel RN from ED an electric breast pump with instructions for use. LC help RN sign out pump and ED had already ordered a kit. LC to follow up with patient later.   Patient is a 29 y/o P1 and 3 weeks post partum, who came to ED with radiating lower back pain and for r/o pancreatitis/ cholelithiasis.  Mom delivered baby boy and was pumping while baby was in the NICU for unstable sugars.  Mom will possibly be needing surgery and will be pumping her breasts Q3 hours for baby.    1500 - Patient was transferred to CDU and order was placed for lactation. Mom told LC that her kit was thrown away in the ED  and milk was discarded. SUHA instructed CDU staff present  to store in bottles provided and gave information on pump cleaning, collection and storage. Mom reviewed her settings with SUHA and LC placed the settings on her white board. SUHA encouraged mother to remember her pump and parts if transferred to another unit.  LC will follow up in the morning

## 2023-07-20 NOTE — PROGRESS NOTES
2 RN Skin Assessment Completed by Janneth RN and Mine RN.     Head: WDL  Ears: WDL  Nose: WDL  Mouth: WDL  Neck: WDL  Breasts/Chest: WDL  Shoulder Blades: WDL  Spine: WDL  (R) Arm/Elbow/hand: WDL  (L) Arm/Elbow/hand: WDL  Abdomen: incision, (post 2 weeks)  Groin: WDL  Sacrum/Coccyx/Buttocks: blanching  (R) Leg: WDL  (L) Leg: WDL  (R) Heel/Foot/Toe: blanching  (L) Heel/Foot/Toe: blanching              Devices in place: BP Cuff and Pulse Ox     Interventions in place: Pillows     Possible skin injury found: No     Pictures uploaded into Epic: N/A  Wound Consult Placed: N/A

## 2023-07-20 NOTE — H&P
DATE OF CONSULTATION:  7/20/2023     REFERRING PHYSICIAN:   Chyna Goss M.D.     CONSULTING PHYSICIAN:  Ebony Olivo M.D.     REASON FOR CONSULTATION:  I have been asked by  to see the patient in surgical consultation for evaluation of gallstone pancreatitis.    HISTORY OF PRESENT ILLNESS: The patient is a healthy 28 year old woman who presents with epigastric pain, nausea, vomiting.  She has had intermittent pain like this ever since the seventh month of her pregnancy.  She came to the emergency department but did not have any further testing at that time and she did not think much of it because the pain kept going away.  However last night the pain never went away and she developed vomiting and so she came in for further evaluation.  She is now 2 weeks postpartum.  She has had 3 C-sections in the past and has not had any problems with bleeding or anesthesia.      PAST MEDICAL HISTORY:  has a past medical history of Anemia, Anxiety, Depression, GDM (gestational diabetes mellitus) (10/13/2020), Heart burn, Pregnant, and Snoring.    She has no past medical history of Addisons disease ([facility]), Adrenal disorder ([facility]), Allergy, Arrhythmia, Arthritis, Asthma, Blood transfusion without reported diagnosis, Cancer ([facility]), Cataract, CHF (congestive heart failure) ([facility]), Clotting disorder (HCC), COPD (chronic obstructive pulmonary disease) ([facility]), Cushings syndrome ([facility]), Diabetic neuropathy ([facility]), GERD (gastroesophageal reflux disease), Glaucoma, Goiter, Head ache, Heart attack (HCC), Heart murmur, HIV (human immunodeficiency virus infection) ([facility]), Hyperlipidemia, Hypertension, IBD (inflammatory bowel disease), Kidney disease, Meningitis, Migraine, Muscle disorder, Osteoporosis, Parathyroid disorder (HCC), Pituitary disease (HCC), Pulmonary emphysema (HCC), Seizure (HCC), Stroke (HCC), Substance abuse (HCC), Thyroid disease, Tuberculosis, or Urinary tract infection.    PAST SURGICAL HISTORY:  has a past  surgical history that includes primary c section (12/19/2016); repeat c section (N/A, 12/02/2020); and repeat c section (Bilateral, 7/2/2023).    ALLERGIES: No Known Allergies    CURRENT MEDICATIONS:    Home Medications       Reviewed by Humberto Kendrick (Pharmacy Tech) on 07/20/23 at 0543  Med List Status: Complete     Medication Last Dose Status   acetaminophen (TYLENOL) 500 MG Tab 7/19/2023 Active   Prenatal MV-Min-Fe Fum-FA-DHA (PRENATAL 1 PO) 7/19/2023 Active                    FAMILY HISTORY: family history includes No Known Problems in her brother, father, and mother; Seizures in her maternal grandmother.    SOCIAL HISTORY:  reports that she has never smoked. She has never used smokeless tobacco. She reports that she does not drink alcohol and does not use drugs.    REVIEW OF SYSTEMS: Review of systems is remarkable for the following epigastric pain. The remainder of the comprehensive ROS is negative with the exception of the aforementioned HPI, PMH, and PSH bullets in accordance with CMS guideline.    PHYSICAL EXAMINATION:    Physical Exam  Constitutional:       Appearance: Normal appearance.   HENT:      Head: Normocephalic and atraumatic.      Right Ear: External ear normal.      Left Ear: External ear normal.      Nose: Nose normal.      Mouth/Throat:      Mouth: Mucous membranes are moist.   Eyes:      Extraocular Movements: Extraocular movements intact.   Cardiovascular:      Rate and Rhythm: Normal rate and regular rhythm.   Pulmonary:      Effort: Pulmonary effort is normal.   Abdominal:      General: Abdomen is flat. There is no distension.      Tenderness: There is no abdominal tenderness.   Skin:     General: Skin is warm and dry.      Capillary Refill: Capillary refill takes less than 2 seconds.   Neurological:      General: No focal deficit present.      Mental Status: She is alert and oriented to person, place, and time.   Psychiatric:         Mood and Affect: Mood normal.         Behavior:  Behavior normal.         LABORATORY VALUES:   Recent Labs     07/20/23  0301   WBC 8.6   RBC 4.84   HEMOGLOBIN 13.5   HEMATOCRIT 40.4   MCV 83.5   MCH 27.9   MCHC 33.4   RDW 40.8   PLATELETCT 429   MPV 10.8     Recent Labs     07/20/23  0301   SODIUM 138   POTASSIUM 3.9   CHLORIDE 103   CO2 18*   GLUCOSE 112*   BUN 13   CREATININE 0.58   CALCIUM 9.5     Recent Labs     07/20/23  0301   ASTSGOT 330*   ALTSGPT 164*   TBILIRUBIN 1.3   ALKPHOSPHAT 304*   GLOBULIN 3.7*            IMAGING:   [id number]   Final Result      1.  There is mild edema in the distal pancreatic body and tail with a small amount of surrounding fluid consistent with acute pancreatitis. No pancreatic ductal dilatation.   2.  There is cholelithiasis with mild gallbladder wall thickening and borderline common bile duct dilatation which could represent cholecystitis in the appropriate clinical setting.   3.  No obstructive choledocholithiasis.      US-RUQ   Final Result         1.  Cholelithiasis with borderline gallbladder wall thickening and positive sonographic Layton sign, findings concerning for evolving cholecystitis.   2.  Common bile duct dilatation, concerning for biliary obstruction. Could be further evaluated with ERCP or MRCP.        On my review of the images the patient has cholelithiasis and a 2 mm gallbladder wall.  She has fluid surrounding the pancreatic body consistent with her pancreatitis.  MRI shows no choledocholithiasis.  ASSESSMENT AND PLAN:   28-year-old woman with gallstone pancreatitis, no retained choledocholithiasis  We discussed that she will need to have her pancreatitis resolve prior to attempting a cholecystectomy.  This may be able to be done tomorrow or the next day.  We discussed that a cholecystectomy will prevent further episodes of this happening.  She is prepared to stay 2 to 3 days in the hospital.  All of her questions were answered to her apparent satisfaction and she agreed to proceed.    DISPOSITION:  [facility] [place] [facility].     ____________________________________     Ebony Olivo M.D.    DD: 7/20/2023  9:31 AM    AAST Grading System for EGS Conditions  ACS NSQIP Surgical Risk Calculator

## 2023-07-20 NOTE — ED PROVIDER NOTES
ED Provider Note    CHIEF COMPLAINT  Chief Complaint   Patient presents with    Back Pain     R mid/lower back pain X 2 days, sharp, 5/10, worse w/ inspiration and lying flat, radiates around back and to abdomen just below rib cage    Dizziness     And weakness       EXTERNAL RECORDS REVIEWED  Patient's last encounter was a hospital admission early this month for labor and delivery at 39 weeks 1 day.  History of gestational diabetes and polyhydramnios.    Patient was evaluated twice earlier this year with back pain and pregnancy.    HPI/ROS  LIMITATION TO HISTORY   Select: : None  OUTSIDE HISTORIAN(S):  None    Johanny Hubbard is a 28 y.o. female who presents to the emergency department with a chief complaint of right-sided back pain that radiates forward to her right upper quadrant.  Patient started states it started when she was about 6 months pregnant.  She delivered 2 weeks ago.  She had a .  She had it on and off during pregnancy but was attributed to the pregnancy and it was never evaluated.  Now, it is worsening.  She has pain with deep inspiration and pain when she lays back.  No cough or fever.  She has had nausea associated with eating and this morning before she came here, she did have an episode of vomiting.  No diarrhea.  She has had 3 prior C-sections in the past.  She is otherwise healthy with no past medical history.    PAST MEDICAL HISTORY   has a past medical history of Anemia, Anxiety, Depression, GDM (gestational diabetes mellitus) (10/13/2020), Heart burn, Pregnant, and Snoring.    SURGICAL HISTORY   has a past surgical history that includes primary c section (2016); repeat c section (N/A, 2020); and repeat c section (Bilateral, 2023).    FAMILY HISTORY  Family History   Problem Relation Age of Onset    No Known Problems Mother     No Known Problems Father     No Known Problems Brother     Seizures Maternal Grandmother        SOCIAL HISTORY  Social History  "    Tobacco Use    Smoking status: Never    Smokeless tobacco: Never   Vaping Use    Vaping Use: Never used   Substance and Sexual Activity    Alcohol use: No    Drug use: No    Sexual activity: Yes     Partners: Male     Birth control/protection: None, I.U.D.     Comment: Pt states had Mirena removed 10/2022       CURRENT MEDICATIONS  Home Medications       Reviewed by Humberto Kendrick (Pharmacy Tech) on 07/20/23 at 0543  Med List Status: Complete     Medication Last Dose Status   acetaminophen (TYLENOL) 500 MG Tab 7/19/2023 Active   Prenatal MV-Min-Fe Fum-FA-DHA (PRENATAL 1 PO) 7/19/2023 Active                    ALLERGIES  No Known Allergies    PHYSICAL EXAM  VITAL SIGNS: /73   Pulse 60   Temp 35.9 °C (96.7 °F) (Temporal)   Resp 14   Ht 1.473 m (4' 10\")   Wt 67.4 kg (148 lb 9.4 oz)   LMP 10/01/2022 (Exact Date)   SpO2 94%   Breastfeeding Yes   BMI 31.06 kg/m²    Vitals reviewed.  Constitutional: Patient is oriented to person, place, and time. Appears well-developed and well-nourished. Mild distress.    Head: Normocephalic and atraumatic.   Mouth/Throat: Oropharynx is clear and moist  Eyes: Conjunctivae are normal. Pupils are equal, round  Neck: Normal range of motion.   Cardiovascular: Normal rate, regular rhythm and normal heart sounds.   Pulmonary/Chest: Effort normal and breath sounds normal. No respiratory distress, no wheezes, rhonchi, or rales. No chest wall tenderness.  Abdominal: Soft. Bowel sounds are normal. There is upper abd tenderness, worst in the RUQ. No rebound or guarding, or peritoneal signs. No CVA tenderness.  Musculoskeletal: No edema and no tenderness. Right  upper upper back pain  Neurological: No cranial nerve deficits. Normal motor and sensory exam. No focal deficits.   Skin: Skin is warm and dry. No erythema. No pallor.   Psychiatric: Patient has a normal mood and affect.     DIAGNOSTIC STUDIES / PROCEDURES    LABS  Results for orders placed or performed during the " hospital encounter of 07/20/23   CBC WITH DIFFERENTIAL   Result Value Ref Range    WBC 8.6 4.8 - 10.8 K/uL    RBC 4.84 4.20 - 5.40 M/uL    Hemoglobin 13.5 12.0 - 16.0 g/dL    Hematocrit 40.4 37.0 - 47.0 %    MCV 83.5 81.4 - 97.8 fL    MCH 27.9 27.0 - 33.0 pg    MCHC 33.4 32.2 - 35.5 g/dL    RDW 40.8 35.9 - 50.0 fL    Platelet Count 429 164 - 446 K/uL    MPV 10.8 9.0 - 12.9 fL    Neutrophils-Polys 72.10 (H) 44.00 - 72.00 %    Lymphocytes 18.60 (L) 22.00 - 41.00 %    Monocytes 8.30 0.00 - 13.40 %    Eosinophils 0.20 0.00 - 6.90 %    Basophils 0.40 0.00 - 1.80 %    Immature Granulocytes 0.40 0.00 - 0.90 %    Nucleated RBC 0.00 0.00 - 0.20 /100 WBC    Neutrophils (Absolute) 6.17 1.82 - 7.42 K/uL    Lymphs (Absolute) 1.59 1.00 - 4.80 K/uL    Monos (Absolute) 0.71 0.00 - 0.85 K/uL    Eos (Absolute) 0.02 0.00 - 0.51 K/uL    Baso (Absolute) 0.03 0.00 - 0.12 K/uL    Immature Granulocytes (abs) 0.03 0.00 - 0.11 K/uL    NRBC (Absolute) 0.00 K/uL   COMP METABOLIC PANEL   Result Value Ref Range    Sodium 138 135 - 145 mmol/L    Potassium 3.9 3.6 - 5.5 mmol/L    Chloride 103 96 - 112 mmol/L    Co2 18 (L) 20 - 33 mmol/L    Anion Gap 17.0 (H) 7.0 - 16.0    Glucose 112 (H) 65 - 99 mg/dL    Bun 13 8 - 22 mg/dL    Creatinine 0.58 0.50 - 1.40 mg/dL    Calcium 9.5 8.5 - 10.5 mg/dL    AST(SGOT) 330 (H) 12 - 45 U/L    ALT(SGPT) 164 (H) 2 - 50 U/L    Alkaline Phosphatase 304 (H) 30 - 99 U/L    Total Bilirubin 1.3 0.1 - 1.5 mg/dL    Albumin 4.3 3.2 - 4.9 g/dL    Total Protein 8.0 6.0 - 8.2 g/dL    Globulin 3.7 (H) 1.9 - 3.5 g/dL    A-G Ratio 1.2 g/dL   LIPASE   Result Value Ref Range    Lipase >3000 (H) 11 - 82 U/L   HCG QUAL SERUM   Result Value Ref Range    Beta-Hcg Qualitative Serum Negative Negative   URINALYSIS,CULTURE IF INDICATED    Specimen: Urine, Clean Catch   Result Value Ref Range    Color DK Yellow     Character Clear     Specific Gravity 1.017 <1.035    Ph 7.0 5.0 - 8.0    Glucose Negative Negative mg/dL    Ketones Negative  Negative mg/dL    Protein 30 (A) Negative mg/dL    Bilirubin Negative Negative    Urobilinogen, Urine 1.0 Negative    Nitrite Negative Negative    Leukocyte Esterase Moderate (A) Negative    Occult Blood Trace (A) Negative    Micro Urine Req Microscopic    CORRECTED CALCIUM   Result Value Ref Range    Correct Calcium 9.3 8.5 - 10.5 mg/dL   ESTIMATED GFR   Result Value Ref Range    GFR (CKD-EPI) 127 >60 mL/min/1.73 m 2   URINE MICROSCOPIC (W/UA)   Result Value Ref Range    WBC 2-5 /hpf    RBC 0-2 /hpf    Bacteria Few (A) None /hpf    Epithelial Cells Negative /hpf    Hyaline Cast 0-2 /lpf       RADIOLOGY    Radiologist interpretation:   [id number]   Final Result      1.  There is mild edema in the distal pancreatic body and tail with a small amount of surrounding fluid consistent with acute pancreatitis. No pancreatic ductal dilatation.   2.  There is cholelithiasis with mild gallbladder wall thickening and borderline common bile duct dilatation which could represent cholecystitis in the appropriate clinical setting.   3.  No obstructive choledocholithiasis.      US-RUQ   Final Result         1.  Cholelithiasis with borderline gallbladder wall thickening and positive sonographic Layton sign, findings concerning for evolving cholecystitis.   2.  Common bile duct dilatation, concerning for biliary obstruction. Could be further evaluated with ERCP or MRCP.          [id number]   Final Result      1.  There is mild edema in the distal pancreatic body and tail with a small amount of surrounding fluid consistent with acute pancreatitis. No pancreatic ductal dilatation.   2.  There is cholelithiasis with mild gallbladder wall thickening and borderline common bile duct dilatation which could represent cholecystitis in the appropriate clinical setting.   3.  No obstructive choledocholithiasis.      US-RUQ   Final Result         1.  Cholelithiasis with borderline gallbladder wall thickening and positive sonographic  Layton sign, findings concerning for evolving cholecystitis.   2.  Common bile duct dilatation, concerning for biliary obstruction. Could be further evaluated with ERCP or MRCP.          COURSE & MEDICAL DECISION MAKING    ED Observation Status? Yes; I am placing the patient in to an observation status due to a diagnostic uncertainty as well as therapeutic intensity. Patient placed in observation status at 4:06 AM, 2023.     Observation plan is as follows: Due to diagnostic uncertainty, possible need for further advanced imaging or surgical consultation, patient's placed in ED observation.    Upon Reevaluation, the patient's condition has: not improved; and will be escalated to hospitalization.    Patient discharged from ED Observation status at 1100AM (Time) 2023 (Date).     INITIAL ASSESSMENT, COURSE AND PLAN  Care Narrative:     This is a pleasant 28-year-old female.  She is 2 weeks postpartum.  She has undergone a .  She is complaining of upper abdominal pain, worst in the right upper quadrant.  She also has pain in her right back that radiates forward.  This is associated with some nausea.  No fever.  No cough.  Her vital signs are reassuring.  She is not hypoxic or tachypneic.  Has been afebrile.  High suspicion for gallbladder illness.  Have ordered labs and an ultrasound for further evaluation.  Patient had her  son with her at the bedside as well as her partner and I have suggested, given the patient's age, that he go home with dad so as not to expose him to various illnesses here in the ED.  Patient was gracious and agreeable.  She will be kept NPO.  She is nursing but the infant is also supplemented with formula.  She will be treated with pain medication and antiemetics before ultrasound evaluation.    0711AM Gen Surgery paged. Patient updated. Will need admit and npo status.    0720 D/W Dr. Olivo my general surgeon regarding patient's presentation, status post partum.   Labs.  She agrees to admit the patient.  Recommending proceeding with MRCP as pancreatitis will have to pool before any plan for surgery is undertaken.  Patient was updated on surgical mentations.  She was given an opportunity for questions which we discussed.  She is nontoxic in appearance.  Normal vital signs.  She is in stable condition.    The total critical care time on this patient is 40 minutes, resuscitating patient, speaking with admitting physician, and deciphering test results. This 40 minutes is exclusive of separately billable procedures.    HYDRATION: Based on the patient's presentation of Inability to take oral fluids the patient was given IV fluids. IV Hydration was used because oral hydration was not adequate alone. Upon recheck following hydration, the patient was improved but will still require hospitalization..        DISPOSITION AND DISCUSSIONS  I have discussed management of the patient with the following physicians and ANDRÉS's: General surgery    Discussion of management with other QHP or appropriate source(s): None    Escalation of care considered, and ultimately not performed: None    Barriers to care at this time, including but not limited to: None.     Decision tools and prescription drugs considered including, but not limited to: Not applicable at this time.    FINAL DIAGNOSIS  1. Gallstone pancreatitis    2. Pain of upper abdomen    3.   2 weeks post partum  Critical care time: 40 minutes    Electronically signed by: Chyna Goss D.O., 7/20/2023 4:03 AM

## 2023-07-20 NOTE — ED NOTES
Bedside report received from MARK ANTHONY Dye. Assumed care of patient. Patient aware of POC. Call light within reach.

## 2023-07-20 NOTE — PROGRESS NOTES
Patient arrived to floor. Assumed care at 1605. Assessment complete, A&Ox4 Admission record complete, Pt oriented to room, educated on call light/extension/phone system, white board updated. patient educated to call for assistance, pt verbalizes understanding. Pt c/o 5/10 Medicated per MAR;all needs at this time met. Questions and concerns addressed. Call light, phone, and personal belongings within reach.

## 2023-07-20 NOTE — ED TRIAGE NOTES
Chief Complaint   Patient presents with    Back Pain     R mid/lower back pain X 2 days, sharp, 5/10, worse w/ inspiration and lying flat, radiates around back and to abdomen just below rib cage    Dizziness     And weakness       Pt ambulatory to triage for above complaint. Recent pregnancy w/ c section without complications on 7/2. Pain would come and go for 2 days but has been persistent since 1800 last night. VSS, in NAD

## 2023-07-20 NOTE — PROGRESS NOTES
Patient in MRI. Admission orders placed.   Healthy 28 year old woman with gallstone pancreatitis.   Cannot do an IOC due to active pancreatitis, MRCP in progress.   Will call GI if MRCP shows choledocholithiasis, otherwise will wait until pancreatitis resolves and do cholecystectomy prior to discharge.            ____________________________________     Ebony Olivo M.D.    DD: 7/20/2023  8:22 AM

## 2023-07-21 ENCOUNTER — ANESTHESIA (OUTPATIENT)
Dept: SURGERY | Facility: MEDICAL CENTER | Age: 28
End: 2023-07-21
Payer: MEDICAID

## 2023-07-21 ENCOUNTER — ANESTHESIA EVENT (OUTPATIENT)
Dept: SURGERY | Facility: MEDICAL CENTER | Age: 28
End: 2023-07-21
Payer: MEDICAID

## 2023-07-21 LAB
ALBUMIN SERPL BCP-MCNC: 3.7 G/DL (ref 3.2–4.9)
ALBUMIN/GLOB SERPL: 1.2 G/DL
ALP SERPL-CCNC: 238 U/L (ref 30–99)
ALT SERPL-CCNC: 105 U/L (ref 2–50)
ANION GAP SERPL CALC-SCNC: 8 MMOL/L (ref 7–16)
AST SERPL-CCNC: 92 U/L (ref 12–45)
BILIRUB SERPL-MCNC: 0.4 MG/DL (ref 0.1–1.5)
BUN SERPL-MCNC: 9 MG/DL (ref 8–22)
CALCIUM ALBUM COR SERPL-MCNC: 8.4 MG/DL (ref 8.5–10.5)
CALCIUM SERPL-MCNC: 8.2 MG/DL (ref 8.5–10.5)
CHLORIDE SERPL-SCNC: 103 MMOL/L (ref 96–112)
CO2 SERPL-SCNC: 25 MMOL/L (ref 20–33)
CREAT SERPL-MCNC: 0.53 MG/DL (ref 0.5–1.4)
ERYTHROCYTE [DISTWIDTH] IN BLOOD BY AUTOMATED COUNT: 43.2 FL (ref 35.9–50)
GFR SERPLBLD CREATININE-BSD FMLA CKD-EPI: 130 ML/MIN/1.73 M 2
GLOBULIN SER CALC-MCNC: 3 G/DL (ref 1.9–3.5)
GLUCOSE SERPL-MCNC: 92 MG/DL (ref 65–99)
HCT VFR BLD AUTO: 37.3 % (ref 37–47)
HGB BLD-MCNC: 11.9 G/DL (ref 12–16)
LIPASE SERPL-CCNC: 351 U/L (ref 11–82)
MCH RBC QN AUTO: 27.9 PG (ref 27–33)
MCHC RBC AUTO-ENTMCNC: 31.9 G/DL (ref 32.2–35.5)
MCV RBC AUTO: 87.4 FL (ref 81.4–97.8)
PATHOLOGY CONSULT NOTE: NORMAL
PLATELET # BLD AUTO: 297 K/UL (ref 164–446)
PMV BLD AUTO: 10.5 FL (ref 9–12.9)
POTASSIUM SERPL-SCNC: 4.4 MMOL/L (ref 3.6–5.5)
PROT SERPL-MCNC: 6.7 G/DL (ref 6–8.2)
RBC # BLD AUTO: 4.27 M/UL (ref 4.2–5.4)
SODIUM SERPL-SCNC: 136 MMOL/L (ref 135–145)
WBC # BLD AUTO: 5.3 K/UL (ref 4.8–10.8)

## 2023-07-21 PROCEDURE — 700101 HCHG RX REV CODE 250: Performed by: ANESTHESIOLOGY

## 2023-07-21 PROCEDURE — 96376 TX/PRO/DX INJ SAME DRUG ADON: CPT | Mod: XU

## 2023-07-21 PROCEDURE — 160029 HCHG SURGERY MINUTES - 1ST 30 MINS LEVEL 4: Performed by: SURGERY

## 2023-07-21 PROCEDURE — 80053 COMPREHEN METABOLIC PANEL: CPT

## 2023-07-21 PROCEDURE — 700111 HCHG RX REV CODE 636 W/ 250 OVERRIDE (IP): Mod: JZ | Performed by: SURGERY

## 2023-07-21 PROCEDURE — 700101 HCHG RX REV CODE 250: Performed by: SURGERY

## 2023-07-21 PROCEDURE — 96372 THER/PROPH/DIAG INJ SC/IM: CPT | Mod: XU

## 2023-07-21 PROCEDURE — 160009 HCHG ANES TIME/MIN: Performed by: SURGERY

## 2023-07-21 PROCEDURE — 160035 HCHG PACU - 1ST 60 MINS PHASE I: Performed by: SURGERY

## 2023-07-21 PROCEDURE — 88304 TISSUE EXAM BY PATHOLOGIST: CPT

## 2023-07-21 PROCEDURE — 160002 HCHG RECOVERY MINUTES (STAT): Performed by: SURGERY

## 2023-07-21 PROCEDURE — 700111 HCHG RX REV CODE 636 W/ 250 OVERRIDE (IP): Performed by: ANESTHESIOLOGY

## 2023-07-21 PROCEDURE — 47562 LAPAROSCOPIC CHOLECYSTECTOMY: CPT | Performed by: SURGERY

## 2023-07-21 PROCEDURE — 160048 HCHG OR STATISTICAL LEVEL 1-5: Performed by: SURGERY

## 2023-07-21 PROCEDURE — 700102 HCHG RX REV CODE 250 W/ 637 OVERRIDE(OP): Mod: UD | Performed by: SURGERY

## 2023-07-21 PROCEDURE — A9270 NON-COVERED ITEM OR SERVICE: HCPCS | Mod: UD | Performed by: ANESTHESIOLOGY

## 2023-07-21 PROCEDURE — 83690 ASSAY OF LIPASE: CPT

## 2023-07-21 PROCEDURE — G0378 HOSPITAL OBSERVATION PER HR: HCPCS

## 2023-07-21 PROCEDURE — 160041 HCHG SURGERY MINUTES - EA ADDL 1 MIN LEVEL 4: Performed by: SURGERY

## 2023-07-21 PROCEDURE — A9270 NON-COVERED ITEM OR SERVICE: HCPCS | Mod: UD | Performed by: SURGERY

## 2023-07-21 PROCEDURE — 700102 HCHG RX REV CODE 250 W/ 637 OVERRIDE(OP): Mod: UD | Performed by: ANESTHESIOLOGY

## 2023-07-21 PROCEDURE — 85027 COMPLETE CBC AUTOMATED: CPT

## 2023-07-21 PROCEDURE — 00790 ANES IPER UPR ABD NOS: CPT | Performed by: ANESTHESIOLOGY

## 2023-07-21 RX ORDER — OXYCODONE HCL 5 MG/5 ML
10 SOLUTION, ORAL ORAL
Status: COMPLETED | OUTPATIENT
Start: 2023-07-21 | End: 2023-07-21

## 2023-07-21 RX ORDER — ONDANSETRON 2 MG/ML
4 INJECTION INTRAMUSCULAR; INTRAVENOUS
Status: DISCONTINUED | OUTPATIENT
Start: 2023-07-21 | End: 2023-07-21 | Stop reason: HOSPADM

## 2023-07-21 RX ORDER — BUPIVACAINE HYDROCHLORIDE AND EPINEPHRINE 5; 5 MG/ML; UG/ML
INJECTION, SOLUTION EPIDURAL; INTRACAUDAL; PERINEURAL
Status: DISCONTINUED | OUTPATIENT
Start: 2023-07-21 | End: 2023-07-21 | Stop reason: HOSPADM

## 2023-07-21 RX ORDER — OXYCODONE HCL 5 MG/5 ML
5 SOLUTION, ORAL ORAL
Status: COMPLETED | OUTPATIENT
Start: 2023-07-21 | End: 2023-07-21

## 2023-07-21 RX ORDER — IPRATROPIUM BROMIDE AND ALBUTEROL SULFATE 2.5; .5 MG/3ML; MG/3ML
3 SOLUTION RESPIRATORY (INHALATION)
Status: DISCONTINUED | OUTPATIENT
Start: 2023-07-21 | End: 2023-07-21 | Stop reason: HOSPADM

## 2023-07-21 RX ORDER — DIPHENHYDRAMINE HYDROCHLORIDE 50 MG/ML
12.5 INJECTION INTRAMUSCULAR; INTRAVENOUS
Status: DISCONTINUED | OUTPATIENT
Start: 2023-07-21 | End: 2023-07-21 | Stop reason: HOSPADM

## 2023-07-21 RX ORDER — HYDROMORPHONE HYDROCHLORIDE 1 MG/ML
0.1 INJECTION, SOLUTION INTRAMUSCULAR; INTRAVENOUS; SUBCUTANEOUS
Status: DISCONTINUED | OUTPATIENT
Start: 2023-07-21 | End: 2023-07-21 | Stop reason: HOSPADM

## 2023-07-21 RX ORDER — EPHEDRINE SULFATE 50 MG/ML
5 INJECTION, SOLUTION INTRAVENOUS
Status: DISCONTINUED | OUTPATIENT
Start: 2023-07-21 | End: 2023-07-21 | Stop reason: HOSPADM

## 2023-07-21 RX ORDER — ROCURONIUM BROMIDE 10 MG/ML
INJECTION, SOLUTION INTRAVENOUS PRN
Status: DISCONTINUED | OUTPATIENT
Start: 2023-07-21 | End: 2023-07-21 | Stop reason: SURG

## 2023-07-21 RX ORDER — HYDRALAZINE HYDROCHLORIDE 20 MG/ML
5 INJECTION INTRAMUSCULAR; INTRAVENOUS
Status: DISCONTINUED | OUTPATIENT
Start: 2023-07-21 | End: 2023-07-21 | Stop reason: HOSPADM

## 2023-07-21 RX ORDER — HYDROMORPHONE HYDROCHLORIDE 1 MG/ML
0.4 INJECTION, SOLUTION INTRAMUSCULAR; INTRAVENOUS; SUBCUTANEOUS
Status: DISCONTINUED | OUTPATIENT
Start: 2023-07-21 | End: 2023-07-21 | Stop reason: HOSPADM

## 2023-07-21 RX ORDER — ONDANSETRON 2 MG/ML
INJECTION INTRAMUSCULAR; INTRAVENOUS PRN
Status: DISCONTINUED | OUTPATIENT
Start: 2023-07-21 | End: 2023-07-21 | Stop reason: SURG

## 2023-07-21 RX ORDER — METOPROLOL TARTRATE 1 MG/ML
1 INJECTION, SOLUTION INTRAVENOUS
Status: DISCONTINUED | OUTPATIENT
Start: 2023-07-21 | End: 2023-07-21 | Stop reason: HOSPADM

## 2023-07-21 RX ORDER — LABETALOL HYDROCHLORIDE 5 MG/ML
5 INJECTION, SOLUTION INTRAVENOUS
Status: DISCONTINUED | OUTPATIENT
Start: 2023-07-21 | End: 2023-07-21 | Stop reason: HOSPADM

## 2023-07-21 RX ORDER — MIDAZOLAM HYDROCHLORIDE 1 MG/ML
INJECTION INTRAMUSCULAR; INTRAVENOUS PRN
Status: DISCONTINUED | OUTPATIENT
Start: 2023-07-21 | End: 2023-07-21 | Stop reason: SURG

## 2023-07-21 RX ORDER — DEXAMETHASONE SODIUM PHOSPHATE 4 MG/ML
INJECTION, SOLUTION INTRA-ARTICULAR; INTRALESIONAL; INTRAMUSCULAR; INTRAVENOUS; SOFT TISSUE PRN
Status: DISCONTINUED | OUTPATIENT
Start: 2023-07-21 | End: 2023-07-21 | Stop reason: SURG

## 2023-07-21 RX ORDER — CEFAZOLIN SODIUM 1 G/3ML
INJECTION, POWDER, FOR SOLUTION INTRAMUSCULAR; INTRAVENOUS PRN
Status: DISCONTINUED | OUTPATIENT
Start: 2023-07-21 | End: 2023-07-21 | Stop reason: SURG

## 2023-07-21 RX ORDER — HALOPERIDOL 5 MG/ML
1 INJECTION INTRAMUSCULAR
Status: DISCONTINUED | OUTPATIENT
Start: 2023-07-21 | End: 2023-07-21 | Stop reason: HOSPADM

## 2023-07-21 RX ORDER — KETOROLAC TROMETHAMINE 30 MG/ML
INJECTION, SOLUTION INTRAMUSCULAR; INTRAVENOUS PRN
Status: DISCONTINUED | OUTPATIENT
Start: 2023-07-21 | End: 2023-07-21 | Stop reason: SURG

## 2023-07-21 RX ORDER — MEPERIDINE HYDROCHLORIDE 25 MG/ML
12.5 INJECTION INTRAMUSCULAR; INTRAVENOUS; SUBCUTANEOUS
Status: DISCONTINUED | OUTPATIENT
Start: 2023-07-21 | End: 2023-07-21 | Stop reason: HOSPADM

## 2023-07-21 RX ORDER — HYDROMORPHONE HYDROCHLORIDE 1 MG/ML
0.2 INJECTION, SOLUTION INTRAMUSCULAR; INTRAVENOUS; SUBCUTANEOUS
Status: DISCONTINUED | OUTPATIENT
Start: 2023-07-21 | End: 2023-07-21 | Stop reason: HOSPADM

## 2023-07-21 RX ORDER — LIDOCAINE HYDROCHLORIDE 20 MG/ML
INJECTION, SOLUTION EPIDURAL; INFILTRATION; INTRACAUDAL; PERINEURAL PRN
Status: DISCONTINUED | OUTPATIENT
Start: 2023-07-21 | End: 2023-07-21 | Stop reason: SURG

## 2023-07-21 RX ADMIN — ONDANSETRON 4 MG: 2 INJECTION INTRAMUSCULAR; INTRAVENOUS at 13:13

## 2023-07-21 RX ADMIN — FENTANYL CITRATE 100 MCG: 50 INJECTION, SOLUTION INTRAMUSCULAR; INTRAVENOUS at 12:34

## 2023-07-21 RX ADMIN — ONDANSETRON 4 MG: 2 INJECTION INTRAMUSCULAR; INTRAVENOUS at 14:59

## 2023-07-21 RX ADMIN — ACETAMINOPHEN 1000 MG: 500 TABLET, FILM COATED ORAL at 17:38

## 2023-07-21 RX ADMIN — OXYCODONE HYDROCHLORIDE 5 MG: 5 SOLUTION ORAL at 13:40

## 2023-07-21 RX ADMIN — PROPOFOL 170 MG: 10 INJECTION, EMULSION INTRAVENOUS at 12:36

## 2023-07-21 RX ADMIN — ROCURONIUM BROMIDE 50 MG: 50 INJECTION, SOLUTION INTRAVENOUS at 12:36

## 2023-07-21 RX ADMIN — CEFAZOLIN 2 G: 1 INJECTION, POWDER, FOR SOLUTION INTRAMUSCULAR; INTRAVENOUS at 12:45

## 2023-07-21 RX ADMIN — KETOROLAC TROMETHAMINE 30 MG: 30 INJECTION, SOLUTION INTRAMUSCULAR; INTRAVENOUS at 13:13

## 2023-07-21 RX ADMIN — DEXAMETHASONE SODIUM PHOSPHATE 8 MG: 4 INJECTION INTRA-ARTICULAR; INTRALESIONAL; INTRAMUSCULAR; INTRAVENOUS; SOFT TISSUE at 12:51

## 2023-07-21 RX ADMIN — SUGAMMADEX 200 MG: 100 INJECTION, SOLUTION INTRAVENOUS at 13:29

## 2023-07-21 RX ADMIN — OXYCODONE HYDROCHLORIDE 10 MG: 10 TABLET ORAL at 14:54

## 2023-07-21 RX ADMIN — POTASSIUM CHLORIDE, DEXTROSE MONOHYDRATE AND SODIUM CHLORIDE: 150; 5; 900 INJECTION, SOLUTION INTRAVENOUS at 17:42

## 2023-07-21 RX ADMIN — LIDOCAINE HYDROCHLORIDE 100 MG: 20 INJECTION, SOLUTION EPIDURAL; INFILTRATION; INTRACAUDAL at 12:36

## 2023-07-21 RX ADMIN — ENOXAPARIN SODIUM 40 MG: 100 INJECTION SUBCUTANEOUS at 17:39

## 2023-07-21 RX ADMIN — MIDAZOLAM 2 MG: 1 INJECTION, SOLUTION INTRAMUSCULAR; INTRAVENOUS at 12:29

## 2023-07-21 RX ADMIN — POTASSIUM CHLORIDE, DEXTROSE MONOHYDRATE AND SODIUM CHLORIDE: 150; 5; 900 INJECTION, SOLUTION INTRAVENOUS at 05:39

## 2023-07-21 RX ADMIN — ACETAMINOPHEN 1000 MG: 500 TABLET, FILM COATED ORAL at 23:08

## 2023-07-21 RX ADMIN — ACETAMINOPHEN 1000 MG: 500 TABLET, FILM COATED ORAL at 05:38

## 2023-07-21 ASSESSMENT — PAIN DESCRIPTION - PAIN TYPE
TYPE: SURGICAL PAIN
TYPE: SURGICAL PAIN
TYPE: ACUTE PAIN
TYPE: SURGICAL PAIN
TYPE: SURGICAL PAIN

## 2023-07-21 ASSESSMENT — PAIN SCALES - GENERAL: PAIN_LEVEL: 2

## 2023-07-21 NOTE — OR NURSING
Receievd patient from T214 on gurney alert , oriented x 4 ambulatory . For Lap sharita under Dr Rosado, Consent sigend and verified . Triple A's done . With ongoing IVF of Kcl at right Fa g 20 infusing well.   Waiting for md.

## 2023-07-21 NOTE — ANESTHESIA PROCEDURE NOTES
Airway    Date/Time: 7/21/2023 12:39 PM    Performed by: Rodney Elena M.D.  Authorized by: Rodney Elena M.D.    Location:  OR  Urgency:  Elective  Difficult Airway: No    Indications for Airway Management:  Anesthesia      Spontaneous Ventilation: absent    Sedation Level:  Deep  Preoxygenated: Yes    Patient Position:  Sniffing  Mask Difficulty Assessment:  1 - vent by mask  Final Airway Type:  Endotracheal airway  Final Endotracheal Airway:  ETT  Cuffed: Yes    Technique Used for Successful ETT Placement:  Direct laryngoscopy    Insertion Site:  Oral  Blade Type:  [place]  Laryngoscope Blade/Videolaryngoscope Blade Size:  2  ETT Size (mm):  7.0  Measured from:  Teeth  ETT to Teeth (cm):  22  Placement Verified by: auscultation and capnometry    Cormack-Lehane Classification:  Grade I - full view of glottis  Number of Attempts at Approach:  1

## 2023-07-21 NOTE — PROGRESS NOTES
Patient arrived to floor. Assumed care at ***. Assessment complete, A&Ox***  Admission record complete, Patient on monitor, Monitor room notified. Pt oriented to room, educated on call light/extension/phone system, white board updated. Fall assessment complete, patient educated to call for assistance, pt verbalizes understanding. Pt denies pain or any additional needs at this time. Questions and concerns addressed. Call light, phone, and personal belongings within reach.

## 2023-07-21 NOTE — PROGRESS NOTES
Alonzo at bedside. Plan for procedure today. Pt prepped, denies pain, nausea. No overnight events. All needs met and questions addressed.

## 2023-07-21 NOTE — ANESTHESIA TIME REPORT
Anesthesia Start and Stop Event Times     Date Time Event    7/21/2023 1206 Ready for Procedure     1226 Anesthesia Start     1343 Anesthesia Stop        Responsible Staff  07/21/23    Name Role Begin End    Rodney Elena M.D. Anesth 1226 1343        Overtime Reason:  no overtime (within assigned shift)    Comments:

## 2023-07-21 NOTE — CARE PLAN
Problem: Psychosocial  Goal: Patient's level of anxiety will decrease  Outcome: Progressing     Problem: Discharge Barriers/Planning  Goal: Patient's continuum of care needs are met  Outcome: Progressing     Problem: Mobility  Goal: Patient's capacity to carry out activities will improve  Outcome: Progressing     Problem: Self Care  Goal: Patient will have the ability to perform ADLs independently or with assistance (bathe, groom, dress, toilet and feed)  Outcome: Progressing   The patient is Stable - Low risk of patient condition declining or worsening    Shift Goals  Clinical Goals: Ana sheriff  Patient Goals: rest    Progress made toward(s) clinical / shift goals:  improving    Patient is not progressing towards the following goals:

## 2023-07-21 NOTE — ANESTHESIA POSTPROCEDURE EVALUATION
Patient: Johanny Hubbard    Procedure Summary     Date: 07/21/23 Room / Location: [facility] OR 09 / SURGERY [facility]    Anesthesia Start: 1226 Anesthesia Stop: 1343    Procedure: CHOLECYSTECTOMY, LAPAROSCOPIC (Abdomen) Diagnosis: (acute cholecystitis)    Surgeons: Lucian Rosado D.O. Responsible Provider: Rodney Elena M.D.    Anesthesia Type: general ASA Status: 2          Final Anesthesia Type: general  Last vitals  BP   Blood Pressure: 123/79    Temp   36.8 °C (98.3 °F)    Pulse   85   Resp   18    SpO2   99 %      Anesthesia Post Evaluation    Patient location during evaluation: PACU  Patient participation: complete - patient participated  Level of consciousness: awake and alert  Pain score: 2    Airway patency: patent  Anesthetic complications: no  Cardiovascular status: hemodynamically stable  Respiratory status: acceptable  Hydration status: euvolemic    PONV: none          There were no known notable events for this encounter.     Nurse Pain Score: 5 (NPRS)

## 2023-07-21 NOTE — PROGRESS NOTES
"      Better over night  Lipase down    /85   Pulse 67   Temp 36.5 °C (97.7 °F) (Temporal)   Resp 18   Ht 1.473 m (4' 10\")   Wt 67.5 kg (148 lb 13 oz)   LMP 10/01/2022 (Exact Date) Comment: 2 weeks postpartum   SpO2 95%   Breastfeeding Yes   BMI 31.10 kg/m²     NAD   No Jaundice  Abd soft, epigastric ttp    Recent Labs     23  0301 23  0256   WBC 8.6 5.3   RBC 4.84 4.27   HEMOGLOBIN 13.5 11.9*   HEMATOCRIT 40.4 37.3   MCV 83.5 87.4   MCH 27.9 27.9   RDW 40.8 43.2   PLATELETCT 429 297   MPV 10.8 10.5   NEUTSPOLYS 72.10*  --    LYMPHOCYTES 18.60*  --    MONOCYTES 8.30  --    EOSINOPHILS 0.20  --    BASOPHILS 0.40  --        Recent Labs     23  0301 23  0256   ASTSGOT 330* 92*   ALTSGPT 164* 105*   TBILIRUBIN 1.3 0.4   ALKPHOSPHAT 304* 238*   GLOBULIN 3.7* 3.0       Recent Labs     23  0301 23  0256   SODIUM 138 136   POTASSIUM 3.9 4.4   CHLORIDE 103 103   CO2 18* 25   GLUCOSE 112* 92   BUN 13 9   CREATININE 0.58 0.53     A/P  Gallstone pancreatitis, improving  Plan for lap sharita today  Procedure discussed with pt   Consent ordered  "

## 2023-07-21 NOTE — ANESTHESIA PREPROCEDURE EVALUATION
Case: [id number] Date/Time: 07/21/23 1139    Procedure: CHOLECYSTECTOMY, LAPAROSCOPIC    Pre-op diagnosis: acute cholecystitis    Location: [facility] OR 09 / SURGERY [facility]    Surgeons: Lucian Rosado D.O.          Relevant Problems   OB   (positive) GDM (gestational diabetes mellitus)       Physical Exam    Airway   Mallampati: II  TM distance: >3 FB  Neck ROM: full       Cardiovascular - normal exam  Rhythm: regular  Rate: normal  (-) murmur     Dental - normal exam           Pulmonary - normal exam  Breath sounds clear to auscultation     Abdominal    Neurological - normal exam                 Anesthesia Plan    ASA 2       Plan - general       Airway plan will be ETT          Induction: intravenous    Postoperative Plan: Postoperative administration of opioids is intended.    Pertinent diagnostic labs and testing reviewed    Informed Consent:    Anesthetic plan and risks discussed with patient.    Use of blood products discussed with: patient whom consented to blood products.

## 2023-07-21 NOTE — OP REPORT
Operative report    PreOp Diagnosis: Gallstone pancreatitis, chronic cholecystitis      PostOp Diagnosis: Same      Procedure(s):  CHOLECYSTECTOMY, LAPAROSCOPIC - Wound Class: Clean Contaminated    Surgeon(s):  Lucian Rosado D.O.    Anesthesiologist/Type of Anesthesia:  Anesthesiologist: Rodney Elena M.D./General    Surgical Staff:  Circulator: Meghna Leija R.N.  Relief Scrub: Bill Aranda  Scrub Person: Angel Ag    Specimens removed if any:  ID Type Source Tests Collected by Time Destination   A : gall bladder Other Other PATHOLOGY SPECIMEN Lucian Rosado D.O. 7/21/2023 12:53 PM        Estimated Blood Loss: Minimal    Findings: Mild chronic inflammatory process around the infundibulum with few stones    Complications: None noted    Indication: 28-year-old female 2 weeks postpartum with gallstone pancreatitis has been improving clinically.    OPERATIVE REPORT: The patient was brought to the operating room and placed in  supine position on the operating table. After adequate general anesthesia,   the abdomen was prepped and draped in standard fashion. An area inferior to the umbilucus was infiltrated with 0.25% bupivacaine. An incision was made through the skin and subcutaneous tissues. We then bluntly dissected down to the anterior fascia, which was elevated into the wound using a Kocher clamp. A stay suture of 0 Vicryl was then placed. The fascia was then incised and we dissected through the abdominal wall in layers until the peritoneum was entered. We then placed the Brenda port and insufflated the abdomen. The area in the epigastrium was chosen for a 5 mm port. The skin and subcutaneous tissue were infiltrated with 0.25% bupivacaine. A small incision was made and a 5 mm port was inserted under direct camera observation. Two additional 5 mm ports were placed in the right lateral abdominal wall using identical technique. I then grasped the fundus of the gallbladder and retracted it anteriorly and  superiorly. The infundibulum was retracted anteriorly and laterally. We then skeletonized the cystic duct, doubly clip distally and singly clipped proximally and then divided with the endoscopic shear. The cystic artery was then skeletonized and doubly clipped proximally and singly clipped distally prior to dividing with the endoscopic shear. The gallbladder was then dissected free from its fossa. When this was completed, we placed in an EndoCatch bag and retrieved it from the umbilical port site. We then irrigated the gallbladder fossa in the right upper quadrant until the effluent was clear. There was no sign of bleeding or bile leakage. The ports were then removed. The fascia at the umbilical port site was closed using the stay suture placed at the beginning of the case. The wounds were then irrigated and the skin was closed using a 4-0 Monocryl stitch in a subcuticular fashion. The area was then cleaned and dried and Dermabond was applied. The patient was then awakened from anesthesia and taken to post-anesthesia care unit in stable condition. The sponge, needle, and instrument count was correct at the end of the case and I was present for the entirety of the case.       7/21/2023 1:24 PM Lucian Rosado D.O.

## 2023-07-22 VITALS
BODY MASS INDEX: 31.24 KG/M2 | RESPIRATION RATE: 16 BRPM | WEIGHT: 148.81 LBS | DIASTOLIC BLOOD PRESSURE: 65 MMHG | HEART RATE: 54 BPM | TEMPERATURE: 96.9 F | SYSTOLIC BLOOD PRESSURE: 103 MMHG | HEIGHT: 58 IN | OXYGEN SATURATION: 96 %

## 2023-07-22 LAB
ALBUMIN SERPL BCP-MCNC: 3.8 G/DL (ref 3.2–4.9)
ALBUMIN/GLOB SERPL: 1.2 G/DL
ALP SERPL-CCNC: 203 U/L (ref 30–99)
ALT SERPL-CCNC: 79 U/L (ref 2–50)
ANION GAP SERPL CALC-SCNC: 14 MMOL/L (ref 7–16)
AST SERPL-CCNC: 55 U/L (ref 12–45)
BASOPHILS # BLD AUTO: 0.2 % (ref 0–1.8)
BASOPHILS # BLD: 0.02 K/UL (ref 0–0.12)
BILIRUB SERPL-MCNC: 0.3 MG/DL (ref 0.1–1.5)
BUN SERPL-MCNC: 5 MG/DL (ref 8–22)
CALCIUM ALBUM COR SERPL-MCNC: 9 MG/DL (ref 8.5–10.5)
CALCIUM SERPL-MCNC: 8.8 MG/DL (ref 8.5–10.5)
CHLORIDE SERPL-SCNC: 105 MMOL/L (ref 96–112)
CO2 SERPL-SCNC: 20 MMOL/L (ref 20–33)
CREAT SERPL-MCNC: 0.44 MG/DL (ref 0.5–1.4)
EOSINOPHIL # BLD AUTO: 0 K/UL (ref 0–0.51)
EOSINOPHIL NFR BLD: 0 % (ref 0–6.9)
ERYTHROCYTE [DISTWIDTH] IN BLOOD BY AUTOMATED COUNT: 42.2 FL (ref 35.9–50)
GFR SERPLBLD CREATININE-BSD FMLA CKD-EPI: 136 ML/MIN/1.73 M 2
GLOBULIN SER CALC-MCNC: 3.2 G/DL (ref 1.9–3.5)
GLUCOSE SERPL-MCNC: 101 MG/DL (ref 65–99)
HCT VFR BLD AUTO: 36 % (ref 37–47)
HGB BLD-MCNC: 11.7 G/DL (ref 12–16)
IMM GRANULOCYTES # BLD AUTO: 0.03 K/UL (ref 0–0.11)
IMM GRANULOCYTES NFR BLD AUTO: 0.4 % (ref 0–0.9)
LYMPHOCYTES # BLD AUTO: 1.3 K/UL (ref 1–4.8)
LYMPHOCYTES NFR BLD: 15.6 % (ref 22–41)
MCH RBC QN AUTO: 27.9 PG (ref 27–33)
MCHC RBC AUTO-ENTMCNC: 32.5 G/DL (ref 32.2–35.5)
MCV RBC AUTO: 85.9 FL (ref 81.4–97.8)
MONOCYTES # BLD AUTO: 0.68 K/UL (ref 0–0.85)
MONOCYTES NFR BLD AUTO: 8.2 % (ref 0–13.4)
NEUTROPHILS # BLD AUTO: 6.3 K/UL (ref 1.82–7.42)
NEUTROPHILS NFR BLD: 75.6 % (ref 44–72)
NRBC # BLD AUTO: 0 K/UL
NRBC BLD-RTO: 0 /100 WBC (ref 0–0.2)
PLATELET # BLD AUTO: 337 K/UL (ref 164–446)
PMV BLD AUTO: 11 FL (ref 9–12.9)
POTASSIUM SERPL-SCNC: 4 MMOL/L (ref 3.6–5.5)
PROT SERPL-MCNC: 7 G/DL (ref 6–8.2)
RBC # BLD AUTO: 4.19 M/UL (ref 4.2–5.4)
SODIUM SERPL-SCNC: 139 MMOL/L (ref 135–145)
WBC # BLD AUTO: 8.3 K/UL (ref 4.8–10.8)

## 2023-07-22 PROCEDURE — 85025 COMPLETE CBC W/AUTO DIFF WBC: CPT

## 2023-07-22 PROCEDURE — 80053 COMPREHEN METABOLIC PANEL: CPT

## 2023-07-22 PROCEDURE — 700102 HCHG RX REV CODE 250 W/ 637 OVERRIDE(OP): Performed by: SURGERY

## 2023-07-22 PROCEDURE — G0378 HOSPITAL OBSERVATION PER HR: HCPCS

## 2023-07-22 PROCEDURE — A9270 NON-COVERED ITEM OR SERVICE: HCPCS | Performed by: SURGERY

## 2023-07-22 PROCEDURE — 99024 POSTOP FOLLOW-UP VISIT: CPT | Performed by: NURSE PRACTITIONER

## 2023-07-22 RX ADMIN — ACETAMINOPHEN 1000 MG: 500 TABLET, FILM COATED ORAL at 05:01

## 2023-07-22 NOTE — DISCHARGE INSTRUCTIONS
Laparoscopic Cholecystectomy D/C instructions:    1. DIET: Upon discharge from the hospital you may resume your normal preoperative diet. Depending on how you are feeling and whether you have nausea or not, you may wish to stay with a bland diet for the first few days. However, you can advance this as quickly as you feel ready.    2. ACTIVITIES: After discharge from the hospital, you may resume full routine activities. However, there should be no heavy lifting (greater than 15 pounds) and no strenuous activities until after your follow-up visit. Otherwise, routine activities of daily living are acceptable.    3. DRIVING: You may drive whenever you are off pain medications and are able to perform the activities needed to drive, i.e. turning, bending, twisting, etc.    4. BATHING: You may get the wound wet at any time after leaving the hospital. You may shower, but do not submerge in a bath for at least a week. Dressings may come off after 48 hours.    5. BOWEL FUNCTION: A few patients, after this operation, will develop either frequent or loose stools after meals. This usually corrects itself after a few days, to a few weeks. If this occurs, do not worry; it is not unusual and will resolve. Much more common than loose stools, is constipation. The combination of pain medication and decreased activity level can cause constipation in otherwise normal patients. If you feel this is occurring, take a laxative (Milk of Magnesia, Ex-Lax, Senokot, etc.) until the problem has resolved.    6. PAIN MEDICATION: You will be given a prescription for pain medication at discharge. Please take these as directed. It is important to remember not to take medications on an empty stomach as this may cause nausea.    7.CALL IF YOU HAVE: (1) Fevers to more than 1010 F, (2) Unusual chest or leg pain, (3) Drainage or fluid from incision that may be foul smelling, increased tenderness or soreness at the wound or the wound edges are no longer  together, redness or swelling at the incision site. Please do not hesitate to call with any other questions.     8. APPOINTMENT: Contact our office for a follow-up appointment in 1 to 2 weeks following your procedure.    If you have any additional questions, please do not hesitate to call the office and speak to the physician on call.

## 2023-07-22 NOTE — DISCHARGE SUMMARY
DISCHARGE  SUMMARY    DATE OF ADMISSION: 7/20/2023    DATE OF DISCHARGE: 7/22/2023    DISCHARGE DIAGNOSIS:  Gallstone pancreatitis   Acute cholecystitis     CONSULTATIONS:  None    PROCEDURES:  7/21 Laparoscopic cholecystectomy     BRIEF HPI and HOSPITAL COURSE:  The patient is a healthy 28 year old woman who presented with epigastric pain, nausea, and vomiting.  She had intermittent pain ever since the her seventh month of pregnancy.  Imaging was consistent with gallstone pancreatitis with no choledocholithiasis.  He was taken to the operating theater on 7/21/2023 for a laparoscopic cholecystectomy.  Postoperatively the patient did well.  On the day of discharge she was afebrile nontoxic in appearance.  Her abdomen was soft with well approximated port sites.  She had no peritoneal signs.  She was tolerating an oral diet.  Her AST/ALT were with a downward trend.  Her total bilirubin was 0.3.      DISPOSITION:   Discharged on 7/22/2023. The patient was counseled and questions were answered. Specifically, signs and symptoms of infection, and persistent or worsening abdominal pain were discussed and the patient agrees to seek medical attention if any of these develop.    DISCHARGE MEDICATIONS:  The patients controlled substance history was reviewed and a controlled substance use informed consent (if applicable) was provided by [facility] and the patient has been prescribed.     Medication List        CONTINUE taking these medications        Instructions   Acetaminophen Extra Strength 500 MG Tabs  Generic drug: acetaminophen   Take 2 Tablets by mouth every 6 hours.  Dose: 1,000 mg     PRENATAL 1 PO   Take 1 Tablet by mouth every day.  Dose: 1 Tablet            You will be given a prescription for pain medication at discharge. Please take these as directed. It is important to remember not to take medications on an empty stomach as this may cause nausea.  You may also take over the counter  acetaminophen and/or NSAIDS (ibuprofen, Aleve, Advil, Motrin) per the package instructions.  You may also use ice to the wound to decrease pain and swelling. You may alternate 20 minutes on and 20 minutes off with the ice for the first 24-48 hours. Make sure you place a washcloth or towel between the ice pack and your skin.  Please note that narcotic pain medication cannot be refilled unless you are seen by a doctor. Make sure you call the office if you are running low on medication or if the dose you have been prescribed is not working well for you.    ACTIVITY:  After discharge from the hospital, you may resume full routine activities; however, there should be no heavy lifting (greater than 20 pounds or a bag of groceries) and no strenuous activities for at least 2 weeks. The duration may be longer, depending on your surgical procedure. Routine activities of daily living are acceptable. Activity level should be addressed at your post-op follow up appointment. You may drive whenever you are off pain medications and are able to perform the activities needed to drive, i.e., turning, bending, twisting, etc.      WOUND CARE:  You may shower, but do not submerge in a bath for at least two weeks. If you have wound dressings, they may come off after 48 hours. If you have skin glue to the wound, this will fall off on its own, do not pick at it. If you have steri strips to the wound, these will fall off on their own, do not pick at them, may trim the edges if needed.      DIET:  Upon discharge from the hospital, you may resume your normal preoperative diet, unless specifically directed otherwise. Depending on how you are feeling and whether you have nausea or not, you may wish to stay with a bland diet for the first few days. However, you can advance this as quickly as you feel ready.      FOLLOW UP:  [facility]  [id number]  [place] [id number]  [phone]    Schedule an appointment as soon as possible  for a visit in 1 week(s)  ACS clinic.  Follow up cholecystectomy. As needed, If symptoms worsen    Call the office if you have: (1) Fevers to more than 101F, (2) Unusual chest or leg pain, (3) Drainage or fluid from incision that may be foul smelling, increased tenderness or soreness at the wound or the wound edges are no longer together, redness or swelling at the incision site. Do not hesitate to call with any other questions.    TIME SPENT ON DISCHARGE: 40 minutes      ____________________________________________  JALEN Pyle    DD: 7/22/2023 6:36 AM

## 2023-07-22 NOTE — CARE PLAN
Problem: Pain - Standard  Goal: Alleviation of pain or a reduction in pain to the patient’s comfort goal  Outcome: Progressing     Problem: Knowledge Deficit - Standard  Goal: Patient and family/care givers will demonstrate understanding of plan of care, disease process/condition, diagnostic tests and medications  Outcome: Progressing     Problem: Psychosocial  Goal: Patient's level of anxiety will decrease  Outcome: Progressing     Problem: Discharge Barriers/Planning  Goal: Patient's continuum of care needs are met  Outcome: Progressing     Problem: Mobility  Goal: Patient's capacity to carry out activities will improve  Outcome: Progressing     Problem: Self Care  Goal: Patient will have the ability to perform ADLs independently or with assistance (bathe, groom, dress, toilet and feed)  Outcome: Progressing     Problem: Early Mobilization - Post Surgery  Goal: Early mobilization post surgery  Outcome: Progressing     Problem: Pain - Post Surgery  Goal: Alleviation or reduction of pain post surgery  Outcome: Progressing     Problem: Wound/ / Incision Healing  Goal: Patient's wound/surgical incision will decrease in size and heals properly  Outcome: Progressing   The patient is Stable - Low risk of patient condition declining or worsening    Shift Goals  Clinical Goals: pain mgt  Patient Goals: rest, D/C    Progress made toward(s) clinical / shift goals:  improving    Patient is not progressing towards the following goals:

## 2023-08-07 ENCOUNTER — GYNECOLOGY VISIT (OUTPATIENT)
Dept: OBGYN | Facility: CLINIC | Age: 28
End: 2023-08-07

## 2023-08-07 VITALS
DIASTOLIC BLOOD PRESSURE: 66 MMHG | HEIGHT: 58 IN | BODY MASS INDEX: 30.23 KG/M2 | SYSTOLIC BLOOD PRESSURE: 110 MMHG | WEIGHT: 144 LBS

## 2023-08-07 DIAGNOSIS — T14.8XXA WOUND INFECTION: Primary | ICD-10-CM

## 2023-08-07 DIAGNOSIS — L08.9 WOUND INFECTION: Primary | ICD-10-CM

## 2023-08-07 PROCEDURE — 3078F DIAST BP <80 MM HG: CPT | Performed by: OBSTETRICS & GYNECOLOGY

## 2023-08-07 PROCEDURE — 99213 OFFICE O/P EST LOW 20 MIN: CPT | Performed by: OBSTETRICS & GYNECOLOGY

## 2023-08-07 PROCEDURE — 3074F SYST BP LT 130 MM HG: CPT | Performed by: OBSTETRICS & GYNECOLOGY

## 2023-08-07 RX ORDER — DICLOXACILLIN SODIUM 250 MG/1
250 CAPSULE ORAL 4 TIMES DAILY
Qty: 28 CAPSULE | Refills: 0 | Status: SHIPPED | OUTPATIENT
Start: 2023-08-07 | End: 2023-08-14

## 2023-08-07 ASSESSMENT — FIBROSIS 4 INDEX: FIB4 SCORE: 0.5

## 2023-08-07 NOTE — PROGRESS NOTES
"Subjective     Johanny Hubbard is a 28 y.o. female who presents with Gynecologic Exam (Incision Check)    CC: incisional pain and foul smelling discharge from incision    HPI: Johanny is a 29yo  s/p  on 2023. She had a normal incision check on 2023.  She then had a laparoscopic cholecystectomy on 2023.  During the surgery, the incision was pulled open slightly.  Over the last couple of days, the incision has been more painful and she has noticed a foul-smelling discharge. No fever/chills. No nausea/vomiting.          Objective     /66 (BP Location: Left arm, Patient Position: Sitting, BP Cuff Size: Adult)   Ht 4' 10\"   Wt 144 lb   LMP 10/01/2022 (Exact Date) Comment: 2 weeks postpartum   Breastfeeding Yes   BMI 30.10 kg/m²      Physical Exam  Constitutional:       Appearance: Normal appearance.   HENT:      Head: Normocephalic and atraumatic.   Pulmonary:      Effort: Pulmonary effort is normal. No respiratory distress.   Abdominal:      Comments: Incision is partially open on the left about 1.5 cm wide and about 0.25 cm deep with foul-smelling discharge, minimal erythema. Incision cleaned with saline and probed. Silver nitrate used on the indurated tissue to promote healing.    Neurological:      Mental Status: She is alert.                             Assessment & Plan     Johanny was seen today for incision pain     Diagnoses and all orders for this visit:    Wound infection  - Discussed keeping incision clean and dry. Dicloxacillin prescribed. Patient is breast feeding, so Bactrim was not a good option.   -     dicloxacillin (DYNAPEN) 250 MG Cap; Take 1 Capsule by mouth 4 times a day for 7 days.    Return: In 1 week for wound check.    Galina Garcia M.D.                      "

## 2023-08-07 NOTE — PROGRESS NOTES
Patient here for C Section check.  C Section done on 23  Patient went in to get her gallbladder removed and they said that her  incision was opened from the pulling and something is coming out of the wound  Vaginal bleeding is very light  PP visit 23  Phone number: [phone]  Pharmacy verified

## 2023-08-11 ENCOUNTER — OFFICE VISIT (OUTPATIENT)
Dept: SURGERY | Facility: MEDICAL CENTER | Age: 28
End: 2023-08-11
Attending: SURGERY
Payer: MEDICAID

## 2023-08-11 VITALS
DIASTOLIC BLOOD PRESSURE: 86 MMHG | RESPIRATION RATE: 16 BRPM | HEART RATE: 83 BPM | WEIGHT: 145 LBS | HEIGHT: 58 IN | OXYGEN SATURATION: 95 % | SYSTOLIC BLOOD PRESSURE: 124 MMHG | BODY MASS INDEX: 30.44 KG/M2

## 2023-08-11 DIAGNOSIS — Z90.49 STATUS POST CHOLECYSTECTOMY: ICD-10-CM

## 2023-08-11 PROCEDURE — 99024 POSTOP FOLLOW-UP VISIT: CPT | Performed by: NURSE PRACTITIONER

## 2023-08-11 PROCEDURE — 3079F DIAST BP 80-89 MM HG: CPT | Performed by: NURSE PRACTITIONER

## 2023-08-11 PROCEDURE — 3074F SYST BP LT 130 MM HG: CPT | Performed by: NURSE PRACTITIONER

## 2023-08-11 ASSESSMENT — ENCOUNTER SYMPTOMS
CARDIOVASCULAR NEGATIVE: 1
CONSTITUTIONAL NEGATIVE: 1
GASTROINTESTINAL NEGATIVE: 1

## 2023-08-11 ASSESSMENT — FIBROSIS 4 INDEX: FIB4 SCORE: 0.5

## 2023-08-11 NOTE — PROGRESS NOTES
"Sona Hubbard is a 28 y.o. female who presents for a post cholecystectomy      Denies any dietary concerns. No fever, chills, body aches.   She has noticed some suture material poking out of the port sites. No drainage    HPI    Review of Systems   Constitutional: Negative.    Cardiovascular: Negative.    Gastrointestinal: Negative.    Genitourinary: Negative.    All other systems reviewed and are negative.             Objective     /86   Pulse 83   Resp 16   Ht 1.473 m (4' 10\")   Wt 65.8 kg (145 lb)   LMP 10/01/2022 (Exact Date) Comment: 2 weeks postpartum   SpO2 95%   BMI 30.31 kg/m²      Physical Exam  Vitals and nursing note reviewed.   Cardiovascular:      Rate and Rhythm: Normal rate.   Abdominal:      General: Abdomen is flat.      Palpations: Abdomen is soft.      Comments: Port sites in stages of scaring. No redness or drainage. Able to feel suture material in 3 of the port sites.    Neurological:      Mental Status: She is alert.                             Assessment & Plan        1. Status post cholecystectomy  Area of suture removed with scissors  Discharge from [facility] clinic                    "

## 2023-08-31 ENCOUNTER — POST PARTUM (OUTPATIENT)
Dept: OBGYN | Facility: CLINIC | Age: 28
End: 2023-08-31

## 2023-08-31 VITALS — DIASTOLIC BLOOD PRESSURE: 60 MMHG | SYSTOLIC BLOOD PRESSURE: 110 MMHG | BODY MASS INDEX: 30.51 KG/M2 | WEIGHT: 146 LBS

## 2023-08-31 PROBLEM — O40.3XX0 POLYHYDRAMNIOS IN THIRD TRIMESTER: Status: RESOLVED | Noted: 2020-10-13 | Resolved: 2023-08-31

## 2023-08-31 PROBLEM — O24.419 GDM (GESTATIONAL DIABETES MELLITUS): Status: RESOLVED | Noted: 2023-05-08 | Resolved: 2023-08-31

## 2023-08-31 PROBLEM — O09.93 SUPERVISION OF HIGH-RISK PREGNANCY, THIRD TRIMESTER: Status: RESOLVED | Noted: 2023-03-15 | Resolved: 2023-08-31

## 2023-08-31 PROBLEM — Z98.891 HISTORY OF C-SECTION: Status: RESOLVED | Noted: 2023-03-15 | Resolved: 2023-08-31

## 2023-08-31 PROCEDURE — 0503F POSTPARTUM CARE VISIT: CPT | Performed by: NURSE PRACTITIONER

## 2023-08-31 PROCEDURE — 3078F DIAST BP <80 MM HG: CPT | Performed by: NURSE PRACTITIONER

## 2023-08-31 PROCEDURE — 3074F SYST BP LT 130 MM HG: CPT | Performed by: NURSE PRACTITIONER

## 2023-08-31 ASSESSMENT — ENCOUNTER SYMPTOMS
ABDOMINAL PAIN: 0
NAUSEA: 0
NERVOUS/ANXIOUS: 0
CHILLS: 0
COUGH: 0
WHEEZING: 0
DIARRHEA: 0
CONSTIPATION: 0
WEAKNESS: 0
DEPRESSION: 0
PALPITATIONS: 0
HEADACHES: 0
VOMITING: 0
FEVER: 0
SHORTNESS OF BREATH: 0

## 2023-08-31 ASSESSMENT — EDINBURGH POSTNATAL DEPRESSION SCALE (EPDS)
THINGS HAVE BEEN GETTING ON TOP OF ME: YES, SOMETIMES I HAVEN'T BEEN COPING AS WELL AS USUAL
I HAVE BEEN ANXIOUS OR WORRIED FOR NO GOOD REASON: HARDLY EVER
I HAVE BEEN SO UNHAPPY THAT I HAVE BEEN CRYING: ONLY OCCASIONALLY
TOTAL SCORE: 7
I HAVE BEEN ABLE TO LAUGH AND SEE THE FUNNY SIDE OF THINGS: NOT QUITE SO MUCH NOW
THE THOUGHT OF HARMING MYSELF HAS OCCURRED TO ME: NEVER
I HAVE BEEN SO UNHAPPY THAT I HAVE HAD DIFFICULTY SLEEPING: NOT VERY OFTEN
I HAVE BLAMED MYSELF UNNECESSARILY WHEN THINGS WENT WRONG: NOT VERY OFTEN
I HAVE FELT SAD OR MISERABLE: NO, NOT AT ALL
I HAVE FELT SCARED OR PANICKY FOR NO GOOD REASON: NO, NOT AT ALL
I HAVE LOOKED FORWARD WITH ENJOYMENT TO THINGS: AS MUCH AS I EVER DID

## 2023-08-31 ASSESSMENT — FIBROSIS 4 INDEX: FIB4 SCORE: 0.5
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
## 2023-08-31 NOTE — PROGRESS NOTES
Pt here today for postpartum exam.  Delivery type: C Section 7/2/23  Currently : breast feeding  Self pay: [facility] given to patient.  Desired BCM: ParaGard. Has appt at [facility]  LMP: n/a  Last pap: 3/15/2023  Phone # [phone]  EPDS= 7

## 2025-08-08 ENCOUNTER — HOSPITAL ENCOUNTER (EMERGENCY)
Facility: MEDICAL CENTER | Age: 30
End: 2025-08-08
Attending: STUDENT IN AN ORGANIZED HEALTH CARE EDUCATION/TRAINING PROGRAM

## 2025-08-08 VITALS
HEART RATE: 81 BPM | DIASTOLIC BLOOD PRESSURE: 79 MMHG | OXYGEN SATURATION: 98 % | TEMPERATURE: 96.9 F | BODY MASS INDEX: 27.7 KG/M2 | SYSTOLIC BLOOD PRESSURE: 136 MMHG | HEIGHT: 60 IN | WEIGHT: 141.09 LBS | RESPIRATION RATE: 14 BRPM

## 2025-08-08 DIAGNOSIS — L23.9 ALLERGIC CONTACT DERMATITIS OF SCALP: Primary | ICD-10-CM

## 2025-08-08 PROCEDURE — 700111 HCHG RX REV CODE 636 W/ 250 OVERRIDE (IP): Mod: JZ | Performed by: STUDENT IN AN ORGANIZED HEALTH CARE EDUCATION/TRAINING PROGRAM

## 2025-08-08 PROCEDURE — 99283 EMERGENCY DEPT VISIT LOW MDM: CPT

## 2025-08-08 PROCEDURE — 700102 HCHG RX REV CODE 250 W/ 637 OVERRIDE(OP): Performed by: STUDENT IN AN ORGANIZED HEALTH CARE EDUCATION/TRAINING PROGRAM

## 2025-08-08 PROCEDURE — A9270 NON-COVERED ITEM OR SERVICE: HCPCS | Performed by: STUDENT IN AN ORGANIZED HEALTH CARE EDUCATION/TRAINING PROGRAM

## 2025-08-08 RX ORDER — DIPHENHYDRAMINE HCL 25 MG
25 TABLET ORAL ONCE
Status: COMPLETED | OUTPATIENT
Start: 2025-08-08 | End: 2025-08-08

## 2025-08-08 RX ORDER — FAMOTIDINE 20 MG/1
20 TABLET, FILM COATED ORAL ONCE
Status: COMPLETED | OUTPATIENT
Start: 2025-08-08 | End: 2025-08-08

## 2025-08-08 RX ORDER — DEXAMETHASONE SODIUM PHOSPHATE 10 MG/ML
12 INJECTION, SOLUTION INTRAMUSCULAR; INTRAVENOUS ONCE
Status: COMPLETED | OUTPATIENT
Start: 2025-08-08 | End: 2025-08-08

## 2025-08-08 RX ORDER — DIPHENHYDRAMINE HCL 25 MG
25 CAPSULE ORAL EVERY 6 HOURS PRN
Qty: 30 CAPSULE | Refills: 0 | Status: SHIPPED | OUTPATIENT
Start: 2025-08-08

## 2025-08-08 RX ADMIN — DIPHENHYDRAMINE HYDROCHLORIDE 25 MG: 25 TABLET ORAL at 20:59

## 2025-08-08 RX ADMIN — DEXAMETHASONE SODIUM PHOSPHATE 12 MG: 10 INJECTION, SOLUTION INTRAMUSCULAR; INTRAVENOUS at 20:50

## 2025-08-08 RX ADMIN — FAMOTIDINE 20 MG: 20 TABLET, FILM COATED ORAL at 20:50

## 2025-08-08 ASSESSMENT — PAIN DESCRIPTION - PAIN TYPE: TYPE: ACUTE PAIN

## (undated) DEVICE — PACK ROOM TURNOVER L&D (12/CA)

## (undated) DEVICE — RETRACTOR O C SECTION LRY - (5/BX)

## (undated) DEVICE — SET SUCTION/IRRIGATION WITH DISPOSABLE TIP (6/CA )PART #0250-070-520 IS A SUB

## (undated) DEVICE — HEAD HOLDER JUNIOR/ADULT

## (undated) DEVICE — TROCAR Z THREAD11MM OPTICAL - NON BLADED(6/BX)

## (undated) DEVICE — DRESSING POST OP BORDER 4 X 10 (5EA/BX)

## (undated) DEVICE — GLOVE BIOGEL SZ 6.5 SURGICAL PF LTX (50PR/BX 4BX/CA)

## (undated) DEVICE — CATHETER IV NON-SAFETY 18 GA X 1 1/4 (50/BX 4BX/CA)

## (undated) DEVICE — SET EXTENSION WITH 2 PORTS (48EA/CA) ***PART #2C8610 IS A SUBSTITUTE*****

## (undated) DEVICE — SUTURE 0 VICRYL PLUS UR-6 - 27 INCH (36/BX)

## (undated) DEVICE — GLOVE BIOGEL SZ 6 PF LATEX - (50EA/BX 4BX/CA)

## (undated) DEVICE — CANISTER SUCTION RIGID RED 1500CC (40EA/CA)

## (undated) DEVICE — CHLORAPREP 26 ML APPLICATOR - ORANGE TINT(25/CA)

## (undated) DEVICE — CANISTER SUCTION 3000ML MECHANICAL FILTER AUTO SHUTOFF MEDI-VAC NONSTERILE LF DISP  (40EA/CA)

## (undated) DEVICE — TROCAR 5X100 NON BLADED Z-TH - READ KII (6/BX)

## (undated) DEVICE — COVER LIGHT HANDLE ALC PLUS DISP (18EA/BX)

## (undated) DEVICE — SUCTION INSTRUMENT YANKAUER BULBOUS TIP W/O VENT (50EA/CA)

## (undated) DEVICE — BAG SPONGE COUNT 10.25 X 32 - BLUE (250/CA)

## (undated) DEVICE — ELECTRODE DUAL RETURN W/ CORD - (50/PK)

## (undated) DEVICE — SOLUTION PLASMA-LYTE PH 7.4 INJ 1000ML  (14EA/CA)

## (undated) DEVICE — GLOVE BIOGEL INDICATOR SZ 6.5 SURGICAL PF LTX - (50PR/BX 4BX/CA)

## (undated) DEVICE — SODIUM CHL IRRIGATION 0.9% 1000ML (12EA/CA)

## (undated) DEVICE — SUTURE 0 36IN PDS + VIO CT-1 (36PK/BX)

## (undated) DEVICE — PACK C-SECTION (2EA/CA)

## (undated) DEVICE — GLOVE BIOGEL SZ 8 SURGICAL PF LTX - (50PR/BX 4BX/CA)

## (undated) DEVICE — DERMABOND ADVANCED - (12EA/BX)

## (undated) DEVICE — SLEEVE, SEQUENTIAL CALF REG

## (undated) DEVICE — CANNULA W/SEAL 5X100 Z-THRE - ADED KII (12/BX)

## (undated) DEVICE — BLANKET STERILE CHICKIE FOR L&D (100/CA)

## (undated) DEVICE — GLOVE BIOGEL PI ORTHO SZ 8 PF LF (40PR/BX)

## (undated) DEVICE — SET LEADWIRE 5 LEAD BEDSIDE DISPOSABLE ECG (1SET OF 5/EA)

## (undated) DEVICE — SUTURE GENERAL

## (undated) DEVICE — PLUMEPEN ULTRA 3/8 IN X 10 FT HOSE (20EA/CA)

## (undated) DEVICE — TUBING CLEARLINK DUO-VENT - C-FLO (48EA/CA)

## (undated) DEVICE — CLIP MED LG INTNL HRZN TI ESCP - (20/BX)

## (undated) DEVICE — SLEEVE VASO CALF MED - (10PR/CA)

## (undated) DEVICE — SPONGE SURGICAL PVP  IODINE L8 IN (20EA/CA)

## (undated) DEVICE — TRAY SPINAL ANESTHESIA NON-SAFETY (10/CA)

## (undated) DEVICE — SET TUBING PNEUMOCLEAR HIGH FLOW SMOKE EVACUATION (10EA/BX)

## (undated) DEVICE — WATER IRRIGATION STERILE 1000ML (12EA/CA)

## (undated) DEVICE — TROCAR LAPSCP 100MM 12MM NTHRD - (6/BX)

## (undated) DEVICE — KIT  I.V. START (100EA/CA)

## (undated) DEVICE — GLOVE BIOGEL PI INDICATOR SZ 7.0 SURGICAL PF LF - (50/BX 4BX/CA)

## (undated) DEVICE — PAD LAP STERILE 18 X 18 - (5/PK 40PK/CA)

## (undated) DEVICE — SUTURE 4-0 27IN VCRL PLUS ANTI (36PK/BX)

## (undated) DEVICE — SUTURE 4-0 MONOCRYL PLUS PS-2 - 27 INCH (36/BX)

## (undated) DEVICE — SENSOR OXIMETER ADULT SPO2 RD SET (20EA/BX)

## (undated) DEVICE — LACTATED RINGERS INJ 1000 ML - (14EA/CA 60CA/PF)

## (undated) DEVICE — DRAPESURG STERI-DRAPE LONG - (10/BX 4BX/CA)

## (undated) DEVICE — CANNULA O2 COMFORT SOFT EAR ADULT 7 FT TUBING (50/CA)

## (undated) DEVICE — DETERGENT RENUZYME PLUS 10 OZ PACKET (50/BX)

## (undated) DEVICE — GOWN WARMING STANDARD FLEX - (30/CA)

## (undated) DEVICE — SUTURE 1 VICRYL PLUS CTX - 36 INCH (36/BX)

## (undated) DEVICE — BAG RETRIEVAL 10ML (10EA/BX)

## (undated) DEVICE — PACK LAP CHOLE OR - (2EA/CA)

## (undated) DEVICE — SUTURE 0 VICRYL PLUS CT-1 - 36 INCH (36/BX)